# Patient Record
Sex: FEMALE | Race: BLACK OR AFRICAN AMERICAN | NOT HISPANIC OR LATINO | URBAN - METROPOLITAN AREA
[De-identification: names, ages, dates, MRNs, and addresses within clinical notes are randomized per-mention and may not be internally consistent; named-entity substitution may affect disease eponyms.]

---

## 2017-07-10 ENCOUNTER — EMERGENCY (EMERGENCY)
Facility: HOSPITAL | Age: 37
LOS: 0 days | Discharge: HOME | End: 2017-07-10
Admitting: INTERNAL MEDICINE

## 2017-07-10 DIAGNOSIS — M25.541 PAIN IN JOINTS OF RIGHT HAND: ICD-10-CM

## 2017-07-10 DIAGNOSIS — R42 DIZZINESS AND GIDDINESS: ICD-10-CM

## 2017-07-10 DIAGNOSIS — M25.562 PAIN IN LEFT KNEE: ICD-10-CM

## 2017-07-10 DIAGNOSIS — R06.02 SHORTNESS OF BREATH: ICD-10-CM

## 2017-07-10 DIAGNOSIS — M32.9 SYSTEMIC LUPUS ERYTHEMATOSUS, UNSPECIFIED: ICD-10-CM

## 2017-07-10 DIAGNOSIS — Z98.890 OTHER SPECIFIED POSTPROCEDURAL STATES: ICD-10-CM

## 2017-07-10 DIAGNOSIS — M06.9 RHEUMATOID ARTHRITIS, UNSPECIFIED: ICD-10-CM

## 2017-07-10 DIAGNOSIS — R51 HEADACHE: ICD-10-CM

## 2017-07-10 DIAGNOSIS — M25.542 PAIN IN JOINTS OF LEFT HAND: ICD-10-CM

## 2017-07-10 DIAGNOSIS — R07.9 CHEST PAIN, UNSPECIFIED: ICD-10-CM

## 2017-07-10 DIAGNOSIS — Z79.899 OTHER LONG TERM (CURRENT) DRUG THERAPY: ICD-10-CM

## 2017-07-10 DIAGNOSIS — Z79.01 LONG TERM (CURRENT) USE OF ANTICOAGULANTS: ICD-10-CM

## 2017-07-10 DIAGNOSIS — E83.42 HYPOMAGNESEMIA: ICD-10-CM

## 2017-07-10 DIAGNOSIS — R07.89 OTHER CHEST PAIN: ICD-10-CM

## 2017-07-10 DIAGNOSIS — M25.561 PAIN IN RIGHT KNEE: ICD-10-CM

## 2017-07-10 DIAGNOSIS — I26.99 OTHER PULMONARY EMBOLISM WITHOUT ACUTE COR PULMONALE: ICD-10-CM

## 2017-07-10 DIAGNOSIS — Z86.711 PERSONAL HISTORY OF PULMONARY EMBOLISM: ICD-10-CM

## 2017-07-10 DIAGNOSIS — M25.569 PAIN IN UNSPECIFIED KNEE: ICD-10-CM

## 2018-02-12 ENCOUNTER — EMERGENCY (EMERGENCY)
Facility: HOSPITAL | Age: 38
LOS: 0 days | Discharge: HOME | End: 2018-02-12
Attending: EMERGENCY MEDICINE

## 2018-02-12 VITALS
TEMPERATURE: 99 F | DIASTOLIC BLOOD PRESSURE: 57 MMHG | HEART RATE: 79 BPM | SYSTOLIC BLOOD PRESSURE: 116 MMHG | OXYGEN SATURATION: 100 % | RESPIRATION RATE: 18 BRPM

## 2018-02-12 DIAGNOSIS — F17.200 NICOTINE DEPENDENCE, UNSPECIFIED, UNCOMPLICATED: ICD-10-CM

## 2018-02-12 DIAGNOSIS — Z79.01 LONG TERM (CURRENT) USE OF ANTICOAGULANTS: ICD-10-CM

## 2018-02-12 DIAGNOSIS — L73.2 HIDRADENITIS SUPPURATIVA: ICD-10-CM

## 2018-02-12 DIAGNOSIS — Z98.890 OTHER SPECIFIED POSTPROCEDURAL STATES: Chronic | ICD-10-CM

## 2018-02-12 DIAGNOSIS — M32.9 SYSTEMIC LUPUS ERYTHEMATOSUS, UNSPECIFIED: ICD-10-CM

## 2018-02-12 DIAGNOSIS — M79.662 PAIN IN LEFT LOWER LEG: ICD-10-CM

## 2018-02-12 DIAGNOSIS — Z98.890 OTHER SPECIFIED POSTPROCEDURAL STATES: ICD-10-CM

## 2018-02-12 DIAGNOSIS — M25.552 PAIN IN LEFT HIP: ICD-10-CM

## 2018-02-12 RX ORDER — WARFARIN SODIUM 2.5 MG/1
7.5 TABLET ORAL ONCE
Qty: 0 | Refills: 0 | Status: COMPLETED | OUTPATIENT
Start: 2018-02-12 | End: 2018-02-12

## 2018-02-12 RX ORDER — HYDROXYCHLOROQUINE SULFATE 200 MG
200 TABLET ORAL ONCE
Qty: 0 | Refills: 0 | Status: COMPLETED | OUTPATIENT
Start: 2018-02-12 | End: 2018-02-12

## 2018-02-12 RX ORDER — OXYCODONE AND ACETAMINOPHEN 5; 325 MG/1; MG/1
2 TABLET ORAL ONCE
Qty: 0 | Refills: 0 | Status: DISCONTINUED | OUTPATIENT
Start: 2018-02-12 | End: 2018-02-12

## 2018-02-12 RX ORDER — OXYCODONE AND ACETAMINOPHEN 5; 325 MG/1; MG/1
1 TABLET ORAL ONCE
Qty: 0 | Refills: 0 | Status: DISCONTINUED | OUTPATIENT
Start: 2018-02-12 | End: 2018-02-12

## 2018-02-12 RX ADMIN — OXYCODONE AND ACETAMINOPHEN 2 TABLET(S): 5; 325 TABLET ORAL at 19:50

## 2018-02-12 RX ADMIN — WARFARIN SODIUM 7.5 MILLIGRAM(S): 2.5 TABLET ORAL at 22:22

## 2018-02-12 RX ADMIN — OXYCODONE AND ACETAMINOPHEN 1 TABLET(S): 5; 325 TABLET ORAL at 22:22

## 2018-02-12 RX ADMIN — Medication 200 MILLIGRAM(S): at 22:22

## 2018-02-12 RX ADMIN — OXYCODONE AND ACETAMINOPHEN 2 TABLET(S): 5; 325 TABLET ORAL at 21:57

## 2018-02-12 RX ADMIN — Medication 5 MILLIGRAM(S): at 22:22

## 2018-02-12 NOTE — ED PROVIDER NOTE - MEDICAL DECISION MAKING DETAILS
Patient with SLE here after missing meds x 1 week -- reports rx are at pharmacy but was worried about calf pain so came to ED. Will get LE duplex, give patient a dose of her normal analgesia. If unreamrkable will restart on coumadin, patient has very close PMD follow up.

## 2018-02-12 NOTE — ED ADULT NURSE NOTE - OTHER COMPLAINTS
Pt sts she has been off her warfarin and lupus meds x 1 week; c/o "aches to entire lower body, numb toes on my left foot and I have an abcess under my right arm

## 2018-02-12 NOTE — ED PROVIDER NOTE - OBJECTIVE STATEMENT
Immediate Care if no appts available. Cannot prescribe for rash/lesions without evaluation. 36 yo F with history of SLE, PE missed all meds x 7 days due to not picking them up from the pharmacy here for L hip pain typical of chronic pain but also L calf pain without swelling or redness. Sx worse with ambulation. No CP or SOB.     No recent travel or immobilization.

## 2018-02-12 NOTE — ED PROVIDER NOTE - PROGRESS NOTE DETAILS
prelim US negative, will restart on regular coumadin dose, give dose in ED, also give dose of hydrochloroquine and prednisone. DC with PMD follow up, patient will  meds from pharmacy in AM

## 2018-02-12 NOTE — ED PROVIDER NOTE - PHYSICAL EXAMINATION
VITAL SIGNS: I have reviewed nursing notes and confirm.  CONSTITUTIONAL: Well-developed; well-nourished; in no acute distress.  SKIN: Skin exam is warm and dry, malar rash to face  HEAD: Normocephalic; atraumatic.  EYES: PERRL, EOM intact; conjunctiva and sclera clear.  ENT: No nasal discharge; airway clear.  NECK: Supple; non tender.  CARD: RRR, no murmur  RESP: clear lungs, no wheezing  ABD: soft, NT, ND, normal BS  EXT: no swelling, + calf tenderness on L, no asymmetry, no palpably cord, NV intact  NEURO: Alert, oriented. Grossly unremarkable. No focal deficits.  PSYCH: Cooperative, appropriate.

## 2018-02-12 NOTE — ED PROVIDER NOTE - CARE PLAN
Principal Discharge DX:	Medication discontinued without order  Secondary Diagnosis:	Lupus (systemic lupus erythematosus)  Secondary Diagnosis:	Hydradenitis

## 2018-02-12 NOTE — ED PROVIDER NOTE - NS ED ROS FT
Constitutional: no fever, chills, no recent weight loss, change in appetite or malaise  Cardiac: No chest pain, SOB or edema.  Respiratory: No cough or respiratory distress  GI: No nausea, vomiting, diarrhea or abdominal pain.  : No dysuria, frequency, urgency or hematuria  MS: see HPI  Neuro: No headache or weakness. No LOC.  Skin: has chronic malar rash  Except as documented in the HPI, all other systems are negative.

## 2018-02-22 PROBLEM — Z00.00 ENCOUNTER FOR PREVENTIVE HEALTH EXAMINATION: Status: ACTIVE | Noted: 2018-02-22

## 2018-09-06 NOTE — ED ADULT NURSE NOTE - PAIN: BODY LOCATION
10:15am-11:30am  Group note:  Patient is engaged in the group process but is not always attentive to others.  She has much to say.  Patient talked about her many losses, wanting her family to understand what she is going through.  Sometimes she strays off topic but is making progress.   Right:/Left:/Bilateral:/hip

## 2018-11-27 ENCOUNTER — INPATIENT (INPATIENT)
Facility: HOSPITAL | Age: 38
LOS: 2 days | Discharge: HOME HEALTH | End: 2018-11-30
Attending: INTERNAL MEDICINE | Admitting: INTERNAL MEDICINE

## 2018-11-27 VITALS
RESPIRATION RATE: 18 BRPM | TEMPERATURE: 98 F | OXYGEN SATURATION: 100 % | HEART RATE: 80 BPM | DIASTOLIC BLOOD PRESSURE: 88 MMHG | SYSTOLIC BLOOD PRESSURE: 145 MMHG

## 2018-11-27 DIAGNOSIS — Z98.890 OTHER SPECIFIED POSTPROCEDURAL STATES: Chronic | ICD-10-CM

## 2018-11-27 PROBLEM — I26.99 OTHER PULMONARY EMBOLISM WITHOUT ACUTE COR PULMONALE: Chronic | Status: ACTIVE | Noted: 2018-02-12

## 2018-11-27 PROBLEM — M32.9 SYSTEMIC LUPUS ERYTHEMATOSUS, UNSPECIFIED: Chronic | Status: ACTIVE | Noted: 2018-02-12

## 2018-11-27 LAB
ALBUMIN SERPL ELPH-MCNC: 3.5 G/DL — SIGNIFICANT CHANGE UP (ref 3.5–5.2)
ALP SERPL-CCNC: 51 U/L — SIGNIFICANT CHANGE UP (ref 30–115)
ALT FLD-CCNC: 10 U/L — SIGNIFICANT CHANGE UP (ref 0–41)
ANION GAP SERPL CALC-SCNC: 14 MMOL/L — SIGNIFICANT CHANGE UP (ref 7–14)
AST SERPL-CCNC: 22 U/L — SIGNIFICANT CHANGE UP (ref 0–41)
BASE EXCESS BLDV CALC-SCNC: -2.9 MMOL/L — LOW (ref -2–2)
BASOPHILS # BLD AUTO: 0.04 K/UL — SIGNIFICANT CHANGE UP (ref 0–0.2)
BASOPHILS NFR BLD AUTO: 0.8 % — SIGNIFICANT CHANGE UP (ref 0–1)
BILIRUB SERPL-MCNC: 0.3 MG/DL — SIGNIFICANT CHANGE UP (ref 0.2–1.2)
BLD GP AB SCN SERPL QL: SIGNIFICANT CHANGE UP
BUN SERPL-MCNC: 6 MG/DL — LOW (ref 10–20)
CA-I SERPL-SCNC: 1.12 MMOL/L — SIGNIFICANT CHANGE UP (ref 1.12–1.3)
CALCIUM SERPL-MCNC: 8.7 MG/DL — SIGNIFICANT CHANGE UP (ref 8.5–10.1)
CHLORIDE SERPL-SCNC: 105 MMOL/L — SIGNIFICANT CHANGE UP (ref 98–110)
CO2 SERPL-SCNC: 19 MMOL/L — SIGNIFICANT CHANGE UP (ref 17–32)
CREAT SERPL-MCNC: 0.8 MG/DL — SIGNIFICANT CHANGE UP (ref 0.7–1.5)
CRP SERPL-MCNC: 0.51 MG/DL — HIGH (ref 0–0.4)
D DIMER BLD IA.RAPID-MCNC: 290 NG/ML DDU — HIGH (ref 0–230)
EOSINOPHIL # BLD AUTO: 0.07 K/UL — SIGNIFICANT CHANGE UP (ref 0–0.7)
EOSINOPHIL NFR BLD AUTO: 1.3 % — SIGNIFICANT CHANGE UP (ref 0–8)
ERYTHROCYTE [SEDIMENTATION RATE] IN BLOOD: 11 MM/HR — SIGNIFICANT CHANGE UP (ref 0–15)
GAS PNL BLDV: 138 MMOL/L — SIGNIFICANT CHANGE UP (ref 136–145)
GAS PNL BLDV: SIGNIFICANT CHANGE UP
GLUCOSE SERPL-MCNC: 84 MG/DL — SIGNIFICANT CHANGE UP (ref 70–99)
HCO3 BLDV-SCNC: 23 MMOL/L — SIGNIFICANT CHANGE UP (ref 22–29)
HCT VFR BLD CALC: 36.4 % — LOW (ref 37–47)
HCT VFR BLDA CALC: 36.7 % — SIGNIFICANT CHANGE UP (ref 34–44)
HGB BLD CALC-MCNC: 12 G/DL — LOW (ref 14–18)
HGB BLD-MCNC: 11.8 G/DL — LOW (ref 12–16)
IMM GRANULOCYTES NFR BLD AUTO: 0.4 % — HIGH (ref 0.1–0.3)
INR BLD: 1.15 RATIO — SIGNIFICANT CHANGE UP (ref 0.65–1.3)
LACTATE BLDV-MCNC: 1.1 MMOL/L — SIGNIFICANT CHANGE UP (ref 0.5–1.6)
LIDOCAIN IGE QN: 24 U/L — SIGNIFICANT CHANGE UP (ref 7–60)
LYMPHOCYTES # BLD AUTO: 0.99 K/UL — LOW (ref 1.2–3.4)
LYMPHOCYTES # BLD AUTO: 18.9 % — LOW (ref 20.5–51.1)
MAGNESIUM SERPL-MCNC: 1.9 MG/DL — SIGNIFICANT CHANGE UP (ref 1.8–2.4)
MCHC RBC-ENTMCNC: 29.6 PG — SIGNIFICANT CHANGE UP (ref 27–31)
MCHC RBC-ENTMCNC: 32.4 G/DL — SIGNIFICANT CHANGE UP (ref 32–37)
MCV RBC AUTO: 91.5 FL — SIGNIFICANT CHANGE UP (ref 81–99)
MONOCYTES # BLD AUTO: 0.89 K/UL — HIGH (ref 0.1–0.6)
MONOCYTES NFR BLD AUTO: 17 % — HIGH (ref 1.7–9.3)
NEUTROPHILS # BLD AUTO: 3.22 K/UL — SIGNIFICANT CHANGE UP (ref 1.4–6.5)
NEUTROPHILS NFR BLD AUTO: 61.6 % — SIGNIFICANT CHANGE UP (ref 42.2–75.2)
NRBC # BLD: 0 /100 WBCS — SIGNIFICANT CHANGE UP (ref 0–0)
PCO2 BLDV: 43 MMHG — SIGNIFICANT CHANGE UP (ref 41–51)
PH BLDV: 7.34 — SIGNIFICANT CHANGE UP (ref 7.26–7.43)
PLATELET # BLD AUTO: 252 K/UL — SIGNIFICANT CHANGE UP (ref 130–400)
PO2 BLDV: 43 MMHG — HIGH (ref 20–40)
POTASSIUM BLDV-SCNC: 3.6 MMOL/L — SIGNIFICANT CHANGE UP (ref 3.3–5.6)
POTASSIUM SERPL-MCNC: 4.5 MMOL/L — SIGNIFICANT CHANGE UP (ref 3.5–5)
POTASSIUM SERPL-SCNC: 4.5 MMOL/L — SIGNIFICANT CHANGE UP (ref 3.5–5)
PROT SERPL-MCNC: 6.3 G/DL — SIGNIFICANT CHANGE UP (ref 6–8)
PROTHROM AB SERPL-ACNC: 13.2 SEC — HIGH (ref 9.95–12.87)
RBC # BLD: 3.98 M/UL — LOW (ref 4.2–5.4)
RBC # FLD: 13.4 % — SIGNIFICANT CHANGE UP (ref 11.5–14.5)
SAO2 % BLDV: 75 % — SIGNIFICANT CHANGE UP
SODIUM SERPL-SCNC: 138 MMOL/L — SIGNIFICANT CHANGE UP (ref 135–146)
TROPONIN T SERPL-MCNC: <0.01 NG/ML — SIGNIFICANT CHANGE UP
TYPE + AB SCN PNL BLD: SIGNIFICANT CHANGE UP
WBC # BLD: 5.23 K/UL — SIGNIFICANT CHANGE UP (ref 4.8–10.8)
WBC # FLD AUTO: 5.23 K/UL — SIGNIFICANT CHANGE UP (ref 4.8–10.8)

## 2018-11-27 RX ORDER — ZOLPIDEM TARTRATE 10 MG/1
5 TABLET ORAL AT BEDTIME
Qty: 0 | Refills: 0 | Status: DISCONTINUED | OUTPATIENT
Start: 2018-11-27 | End: 2018-11-30

## 2018-11-27 RX ORDER — SENNA PLUS 8.6 MG/1
2 TABLET ORAL AT BEDTIME
Qty: 0 | Refills: 0 | Status: DISCONTINUED | OUTPATIENT
Start: 2018-11-27 | End: 2018-11-30

## 2018-11-27 RX ORDER — ALPRAZOLAM 0.25 MG
0.5 TABLET ORAL ONCE
Qty: 0 | Refills: 0 | Status: DISCONTINUED | OUTPATIENT
Start: 2018-11-27 | End: 2018-11-27

## 2018-11-27 RX ORDER — OXYCODONE HYDROCHLORIDE 5 MG/1
10 TABLET ORAL ONCE
Qty: 0 | Refills: 0 | Status: DISCONTINUED | OUTPATIENT
Start: 2018-11-27 | End: 2018-11-27

## 2018-11-27 RX ORDER — ALPRAZOLAM 0.25 MG
0.5 TABLET ORAL AT BEDTIME
Qty: 0 | Refills: 0 | Status: DISCONTINUED | OUTPATIENT
Start: 2018-11-27 | End: 2018-11-28

## 2018-11-27 RX ORDER — OXYCODONE AND ACETAMINOPHEN 5; 325 MG/1; MG/1
1 TABLET ORAL EVERY 6 HOURS
Qty: 0 | Refills: 0 | Status: DISCONTINUED | OUTPATIENT
Start: 2018-11-27 | End: 2018-11-28

## 2018-11-27 RX ORDER — SODIUM CHLORIDE 9 MG/ML
1000 INJECTION INTRAMUSCULAR; INTRAVENOUS; SUBCUTANEOUS ONCE
Qty: 0 | Refills: 0 | Status: COMPLETED | OUTPATIENT
Start: 2018-11-27 | End: 2018-11-27

## 2018-11-27 RX ORDER — HYDROXYCHLOROQUINE SULFATE 200 MG
1 TABLET ORAL
Qty: 0 | Refills: 0 | COMMUNITY

## 2018-11-27 RX ORDER — ZOLPIDEM TARTRATE 10 MG/1
5 TABLET ORAL ONCE
Qty: 0 | Refills: 0 | Status: DISCONTINUED | OUTPATIENT
Start: 2018-11-27 | End: 2018-11-28

## 2018-11-27 RX ORDER — DIPHENHYDRAMINE HCL 50 MG
25 CAPSULE ORAL ONCE
Qty: 0 | Refills: 0 | Status: COMPLETED | OUTPATIENT
Start: 2018-11-27 | End: 2018-11-27

## 2018-11-27 RX ORDER — ONDANSETRON 8 MG/1
4 TABLET, FILM COATED ORAL EVERY 6 HOURS
Qty: 0 | Refills: 0 | Status: DISCONTINUED | OUTPATIENT
Start: 2018-11-27 | End: 2018-11-28

## 2018-11-27 RX ORDER — DOCUSATE SODIUM 100 MG
100 CAPSULE ORAL THREE TIMES A DAY
Qty: 0 | Refills: 0 | Status: DISCONTINUED | OUTPATIENT
Start: 2018-11-27 | End: 2018-11-30

## 2018-11-27 RX ORDER — WARFARIN SODIUM 2.5 MG/1
7.5 TABLET ORAL ONCE
Qty: 0 | Refills: 0 | Status: COMPLETED | OUTPATIENT
Start: 2018-11-27 | End: 2018-11-27

## 2018-11-27 RX ORDER — HYDROXYCHLOROQUINE SULFATE 200 MG
200 TABLET ORAL DAILY
Qty: 0 | Refills: 0 | Status: DISCONTINUED | OUTPATIENT
Start: 2018-11-27 | End: 2018-11-30

## 2018-11-27 RX ADMIN — Medication 20 MILLIGRAM(S): at 23:39

## 2018-11-27 RX ADMIN — Medication 125 MILLIGRAM(S): at 14:30

## 2018-11-27 RX ADMIN — SODIUM CHLORIDE 1000 MILLILITER(S): 9 INJECTION INTRAMUSCULAR; INTRAVENOUS; SUBCUTANEOUS at 18:07

## 2018-11-27 RX ADMIN — OXYCODONE HYDROCHLORIDE 10 MILLIGRAM(S): 5 TABLET ORAL at 18:06

## 2018-11-27 RX ADMIN — OXYCODONE HYDROCHLORIDE 10 MILLIGRAM(S): 5 TABLET ORAL at 14:31

## 2018-11-27 RX ADMIN — OXYCODONE HYDROCHLORIDE 10 MILLIGRAM(S): 5 TABLET ORAL at 19:22

## 2018-11-27 RX ADMIN — Medication 0.5 MILLIGRAM(S): at 17:05

## 2018-11-27 RX ADMIN — SODIUM CHLORIDE 2000 MILLILITER(S): 9 INJECTION INTRAMUSCULAR; INTRAVENOUS; SUBCUTANEOUS at 14:31

## 2018-11-27 RX ADMIN — Medication 25 MILLIGRAM(S): at 19:31

## 2018-11-27 RX ADMIN — OXYCODONE HYDROCHLORIDE 10 MILLIGRAM(S): 5 TABLET ORAL at 17:52

## 2018-11-27 RX ADMIN — OXYCODONE HYDROCHLORIDE 10 MILLIGRAM(S): 5 TABLET ORAL at 23:16

## 2018-11-27 NOTE — H&P ADULT - NSHPREVIEWOFSYSTEMS_GEN_ALL_CORE
Eyes:  No visual changes, eye pain or discharge.  ENMT:  No hearing changes, pain, no sore throat or runny nose, no difficulty swallowing  Cardiac:  as mentioned in HPI.  Respiratory: uri symptoms now resolved  GI:  as mentioned in HPI.  :  No dysuria, frequency or burning.  MS:  as mentioned in HPI.  Neuro:  No headache or weakness.  No LOC.  Skin:  No skin rash.   Endocrine: No history of thyroid disease or diabetes.

## 2018-11-27 NOTE — H&P ADULT - NSHPSOCIALHISTORY_GEN_ALL_CORE
Current everyday smoker ; alcohol socially , denies drug use.  lives with  at home and needs help with ADLs.

## 2018-11-27 NOTE — ED ADULT TRIAGE NOTE - CHIEF COMPLAINT QUOTE
Pt recently diagnosed with lupus and now complaining of dizziness, heartache nausea and emesis. pt on warfarin for a PE.

## 2018-11-27 NOTE — H&P ADULT - ASSESSMENT
38 y o f with pmh of SLE , RA on prednisone , hydroxychloroquine , PE on coumadin , gastritis , fibromyalgia and insomnia presented to ER with c/o 1 wk h/o weakness , myalgias and 1 day h/o chest pain and left shoulder pain .     # CHILLS / MYALGIAS / LIKELY VIRAL ETIOLOGY DOUBT FLU AS SYMPTOMS HAVE RESOLVED / RA NOT TAKING PREDNISONE     - will admit to medicine for optimization of medical care.  - will start pt on prednisone 20 mg once a day for 7 days, pt will need a slow  steroid taper .  - Pt reports that her rheumatologist manages her RA and SLE , but recently she is not able to follow up with her sec to insurance issues.  -outpt rheumatology f/u.  -CTA negative for PE ; will continue with coumadin.  - supportive care.  - will continue with plaquanil .  -PT /OT eval.    # SLE FLARE:    - will c/w plaquanil and steroids .    #  H/O PE :    - will continue with coumadin , inr is subtherapeutic ; repeat INR in am.    # DVT / GI :    -ON COUMADIN , WILL START ON PROTONIX.    # DISPO:    -full code from home.

## 2018-11-27 NOTE — ED PROVIDER NOTE - NS ED ROS FT
Constitutional: + fevers/chills, + sick contacts  Eyes: + visual changes, eye pain or discharge. No photophobia  ENMT: No hearing changes, pain, discharge or infections. No sore throat or drooling.  Neck:  No neck pain or stiffness. No limited ROM  Cardiac: + SOB or edema. + chest pain radiating to her back  Respiratory: + cough and mild respiratory distress. No hemoptysis. No history of asthma or RAD  GI: + nausea/vomiting and abd pain  : No dysuria, frequency or burning. No discharge  MS: No myalgia, muscle weakness, joint pain or back pain  Neuro: No headache or weakness. No LOC  Skin: No skin rash  Endo: no diabetes or thyroid dysfunction  Heme: +hx DVT/PE  Except as documented in the HPI, all other systems are negative

## 2018-11-27 NOTE — ED PROVIDER NOTE - CARE PLAN
Principal Discharge DX:	SLE exacerbation  Secondary Diagnosis:	Chest pain, unspecified type  Secondary Diagnosis:	Non-intractable vomiting with nausea, unspecified vomiting type  Secondary Diagnosis:	History of medication noncompliance

## 2018-11-27 NOTE — ED PROVIDER NOTE - SECONDARY DIAGNOSIS.
Chest pain, unspecified type Non-intractable vomiting with nausea, unspecified vomiting type History of medication noncompliance

## 2018-11-27 NOTE — H&P ADULT - HISTORY OF PRESENT ILLNESS
38 y o f with pmh of SLE , RA on prednisone , hydroxychloroquine , PE on coumadin , gastritis , fibromyalgia and insomnia presented to ER with c/o 1 wk h/o weakness , myalgias and 1 day h/o chest pain and left shoulder pain . Pt reports that she was on prednisone for her RA , her doctor tapered her off steroids one month ago and since then she has been having pains. One wk ago pt started feeling chills,  had myalgias , runny nose , body aches and was vomiting. Pt reports that she took advil and it helped her with symptoms. Pt denies any sick contacts . Pt reports now that her symptoms of URI have resolved but chest pain that she had last night made her come to ER . Pt describes the pain as 10/10 , sharp , on left side and associated with SOB .Pt went to her PMD  2 days ago , and was told her INR was subtherapeutic so she should increase dose of coumadin. Pt reports that now she is taking 7.5 mg of coumadin.    In ER pt was afebrile , vitally stable, CTA was done and did not show any PE . But pt still reports weakness , so was admitted for optimization of comorbid conditions.

## 2018-11-27 NOTE — H&P ADULT - ATTENDING COMMENTS
pt seen and examined independently, I have read and agree with above exam and poa    continue dmads/steroids    check cx    rehab/pt/p    lmwh/coumadin    dc planning

## 2018-11-27 NOTE — H&P ADULT - FAMILY HISTORY
Mother  Still living? Yes, Estimated age: Age Unknown  Family history of CHF (congestive heart failure), Age at diagnosis: Age Unknown

## 2018-11-27 NOTE — ED ADULT NURSE NOTE - GENITOURINARY ASSESSMENT
Medicare Wellness Visit  Plan for Preventive Care    A good way for you to stay healthy is to use preventive care.  Medicare covers many services that can help you stay healthy.* The goal of these services is to find any health problems as quickly as possible. Finding problems early can help make them easier to treat.  Your personal plan below lists the services you may need and when they are due.     Health Maintenance Summary     Topic Due On Due Status Completed On    Immunization - Pneumococcal  Completed Oct 2, 2015    Medicare Wellness Visit Jul 21, 2017 Overdue Jul 21, 2016    IMMUNIZATION - DTaP/Tdap/Td [birth year], 1949 Overdue     Immunization-Influenza  Completed Sep 22, 2017    Depression Screening Mar 23, 2019 Not Due Mar 23, 2018           Preventive Care for Women and Men    Heart Screenings (Cardiovascular):  · Blood tests are used to check your cholesterol, lipid and triglyceride levels. High levels can increase your risk for heart disease and stroke. High levels can be treated with medications, diet and exercise. Lowering your levels can help keep your heart and blood vessels healthy.  Your provider will order these tests if they are needed.    · An ultrasound is done to see if you have an abdominal aortic aneurysm (AAA).  This is an enlargement of one of the main blood vessels that delivers blood to the body.   In the [place], 9,000 deaths are caused by AAA.  You may not even know you have this problem and as many as 1 in 3 people will have a serious problem if it is not treated.  Early diagnosis allows for more effective treatment and cure.  If you have a family history of AAA or are a male age 65-75 who has smoked, you are at higher risk of an AAA.  Your provider can order this test, if needed.    Colorectal Screening:  · There are many tests that are used to check for cancer of your colon and rectum. You and your provider should discuss what test is best for you and when to have it done.   Options include:  · Screening Colonoscopy: exam of the entire colon, seen through a flexible lighted tube.  · Flexible Sigmoidoscopy: exam of the last third (sigmoid portion) of the colon and rectum, seen through a flexible lighted tube.  · Cologuard DNA stool test: a sample of your stool is used to screen for cancer and unseen blood in your stool.  · Fecal Occult Blood Test: a sample of your stool is studied to find any unseen blood    Flu Shot:  · An immunization that helps to prevent influenza (the flu). You should get this every year. The best time to get the shot is in the fall.    Pneumococcal Shot:  • Vaccines are available that can help prevent pneumococcal disease, which is any type of infection caused by Streptococcus pneumoniae bacteria.   Their use can prevent some cases of pneumonia, meningitis, and sepsis. There are two types of pneumococcal vaccines:   o Conjugate vaccines (PCV-13 or Prevnar 13®) - helps protect against the 13 types of pneumococcal bacteria that are the most common causes of serious infections in children and adults.    o Polysaccharide vaccine (PPSV23 or Lhkkijtob34®) - helps protect against 23 types of pneumococcal bacteria for patients who are recommended to get it.  These vaccines should be given at least 12 months apart.  A booster is usually not needed.     Hepatitis B Shot:  · An immunization that helps to protect people from getting Hepatitis B. Hepatitis B is a virus that spreads through contact with infected blood or body fluids. Many people with the virus do not have symptoms.  The virus can lead to serious problems, such as liver disease. Some people are at higher risk than others. Your doctor will tell you if you need this shot.     Diabetes Screening:  · A test to measure sugar (glucose) in your blood is called a fasting blood sugar. Fasting means you cannot have food or drink for at least 8 hours before the test. This test can detect diabetes long before you may notice  symptoms.    Glaucoma Screening:  · Glaucoma screening is performed by your eye doctor. The test measures the fluid pressure inside your eyes to determine if you have glaucoma.     Hepatitis C Screening:  · A blood test to see if you have the hepatitis C virus.  Hepatitis C attacks the liver and is a major cause of chronic liver disease.  Medicare will cover a single screening for all adults born between 1945 & 1965, or high risk patients (people who have injected illegal drugs or people who have had blood transfusions).  High risk patients who continue to inject illegal drugs can be screened for Hepatitis C every year.    Smoking and Tobacco-Use Cessation Counseling:  · Tobacco is the single greatest cause of disease and early death in our country today. Medication and counseling together can increase a person’s chance of quitting for good.   · Medicare covers two quitting attempts per year, with four counseling sessions per attempt (eight sessions in a 12 month period)    Preventive Screening tests for Women    Screening Mammograms and Breast Exams:  · An x-ray of your breasts to check for breast cancer before you or your doctor may be able to feel it.  If breast cancer is found early it can usually be treated with success.    Pelvic Exams and Pap Tests:  · An exam to check for cervical and vaginal cancer. A Pap test is a lab test in which cells are taken from your cervix and sent to the lab to look for signs of cervical cancer. If cancer of the cervix is found early, chances for a cure are good. Testing can generally end at age 65, or if a woman has a hysterectomy for a benign condition. Your provider may recommend more frequent testing if certain abnormal results are found.    Bone Mass Measurements:  · A painless x-ray of your bone density to see if you are at risk for a broken bone. Bone density refers to the thickness of bones or how tightly the bone tissue is packed.    Preventive Screening tests for  Men    Prostate Screening:  · PSA - Prostate Cancer blood test.  Experts do not recommend routine screening of healthy men with no signs or symptoms of prostate disease.  However, men should not ignore urinary symptoms, and should discuss their family history with their doctor.    *Medicare pays for many preventive services to keep you healthy. For some of these services, you might have to pay a deductible, coinsurance, and / or copayment.  The amounts vary depending on the type of services you need and the kind of Medicare health plan you have.               WDL

## 2018-11-27 NOTE — H&P ADULT - NSHPPHYSICALEXAM_GEN_ALL_CORE
· BP Systolic	145 mm Hg  · BP Diastolic	88 mm Hg  · Heart Rate	80 /min  · Respiration Rate (breaths/min)	18 /min  · Temp (F)	97.7 Degrees F  · Temp (C)	36.5 Degrees C  · Temp site	oral  · SpO2 (%)	100 %        PHYSICAL EXAM:  GENERAL: NAD, well-developed, malar rash +  HEAD:  Atraumatic, Normocephalic  EYES: EOMI, PERRLA, conjunctiva and sclera clear  NECK: Supple, No JVD  CHEST/LUNG: Clear to auscultation bilaterally; No wheeze  HEART: Regular rate and rhythm; No murmurs, rubs, or gallops  ABDOMEN: Soft, Nontender, Nondistended; Bowel sounds present  EXTREMITIES:  2+ Peripheral Pulses, No clubbing, cyanosis, or edema  PSYCH: AAOx3  NEUROLOGY: non-focal  SKIN: No rashes or lesions

## 2018-11-27 NOTE — ED ADULT NURSE NOTE - NSIMPLEMENTINTERV_GEN_ALL_ED
Implemented All Universal Safety Interventions:  [place] to call system. Call bell, personal items and telephone within reach. Instruct patient to call for assistance. Room bathroom lighting operational. Non-slip footwear when patient is off stretcher. Physically safe environment: no spills, clutter or unnecessary equipment. Stretcher in lowest position, wheels locked, appropriate side rails in place.

## 2018-11-27 NOTE — H&P ADULT - PMH
Fibromyalgia    Gastritis    Lupus (systemic lupus erythematosus)    Pulmonary embolism    Rheumatoid arthritis

## 2018-11-27 NOTE — ED PROVIDER NOTE - PHYSICAL EXAMINATION
CONSTITUTIONAL: well developed; well nourished; well appearing in mild distress  HEAD: normocephalic; atraumatic  EYES: no conjunctival injection, no scleral icterus  ENT: no nasal discharge; airway clear.  NECK: supple; non tender. + full passive ROM in all directions  CARD: S1, S2 normal; no murmurs, gallops, or rubs. Regular rate and rhythm  RESP: no wheezes, rales or rhonchi. Good air movement bilaterally without significant accessory muscle use  ABD: soft; non-distended; non-tender. No rebound, no guarding, no pulsatile abdominal mass  EXT: moving all extremities spontaneously, normal ROM. mild b/l peripheral edema  SKIN: warm and dry, no lesions noted  NEURO: alert, oriented, CN II-XII grossly intact, motor and sensory grossly intact, speech nonslurred, no focal deficits. GCS 15  PSYCH: crying, cooperative, appropriate, good eye contact, logical thought process, no apparent danger to self or others

## 2018-11-27 NOTE — ED PROVIDER NOTE - OBJECTIVE STATEMENT
This is a 38yF lupus on long term steroids hx PE on warfarin who presents for CP and SOB x days. Pt saw new PCP last week, who took her off her steroids (prednisone 10mg PO daily) to switch her to new lupus medication.  Since then, she reports diffuse pain, CP and SOB.  Chest pain is midsternal radiating to her L side and back, associated w/ SOB.  Pt reports not taking her warfarin for days for unclear reasons.  Also reports low grade fever yesterday, ongoing mild cough and URI/congestion and friend with URI sx.  Did not get flu shot this year.

## 2018-11-27 NOTE — H&P ADULT - NSHPLABSRESULTS_GEN_ALL_CORE
11.8   5.23  )-----------( 252      ( 27 Nov 2018 13:46 )             36.4       11-27    138  |  105  |  6<L>  ----------------------------<  84  4.5   |  19  |  0.8    Ca    8.7      27 Nov 2018 13:46  Mg     1.9     11-27    TPro  6.3  /  Alb  3.5  /  TBili  0.3  /  DBili  x   /  AST  22  /  ALT  10  /  AlkPhos  51  11-27                  PT/INR - ( 27 Nov 2018 13:46 )   PT: 13.20 sec;   INR: 1.15 ratio             Lactate Trend      CARDIAC MARKERS ( 27 Nov 2018 13:46 )  x     / <0.01 ng/mL / x     / x     / x            CAPILLARY BLOOD GLUCOSE

## 2018-11-27 NOTE — ED PROVIDER NOTE - MEDICAL DECISION MAKING DETAILS
38yF hx lupus and gastritis presents with severe diffuse pain, pamella CP, SOB, and persistent vomiting after stopping her prednisone and warfarin.  Labs reassuring except for elevated d-dimer. CT PE neg.  Pt given solumedrol, will admit for severe pain and to address appropriate treatment of her lupus, given missed medications, loss to specialist follow up and sx unusual/out of proportion to her usual flares.

## 2018-11-28 ENCOUNTER — TRANSCRIPTION ENCOUNTER (OUTPATIENT)
Age: 38
End: 2018-11-28

## 2018-11-28 LAB
ANION GAP SERPL CALC-SCNC: 14 MMOL/L — SIGNIFICANT CHANGE UP (ref 7–14)
BASOPHILS # BLD AUTO: 0.02 K/UL — SIGNIFICANT CHANGE UP (ref 0–0.2)
BASOPHILS NFR BLD AUTO: 0.4 % — SIGNIFICANT CHANGE UP (ref 0–1)
BUN SERPL-MCNC: 8 MG/DL — LOW (ref 10–20)
CALCIUM SERPL-MCNC: 8.5 MG/DL — SIGNIFICANT CHANGE UP (ref 8.5–10.1)
CHLORIDE SERPL-SCNC: 103 MMOL/L — SIGNIFICANT CHANGE UP (ref 98–110)
CO2 SERPL-SCNC: 20 MMOL/L — SIGNIFICANT CHANGE UP (ref 17–32)
CREAT SERPL-MCNC: 0.6 MG/DL — LOW (ref 0.7–1.5)
EOSINOPHIL # BLD AUTO: 0 K/UL — SIGNIFICANT CHANGE UP (ref 0–0.7)
EOSINOPHIL NFR BLD AUTO: 0 % — SIGNIFICANT CHANGE UP (ref 0–8)
ERYTHROCYTE [SEDIMENTATION RATE] IN BLOOD: 11 MM/HR — SIGNIFICANT CHANGE UP (ref 0–15)
GLUCOSE SERPL-MCNC: 114 MG/DL — HIGH (ref 70–99)
HCT VFR BLD CALC: 36.8 % — LOW (ref 37–47)
HGB BLD-MCNC: 11.9 G/DL — LOW (ref 12–16)
IMM GRANULOCYTES NFR BLD AUTO: 0.4 % — HIGH (ref 0.1–0.3)
INR BLD: 1.18 RATIO — SIGNIFICANT CHANGE UP (ref 0.65–1.3)
LYMPHOCYTES # BLD AUTO: 0.56 K/UL — LOW (ref 1.2–3.4)
LYMPHOCYTES # BLD AUTO: 10.3 % — LOW (ref 20.5–51.1)
MCHC RBC-ENTMCNC: 29.3 PG — SIGNIFICANT CHANGE UP (ref 27–31)
MCHC RBC-ENTMCNC: 32.3 G/DL — SIGNIFICANT CHANGE UP (ref 32–37)
MCV RBC AUTO: 90.6 FL — SIGNIFICANT CHANGE UP (ref 81–99)
MONOCYTES # BLD AUTO: 0.3 K/UL — SIGNIFICANT CHANGE UP (ref 0.1–0.6)
MONOCYTES NFR BLD AUTO: 5.5 % — SIGNIFICANT CHANGE UP (ref 1.7–9.3)
NEUTROPHILS # BLD AUTO: 4.54 K/UL — SIGNIFICANT CHANGE UP (ref 1.4–6.5)
NEUTROPHILS NFR BLD AUTO: 83.4 % — HIGH (ref 42.2–75.2)
PLATELET # BLD AUTO: 271 K/UL — SIGNIFICANT CHANGE UP (ref 130–400)
POTASSIUM SERPL-MCNC: 4.4 MMOL/L — SIGNIFICANT CHANGE UP (ref 3.5–5)
POTASSIUM SERPL-SCNC: 4.4 MMOL/L — SIGNIFICANT CHANGE UP (ref 3.5–5)
PROTHROM AB SERPL-ACNC: 13.5 SEC — HIGH (ref 9.95–12.87)
RBC # BLD: 4.06 M/UL — LOW (ref 4.2–5.4)
RBC # FLD: 13.2 % — SIGNIFICANT CHANGE UP (ref 11.5–14.5)
SODIUM SERPL-SCNC: 137 MMOL/L — SIGNIFICANT CHANGE UP (ref 135–146)
WBC # BLD: 5.44 K/UL — SIGNIFICANT CHANGE UP (ref 4.8–10.8)
WBC # FLD AUTO: 5.44 K/UL — SIGNIFICANT CHANGE UP (ref 4.8–10.8)

## 2018-11-28 RX ORDER — ALPRAZOLAM 0.25 MG
0.5 TABLET ORAL AT BEDTIME
Qty: 0 | Refills: 0 | Status: DISCONTINUED | OUTPATIENT
Start: 2018-11-28 | End: 2018-11-28

## 2018-11-28 RX ORDER — RIVAROXABAN 15 MG-20MG
1 KIT ORAL
Qty: 30 | Refills: 0 | OUTPATIENT
Start: 2018-11-28 | End: 2018-12-27

## 2018-11-28 RX ORDER — ONDANSETRON 8 MG/1
4 TABLET, FILM COATED ORAL ONCE
Qty: 0 | Refills: 0 | Status: COMPLETED | OUTPATIENT
Start: 2018-11-28 | End: 2018-11-28

## 2018-11-28 RX ORDER — ALPRAZOLAM 0.25 MG
0.5 TABLET ORAL ONCE
Qty: 0 | Refills: 0 | Status: DISCONTINUED | OUTPATIENT
Start: 2018-11-28 | End: 2018-11-28

## 2018-11-28 RX ORDER — ALPRAZOLAM 0.25 MG
0.5 TABLET ORAL DAILY
Qty: 0 | Refills: 0 | Status: DISCONTINUED | OUTPATIENT
Start: 2018-11-28 | End: 2018-11-28

## 2018-11-28 RX ORDER — WARFARIN SODIUM 2.5 MG/1
7.5 TABLET ORAL ONCE
Qty: 0 | Refills: 0 | Status: DISCONTINUED | OUTPATIENT
Start: 2018-11-28 | End: 2018-11-28

## 2018-11-28 RX ORDER — RIVAROXABAN 15 MG-20MG
10 KIT ORAL EVERY 24 HOURS
Qty: 0 | Refills: 0 | Status: DISCONTINUED | OUTPATIENT
Start: 2018-11-28 | End: 2018-11-29

## 2018-11-28 RX ORDER — ALPRAZOLAM 0.25 MG
0.5 TABLET ORAL EVERY 6 HOURS
Qty: 0 | Refills: 0 | Status: DISCONTINUED | OUTPATIENT
Start: 2018-11-28 | End: 2018-11-30

## 2018-11-28 RX ORDER — RIVAROXABAN 15 MG-20MG
10 KIT ORAL EVERY 24 HOURS
Qty: 0 | Refills: 0 | Status: DISCONTINUED | OUTPATIENT
Start: 2018-11-28 | End: 2018-11-28

## 2018-11-28 RX ORDER — OXYCODONE AND ACETAMINOPHEN 5; 325 MG/1; MG/1
2 TABLET ORAL EVERY 4 HOURS
Qty: 0 | Refills: 0 | Status: DISCONTINUED | OUTPATIENT
Start: 2018-11-28 | End: 2018-11-30

## 2018-11-28 RX ADMIN — Medication 0.5 MILLIGRAM(S): at 08:56

## 2018-11-28 RX ADMIN — OXYCODONE AND ACETAMINOPHEN 1 TABLET(S): 5; 325 TABLET ORAL at 10:58

## 2018-11-28 RX ADMIN — ZOLPIDEM TARTRATE 5 MILLIGRAM(S): 10 TABLET ORAL at 22:19

## 2018-11-28 RX ADMIN — OXYCODONE AND ACETAMINOPHEN 1 TABLET(S): 5; 325 TABLET ORAL at 12:00

## 2018-11-28 RX ADMIN — OXYCODONE AND ACETAMINOPHEN 2 TABLET(S): 5; 325 TABLET ORAL at 22:18

## 2018-11-28 RX ADMIN — RIVAROXABAN 10 MILLIGRAM(S): KIT at 17:48

## 2018-11-28 RX ADMIN — WARFARIN SODIUM 7.5 MILLIGRAM(S): 2.5 TABLET ORAL at 02:27

## 2018-11-28 RX ADMIN — OXYCODONE AND ACETAMINOPHEN 1 TABLET(S): 5; 325 TABLET ORAL at 03:52

## 2018-11-28 RX ADMIN — Medication 200 MILLIGRAM(S): at 13:58

## 2018-11-28 RX ADMIN — OXYCODONE HYDROCHLORIDE 10 MILLIGRAM(S): 5 TABLET ORAL at 00:00

## 2018-11-28 RX ADMIN — OXYCODONE AND ACETAMINOPHEN 2 TABLET(S): 5; 325 TABLET ORAL at 16:38

## 2018-11-28 RX ADMIN — ZOLPIDEM TARTRATE 5 MILLIGRAM(S): 10 TABLET ORAL at 02:28

## 2018-11-28 RX ADMIN — OXYCODONE AND ACETAMINOPHEN 1 TABLET(S): 5; 325 TABLET ORAL at 04:25

## 2018-11-28 RX ADMIN — OXYCODONE AND ACETAMINOPHEN 2 TABLET(S): 5; 325 TABLET ORAL at 18:00

## 2018-11-28 RX ADMIN — ONDANSETRON 4 MILLIGRAM(S): 8 TABLET, FILM COATED ORAL at 19:04

## 2018-11-28 RX ADMIN — Medication 100 MILLIGRAM(S): at 13:58

## 2018-11-28 NOTE — DISCHARGE NOTE ADULT - MEDICATION SUMMARY - MEDICATIONS TO TAKE
I will START or STAY ON the medications listed below when I get home from the hospital:    predniSONE 5 mg oral delayed release tablet  -- 3 tab(s) once a day x 1 days, 2 tab(s) once a day x 1 days, 1 tab(s) once a day x 1 days  -- Do not chew, break, or crush.  It is very important that you take or use this exactly as directed.  Do not skip doses or discontinue unless directed by your doctor.  Obtain medical advice before taking any non-prescription drugs as some may affect the action of this medication.  Swallow whole.  Do not crush.  Take with food.    -- Indication: For SLE      Coumadin 10 mg oral tablet  -- 1 tab(s) by mouth once a day (at bedtime)   -- Do not take this drug if you are pregnant.  It is very important that you take or use this exactly as directed.  Do not skip doses or discontinue unless directed by your doctor.  Obtain medical advice before taking any non-prescription drugs as some may affect the action of this medication.    -- Indication: For blood thinner    hydroxychloroquine 200 mg oral tablet  -- 1 tab(s) by mouth once a day  -- Indication: For SLE      Xanax 0.5 mg oral tablet  -- 1 tab(s) by mouth every 6 hours, As Needed -anxiety - for anxiety MDD:4   -- Indication: For anxiety    Ambien 5 mg oral tablet  -- 1 tab(s) by mouth once a day (at bedtime), As needed, Insomnia MDD:2  -- Indication: For insomnia I will START or STAY ON the medications listed below when I get home from the hospital:    predniSONE 5 mg oral delayed release tablet  -- 3 tab(s) once a day x 1 days, 2 tab(s) once a day x 1 days, 1 tab(s) once a day x 1 days  -- Do not chew, break, or crush.  It is very important that you take or use this exactly as directed.  Do not skip doses or discontinue unless directed by your doctor.  Obtain medical advice before taking any non-prescription drugs as some may affect the action of this medication.  Swallow whole.  Do not crush.  Take with food.    -- Indication: For SLE      Percocet 10/325 oral tablet  -- 1 tab(s) by mouth every 6 hours, As Needed for severe pain MDD:4   -- Caution federal law prohibits the transfer of this drug to any person other  than the person for whom it was prescribed.  May cause drowsiness.  Alcohol may intensify this effect.  Use care when operating dangerous machinery.  This prescription cannot be refilled.  This product contains acetaminophen.  Do not use  with any other product containing acetaminophen to prevent possible liver damage.  Using more of this medication than prescribed may cause serious breathing problems.    -- Indication: For pain    Coumadin 10 mg oral tablet  -- 1 tab(s) by mouth once a day (at bedtime)   -- Do not take this drug if you are pregnant.  It is very important that you take or use this exactly as directed.  Do not skip doses or discontinue unless directed by your doctor.  Obtain medical advice before taking any non-prescription drugs as some may affect the action of this medication.    -- Indication: For blood thinner    hydroxychloroquine 200 mg oral tablet  -- 1 tab(s) by mouth once a day  -- Indication: For SLE      Xanax 0.5 mg oral tablet  -- 1 tab(s) by mouth every 6 hours, As Needed -anxiety - for anxiety MDD:4   -- Indication: For anxiety    Ambien 5 mg oral tablet  -- 1 tab(s) by mouth once a day (at bedtime), As needed, Insomnia MDD:2  -- Indication: For insomnia

## 2018-11-28 NOTE — DISCHARGE NOTE ADULT - HOSPITAL COURSE
38 y o f with pmh of SLE , RA on prednisone , hydroxychloroquine , PE on coumadin , gastritis , fibromyalgia and insomnia presented to ER with c/o 1 wk h/o weakness , myalgias and 1 day h/o chest pain and left shoulder pain . Pt reports that she was on prednisone for her RA , her doctor tapered her off steroids one month ago and since then she has been having pains. One wk ago pt started feeling chills,  had myalgias , runny nose , body aches and was vomiting. Pt reports that she took advil and it helped her with symptoms. Pt denies any sick contacts . Pt reports now that her symptoms of URI have resolved but chest pain that she had last night made her come to ER . Pt describes the pain as 10/10 , sharp , on left side and associated with SOB .Pt went to her PMD  2 days ago , and was told her INR was subtherapeutic so she should increase dose of coumadin. Pt reports that now she is taking 7.5 mg of coumadin. INR on admission was 1.15. CTA performed did not reveal PE. CXR wnl. Cardiac enzymes normal. EKG normal. Inflammatory markers were drawn to check for SLE/RA flare, so far ESR, CRP and RF wnl, rest of labs pending. Her pain medications were increased and she was started on steroid taper. Pain improved over course of hospital stay. She was given xarelto to switch to instead of coumadin because INR was subtherapeutic, however she developed nausea/vomiting, dizziness half hour after getting it so we will be discharging her on lovenox for and coumadin and she will follow up with Dr. Chamberlain in 48-72 hours to check INR and adjust coumadin accordingly. As per Dr. Champagne she can be discharged with subtherapeutic INR as long as she is on lovenox to coumadin bridge and follow up with Dr. Chamberlain in 48-72 hours. Follow up with rheumatology outpatient as well. 38 y o f with pmh of SLE , RA on prednisone , hydroxychloroquine , PE on coumadin , gastritis , fibromyalgia and insomnia presented to ER with c/o 1 wk h/o weakness , myalgias and 1 day h/o chest pain and left shoulder pain . Pt reports that she was on prednisone for her RA , her doctor tapered her off steroids one month ago and since then she has been having pains. One wk ago pt started feeling chills,  had myalgias , runny nose , body aches and was vomiting. Pt reports that she took advil and it helped her with symptoms. Pt denies any sick contacts . Pt reports now that her symptoms of URI have resolved but chest pain that she had last night made her come to ER . Pt describes the pain as 10/10 , sharp , on left side and associated with SOB .Pt went to her PMD  2 days ago , and was told her INR was subtherapeutic so she should increase dose of coumadin. Pt reports that now she is taking 7.5 mg of coumadin. INR on admission was 1.15. CTA performed did not reveal PE. CXR wnl. Cardiac enzymes normal. EKG normal. Inflammatory markers were drawn to check for SLE/RA flare, so far ESR, CRP and RF wnl, rest of labs pending. Her pain medications were increased and she was started on steroid taper. Pain improved over course of hospital stay. She was given xarelto to switch to instead of coumadin because INR was subtherapeutic, however she developed nausea/vomiting, dizziness half hour after getting it. She will be discharged with coumadin and she will follow up with Dr. Chamberlain in next week to check INR and adjust coumadin accordingly. Follow up with rheumatology outpatient as well.

## 2018-11-28 NOTE — DISCHARGE NOTE ADULT - PATIENT PORTAL LINK FT
You can access the [facility] Patient Portal, offered by [facility], by registering with the following website: [email or web address]

## 2018-11-28 NOTE — DISCHARGE NOTE ADULT - CARE PLAN
Principal Discharge DX:	Chest pain, unspecified type  Goal:	Prevent recurrence  Assessment and plan of treatment:	We ruled out pulmonary embolism when you came in. There was no cardiac or pulmonary cause found for symptoms. We increased the dose of your pain medication. Please follow up with your primary doctor and rheumatologist after discharge.  Secondary Diagnosis:	Subtherapeutic anticoagulation  Goal:	take anticoagulants, monitor blood levels  Assessment and plan of treatment:	We are discharging you with lovenox for a few days and coumadin, please take as directed. Please follow up with Dr. Chamberlain in 48-72 hours to check INR and adjust dose of coumadin accordingly. Principal Discharge DX:	Chest pain, unspecified type  Goal:	Prevent recurrence  Assessment and plan of treatment:	We ruled out pulmonary embolism when you came in. There was no cardiac or pulmonary cause found for symptoms. We increased the dose of your pain medication. Please follow up with your primary doctor and rheumatologist after discharge.  Secondary Diagnosis:	Subtherapeutic anticoagulation  Goal:	take anticoagulants, monitor blood levels  Assessment and plan of treatment:	We are discharging you with coumadin, please take as directed. Please follow up with Dr. Chamberlain next week to check INR and adjust dose of coumadin accordingly.

## 2018-11-28 NOTE — DISCHARGE NOTE ADULT - PLAN OF CARE
Prevent recurrence We ruled out pulmonary embolism when you came in. There was no cardiac or pulmonary cause found for symptoms. We increased the dose of your pain medication. Please follow up with your primary doctor and rheumatologist after discharge. take anticoagulants, monitor blood levels We are discharging you with lovenox for a few days and coumadin, please take as directed. Please follow up with Dr. Chamberlain in 48-72 hours to check INR and adjust dose of coumadin accordingly. We are discharging you with coumadin, please take as directed. Please follow up with Dr. Chamberlain next week to check INR and adjust dose of coumadin accordingly.

## 2018-11-28 NOTE — PHYSICAL THERAPY INITIAL EVALUATION ADULT - GENERAL OBSERVATIONS, REHAB EVAL
10:00-10:40. Pt encountered semifowler in bed sleepinig in NAD, spouse at b/s. Pt awoken and agreeable to PT.

## 2018-11-28 NOTE — DISCHARGE NOTE ADULT - CARE PROVIDERS DIRECT ADDRESSES
,[email or web address]

## 2018-11-28 NOTE — DISCHARGE NOTE ADULT - ADDITIONAL INSTRUCTIONS
Please follow up with Dr. Chamberlain's office in 48-72 hours to check INR and adjust coumadin dose.  Please make appointment with rheumatologist after discharge. Please follow up with Dr. Chamberlain's office next week to check INR and adjust coumadin dose.  Please make appointment with rheumatologist after discharge.

## 2018-11-28 NOTE — DISCHARGE NOTE ADULT - MEDICATION SUMMARY - MEDICATIONS TO CHANGE
I will SWITCH the dose or number of times a day I take the medications listed below when I get home from the hospital:    Coumadin 7.5 mg oral tablet  -- 1 tab(s) by mouth once a day    Percocet 5/325 oral tablet  -- 1 tab(s) by mouth every 6 hours, As Needed    Xanax 0.5 mg oral tablet  -- 1 tab(s) by mouth once a day (at bedtime)

## 2018-11-28 NOTE — PHYSICAL THERAPY INITIAL EVALUATION ADULT - CRITERIA FOR SKILLED THERAPEUTIC INTERVENTIONS
rehab potential/therapy frequency/predicted duration of therapy intervention/impairments found/functional limitations in following categories/risk reduction/prevention

## 2018-11-29 LAB
ANION GAP SERPL CALC-SCNC: 14 MMOL/L — SIGNIFICANT CHANGE UP (ref 7–14)
APTT BLD: 30.9 SEC — SIGNIFICANT CHANGE UP (ref 27–39.2)
APTT BLD: 34.3 SEC — SIGNIFICANT CHANGE UP (ref 27–39.2)
BASOPHILS # BLD AUTO: 0.04 K/UL — SIGNIFICANT CHANGE UP (ref 0–0.2)
BASOPHILS NFR BLD AUTO: 0.9 % — SIGNIFICANT CHANGE UP (ref 0–1)
BUN SERPL-MCNC: 10 MG/DL — SIGNIFICANT CHANGE UP (ref 10–20)
C3 SERPL-MCNC: 103 MG/DL — SIGNIFICANT CHANGE UP (ref 81–157)
C4 SERPL-MCNC: 26 MG/DL — SIGNIFICANT CHANGE UP (ref 13–39)
CALCIUM SERPL-MCNC: 8.4 MG/DL — LOW (ref 8.5–10.1)
CHLORIDE SERPL-SCNC: 101 MMOL/L — SIGNIFICANT CHANGE UP (ref 98–110)
CO2 SERPL-SCNC: 23 MMOL/L — SIGNIFICANT CHANGE UP (ref 17–32)
CREAT SERPL-MCNC: 0.7 MG/DL — SIGNIFICANT CHANGE UP (ref 0.7–1.5)
CRP SERPL-MCNC: 0.22 MG/DL — SIGNIFICANT CHANGE UP (ref 0–0.4)
EOSINOPHIL # BLD AUTO: 0.04 K/UL — SIGNIFICANT CHANGE UP (ref 0–0.7)
EOSINOPHIL NFR BLD AUTO: 0.9 % — SIGNIFICANT CHANGE UP (ref 0–8)
GLUCOSE SERPL-MCNC: 84 MG/DL — SIGNIFICANT CHANGE UP (ref 70–99)
HCT VFR BLD CALC: 34.4 % — LOW (ref 37–47)
HGB BLD-MCNC: 11.2 G/DL — LOW (ref 12–16)
IMM GRANULOCYTES NFR BLD AUTO: 0.2 % — SIGNIFICANT CHANGE UP (ref 0.1–0.3)
INR BLD: 1.31 RATIO — HIGH (ref 0.65–1.3)
INR BLD: 1.37 RATIO — HIGH (ref 0.65–1.3)
LYMPHOCYTES # BLD AUTO: 1.14 K/UL — LOW (ref 1.2–3.4)
LYMPHOCYTES # BLD AUTO: 24.6 % — SIGNIFICANT CHANGE UP (ref 20.5–51.1)
MAGNESIUM SERPL-MCNC: 1.7 MG/DL — LOW (ref 1.8–2.4)
MCHC RBC-ENTMCNC: 29.6 PG — SIGNIFICANT CHANGE UP (ref 27–31)
MCHC RBC-ENTMCNC: 32.6 G/DL — SIGNIFICANT CHANGE UP (ref 32–37)
MCV RBC AUTO: 90.8 FL — SIGNIFICANT CHANGE UP (ref 81–99)
MONOCYTES # BLD AUTO: 0.35 K/UL — SIGNIFICANT CHANGE UP (ref 0.1–0.6)
MONOCYTES NFR BLD AUTO: 7.6 % — SIGNIFICANT CHANGE UP (ref 1.7–9.3)
NEUTROPHILS # BLD AUTO: 3.05 K/UL — SIGNIFICANT CHANGE UP (ref 1.4–6.5)
NEUTROPHILS NFR BLD AUTO: 65.8 % — SIGNIFICANT CHANGE UP (ref 42.2–75.2)
NRBC # BLD: 0 /100 WBCS — SIGNIFICANT CHANGE UP (ref 0–0)
PLATELET # BLD AUTO: 258 K/UL — SIGNIFICANT CHANGE UP (ref 130–400)
POTASSIUM SERPL-MCNC: 4.2 MMOL/L — SIGNIFICANT CHANGE UP (ref 3.5–5)
POTASSIUM SERPL-SCNC: 4.2 MMOL/L — SIGNIFICANT CHANGE UP (ref 3.5–5)
PROTHROM AB SERPL-ACNC: 15 SEC — HIGH (ref 9.95–12.87)
PROTHROM AB SERPL-ACNC: 15.7 SEC — HIGH (ref 9.95–12.87)
RBC # BLD: 3.79 M/UL — LOW (ref 4.2–5.4)
RBC # FLD: 13.5 % — SIGNIFICANT CHANGE UP (ref 11.5–14.5)
RHEUMATOID FACT SERPL-ACNC: <10 IU/ML — SIGNIFICANT CHANGE UP (ref 0–13)
SODIUM SERPL-SCNC: 138 MMOL/L — SIGNIFICANT CHANGE UP (ref 135–146)
WBC # BLD: 4.63 K/UL — LOW (ref 4.8–10.8)
WBC # FLD AUTO: 4.63 K/UL — LOW (ref 4.8–10.8)

## 2018-11-29 RX ORDER — HEPARIN SODIUM 5000 [USP'U]/ML
1800 INJECTION INTRAVENOUS; SUBCUTANEOUS
Qty: 25000 | Refills: 0 | Status: DISCONTINUED | OUTPATIENT
Start: 2018-11-29 | End: 2018-11-29

## 2018-11-29 RX ORDER — WARFARIN SODIUM 2.5 MG/1
1 TABLET ORAL
Qty: 5 | Refills: 0 | OUTPATIENT
Start: 2018-11-29 | End: 2018-12-03

## 2018-11-29 RX ORDER — HEPARIN SODIUM 5000 [USP'U]/ML
8000 INJECTION INTRAVENOUS; SUBCUTANEOUS ONCE
Qty: 0 | Refills: 0 | Status: DISCONTINUED | OUTPATIENT
Start: 2018-11-29 | End: 2018-11-29

## 2018-11-29 RX ORDER — WARFARIN SODIUM 2.5 MG/1
10 TABLET ORAL ONCE
Qty: 0 | Refills: 0 | Status: COMPLETED | OUTPATIENT
Start: 2018-11-29 | End: 2018-11-29

## 2018-11-29 RX ORDER — WARFARIN SODIUM 2.5 MG/1
1 TABLET ORAL
Qty: 0 | Refills: 0 | COMMUNITY

## 2018-11-29 RX ORDER — HEPARIN SODIUM 5000 [USP'U]/ML
1800 INJECTION INTRAVENOUS; SUBCUTANEOUS
Qty: 25000 | Refills: 0 | Status: DISCONTINUED | OUTPATIENT
Start: 2018-11-29 | End: 2018-11-30

## 2018-11-29 RX ORDER — WARFARIN SODIUM 2.5 MG/1
1 TABLET ORAL
Qty: 10 | Refills: 0 | OUTPATIENT
Start: 2018-11-29 | End: 2018-12-08

## 2018-11-29 RX ADMIN — OXYCODONE AND ACETAMINOPHEN 2 TABLET(S): 5; 325 TABLET ORAL at 14:52

## 2018-11-29 RX ADMIN — OXYCODONE AND ACETAMINOPHEN 2 TABLET(S): 5; 325 TABLET ORAL at 23:49

## 2018-11-29 RX ADMIN — Medication 0.5 MILLIGRAM(S): at 19:00

## 2018-11-29 RX ADMIN — OXYCODONE AND ACETAMINOPHEN 2 TABLET(S): 5; 325 TABLET ORAL at 19:00

## 2018-11-29 RX ADMIN — WARFARIN SODIUM 10 MILLIGRAM(S): 2.5 TABLET ORAL at 11:36

## 2018-11-29 RX ADMIN — OXYCODONE AND ACETAMINOPHEN 2 TABLET(S): 5; 325 TABLET ORAL at 09:20

## 2018-11-29 RX ADMIN — OXYCODONE AND ACETAMINOPHEN 2 TABLET(S): 5; 325 TABLET ORAL at 04:15

## 2018-11-29 RX ADMIN — Medication 100 MILLIGRAM(S): at 12:40

## 2018-11-29 RX ADMIN — Medication 200 MILLIGRAM(S): at 11:36

## 2018-11-29 RX ADMIN — Medication 20 MILLIGRAM(S): at 06:18

## 2018-11-29 RX ADMIN — HEPARIN SODIUM 18 UNIT(S)/HR: 5000 INJECTION INTRAVENOUS; SUBCUTANEOUS at 18:42

## 2018-11-29 RX ADMIN — OXYCODONE AND ACETAMINOPHEN 2 TABLET(S): 5; 325 TABLET ORAL at 14:41

## 2018-11-29 RX ADMIN — ZOLPIDEM TARTRATE 5 MILLIGRAM(S): 10 TABLET ORAL at 23:50

## 2018-11-29 RX ADMIN — Medication 100 MILLIGRAM(S): at 06:19

## 2018-11-29 NOTE — PROGRESS NOTE ADULT - ASSESSMENT
38 y o f with pmh of SLE , RA on prednisone , hydroxychloroquine , PE on coumadin , gastritis , fibromyalgia and insomnia presented to ER with c/o 1 wk h/o weakness , myalgias and 1 day h/o chest pain and left shoulder pain .     # CHILLS / MYALGIAS / LIKELY VIRAL ETIOLOGY DOUBT FLU AS SYMPTOMS HAVE RESOLVED / RA NOT TAKING PREDNISONE   - prednisone taper starting at 20 daily  - rheumatology consulted  - CTA negative for PE, CXR wnl  - supportive care.  - will continue with plaquenil .  - PT /OT eval.    # SLE FLARE:    - will c/w plaquenil and steroids .    #  H/O PE :  IV unfractionated heparin and coumadin    - # DISPO:    -full code from home.

## 2018-11-29 NOTE — PROGRESS NOTE ADULT - ASSESSMENT
SUBJECTIVE:    Patient is a 38y old  Female who presents with a chief complaint of Chest pain , weakness (29 Nov 2018 11:57)    Currently admitted to medicine with the primary diagnosis of Chest pain, unspecified type     Today is hospital day 2d. This morning she is resting comfortably in bed and reports no new issues or overnight events. Pain better controlled. Plan was to discharge on xarelto however she had reaction to it last night which cause vomiting, dizziness. Patient is refusing LMWH. Starting unfractionated heparin until INR therapeutic.    PAST MEDICAL & SURGICAL HISTORY  PAST MEDICAL & SURGICAL HISTORY:  Rheumatoid arthritis  Gastritis  Fibromyalgia  Pulmonary embolism  Lupus (systemic lupus erythematosus)  H/O abdominal surgery    SOCIAL HISTORY:    ALLERGIES:  &quot;cold plasma&quot; (Hives)  acetaminophen (Hives)  IV Contrast (Hives)  Tomatoes (Hives)    MEDICATIONS:  STANDING MEDICATIONS  docusate sodium 100 milliGRAM(s) Oral three times a day  heparin  Infusion 1800 Unit(s)/Hr IV Continuous <Continuous>  heparin  Injectable 8000 Unit(s) IV Push once  hydroxychloroquine 200 milliGRAM(s) Oral daily  predniSONE   Tablet 20 milliGRAM(s) Oral daily    PRN MEDICATIONS  ALPRAZolam 0.5 milliGRAM(s) Oral every 6 hours PRN  oxyCODONE    5 mG/acetaminophen 325 mG 2 Tablet(s) Oral every 4 hours PRN  senna 2 Tablet(s) Oral at bedtime PRN  zolpidem 5 milliGRAM(s) Oral at bedtime PRN    VITALS:   T(F): 97  HR: 62  BP: 112/66  RR: 16  SpO2: 98%    LABS:                        11.2   4.63  )-----------( 258      ( 29 Nov 2018 08:44 )             34.4     11-29    138  |  101  |  10  ----------------------------<  84  4.2   |  23  |  0.7    Ca    8.4<L>      29 Nov 2018 08:44  Mg     1.7     11-29    TPro  6.3  /  Alb  3.5  /  TBili  0.3  /  DBili  x   /  AST  22  /  ALT  10  /  AlkPhos  51  11-27    PT/INR - ( 29 Nov 2018 08:44 )   PT: 15.70 sec;   INR: 1.37 ratio         PTT - ( 29 Nov 2018 12:01 )  PTT:34.3 sec          CARDIAC MARKERS ( 27 Nov 2018 13:46 )  x     / <0.01 ng/mL / x     / x     / x          RADIOLOGY:    PHYSICAL EXAM:  GEN: No acute distress  HEENT: NCAT  LUNGS: Clear to auscultation bilaterally   HEART: S1/S2 present. RRR.   ABD: Soft, non-tender, non-distended  EXT: no c/c/e  NEURO: AAOX3    Assessment and Plan:  38 y o f with pmh of SLE , RA on prednisone , hydroxychloroquine , PE on coumadin , gastritis , fibromyalgia and insomnia presented to ER with c/o 1 wk h/o weakness , myalgias and 1 day h/o chest pain and left shoulder pain .     # CHILLS / MYALGIAS / LIKELY VIRAL ETIOLOGY DOUBT FLU AS SYMPTOMS HAVE RESOLVED / RA NOT TAKING PREDNISONE   - prednisone taper starting at 20 daily  - CTA negative for PE, CXR wnl  - supportive care.  - will continue with plaquenil .  - PT /OT eval.    # SLE FLARE (unlikely):  - ESR, CRP, C3, C4, RF all wnl  - will c/w plaquenil and steroid taper .    #  H/O PE :  - IV unfractionated heparin and coumadin    - # DISPO:  - pending therapeutic INR  -full code from home.

## 2018-11-29 NOTE — PROGRESS NOTE ADULT - SUBJECTIVE AND OBJECTIVE BOX
Patient was seen and examined. Spoke with RN. Chart reviewed  pt comf, extensive discussion with  and residents  did not tolerate xarelto, refuses lmwh  wishes iv heparin till inr therapeutic    No events overnight.  Vital Signs Last 24 Hrs  T(F): 97 (29 Nov 2018 05:00), Max: 98 (28 Nov 2018 21:50)  HR: 62 (29 Nov 2018 08:33) (59 - 87)  BP: 112/66 (29 Nov 2018 08:33) (86/52 - 144/87)  SpO2: 98% (29 Nov 2018 08:33) (98% - 99%)  MEDICATIONS  (STANDING):  docusate sodium 100 milliGRAM(s) Oral three times a day  hydroxychloroquine 200 milliGRAM(s) Oral daily  predniSONE   Tablet 20 milliGRAM(s) Oral daily    MEDICATIONS  (PRN):  ALPRAZolam 0.5 milliGRAM(s) Oral every 6 hours PRN anxiety  oxyCODONE    5 mG/acetaminophen 325 mG 2 Tablet(s) Oral every 4 hours PRN Moderate Pain (4 - 6)  senna 2 Tablet(s) Oral at bedtime PRN Constipation  zolpidem 5 milliGRAM(s) Oral at bedtime PRN Insomnia    Labs:                        11.2   4.63  )-----------( 258      ( 29 Nov 2018 08:44 )             34.4                         11.9   5.44  )-----------( 271      ( 28 Nov 2018 07:33 )             36.8     29 Nov 2018 08:44    138    |  101    |  10     ----------------------------<  84     4.2     |  23     |  0.7    28 Nov 2018 07:33    137    |  103    |  8      ----------------------------<  114    4.4     |  20     |  0.6      Ca    8.4        29 Nov 2018 08:44  Ca    8.5        28 Nov 2018 07:33  Mg     1.7       29 Nov 2018 08:44  Mg     1.9       27 Nov 2018 13:46    TPro  6.3    /  Alb  3.5    /  TBili  0.3    /  DBili  x      /  AST  22     /  ALT  10     /  AlkPhos  51     27 Nov 2018 13:46    PT/INR - ( 29 Nov 2018 08:44 )   PT: 15.70 sec;   INR: 1.37 ratio                 Radiology:    General: comfortable, NAD  Neurology: A&Ox3, nonfocal  Head:  Normocephalic, atraumatic  ENT:  Mucosa moist, no ulcerations  Neck:  Supple, no JVD,   Skin: no breakdowns (as per RN)  Resp: CTA B/L  CV: RRR, S1S2,   GI: Soft, NT, bowel sounds  MS: No edema, + peripheral pulses, FROM all 4 extremity

## 2018-11-30 VITALS
DIASTOLIC BLOOD PRESSURE: 52 MMHG | RESPIRATION RATE: 20 BRPM | SYSTOLIC BLOOD PRESSURE: 103 MMHG | TEMPERATURE: 97 F | HEART RATE: 60 BPM

## 2018-11-30 LAB
ANA PAT FLD IF-IMP: ABNORMAL
ANA TITR SER: ABNORMAL
ANION GAP SERPL CALC-SCNC: 11 MMOL/L — SIGNIFICANT CHANGE UP (ref 7–14)
APTT BLD: 82.3 SEC — CRITICAL HIGH (ref 27–39.2)
APTT BLD: >200 SEC — CRITICAL HIGH (ref 27–39.2)
APTT BLD: >200 SEC — CRITICAL HIGH (ref 27–39.2)
BASOPHILS # BLD AUTO: 0.04 K/UL — SIGNIFICANT CHANGE UP (ref 0–0.2)
BASOPHILS NFR BLD AUTO: 0.5 % — SIGNIFICANT CHANGE UP (ref 0–1)
BUN SERPL-MCNC: 8 MG/DL — LOW (ref 10–20)
CALCIUM SERPL-MCNC: 8.5 MG/DL — SIGNIFICANT CHANGE UP (ref 8.5–10.1)
CHLORIDE SERPL-SCNC: 103 MMOL/L — SIGNIFICANT CHANGE UP (ref 98–110)
CO2 SERPL-SCNC: 25 MMOL/L — SIGNIFICANT CHANGE UP (ref 17–32)
CREAT SERPL-MCNC: 0.7 MG/DL — SIGNIFICANT CHANGE UP (ref 0.7–1.5)
DSDNA AB SER-ACNC: <12 IU/ML — SIGNIFICANT CHANGE UP
EOSINOPHIL # BLD AUTO: 0.05 K/UL — SIGNIFICANT CHANGE UP (ref 0–0.7)
EOSINOPHIL NFR BLD AUTO: 0.6 % — SIGNIFICANT CHANGE UP (ref 0–8)
FLU A RESULT: NEGATIVE — SIGNIFICANT CHANGE UP
FLU A RESULT: NEGATIVE — SIGNIFICANT CHANGE UP
FLUAV AG NPH QL: NEGATIVE — SIGNIFICANT CHANGE UP
FLUBV AG NPH QL: NEGATIVE — SIGNIFICANT CHANGE UP
GLUCOSE SERPL-MCNC: 86 MG/DL — SIGNIFICANT CHANGE UP (ref 70–99)
HCT VFR BLD CALC: 34.6 % — LOW (ref 37–47)
HGB BLD-MCNC: 11.1 G/DL — LOW (ref 12–16)
IMM GRANULOCYTES NFR BLD AUTO: 0.2 % — SIGNIFICANT CHANGE UP (ref 0.1–0.3)
INR BLD: 1.6 RATIO — HIGH (ref 0.65–1.3)
INR BLD: 1.69 RATIO — HIGH (ref 0.65–1.3)
INR BLD: 1.74 RATIO — HIGH (ref 0.65–1.3)
LYMPHOCYTES # BLD AUTO: 2.44 K/UL — SIGNIFICANT CHANGE UP (ref 1.2–3.4)
LYMPHOCYTES # BLD AUTO: 29.5 % — SIGNIFICANT CHANGE UP (ref 20.5–51.1)
MAGNESIUM SERPL-MCNC: 1.7 MG/DL — LOW (ref 1.8–2.4)
MCHC RBC-ENTMCNC: 29.1 PG — SIGNIFICANT CHANGE UP (ref 27–31)
MCHC RBC-ENTMCNC: 32.1 G/DL — SIGNIFICANT CHANGE UP (ref 32–37)
MCV RBC AUTO: 90.8 FL — SIGNIFICANT CHANGE UP (ref 81–99)
MONOCYTES # BLD AUTO: 0.61 K/UL — HIGH (ref 0.1–0.6)
MONOCYTES NFR BLD AUTO: 7.4 % — SIGNIFICANT CHANGE UP (ref 1.7–9.3)
NEUTROPHILS # BLD AUTO: 5.1 K/UL — SIGNIFICANT CHANGE UP (ref 1.4–6.5)
NEUTROPHILS NFR BLD AUTO: 61.8 % — SIGNIFICANT CHANGE UP (ref 42.2–75.2)
NRBC # BLD: 0 /100 WBCS — SIGNIFICANT CHANGE UP (ref 0–0)
PLATELET # BLD AUTO: 288 K/UL — SIGNIFICANT CHANGE UP (ref 130–400)
POTASSIUM SERPL-MCNC: 3.6 MMOL/L — SIGNIFICANT CHANGE UP (ref 3.5–5)
POTASSIUM SERPL-SCNC: 3.6 MMOL/L — SIGNIFICANT CHANGE UP (ref 3.5–5)
PROTHROM AB SERPL-ACNC: 18.3 SEC — HIGH (ref 9.95–12.87)
PROTHROM AB SERPL-ACNC: 19.3 SEC — HIGH (ref 9.95–12.87)
PROTHROM AB SERPL-ACNC: 19.9 SEC — HIGH (ref 9.95–12.87)
RAPID RVP RESULT: DETECTED
RBC # BLD: 3.81 M/UL — LOW (ref 4.2–5.4)
RBC # FLD: 13.5 % — SIGNIFICANT CHANGE UP (ref 11.5–14.5)
RSV RESULT: NEGATIVE — SIGNIFICANT CHANGE UP
RSV RNA RESP QL NAA+PROBE: NEGATIVE — SIGNIFICANT CHANGE UP
RV+EV RNA SPEC QL NAA+PROBE: DETECTED
SODIUM SERPL-SCNC: 139 MMOL/L — SIGNIFICANT CHANGE UP (ref 135–146)
WBC # BLD: 8.26 K/UL — SIGNIFICANT CHANGE UP (ref 4.8–10.8)
WBC # FLD AUTO: 8.26 K/UL — SIGNIFICANT CHANGE UP (ref 4.8–10.8)

## 2018-11-30 RX ORDER — WARFARIN SODIUM 2.5 MG/1
1 TABLET ORAL
Qty: 6 | Refills: 0 | OUTPATIENT
Start: 2018-11-30 | End: 2018-12-05

## 2018-11-30 RX ORDER — ALPRAZOLAM 0.25 MG
1 TABLET ORAL
Qty: 0 | Refills: 0 | COMMUNITY

## 2018-11-30 RX ORDER — HEPARIN SODIUM 5000 [USP'U]/ML
1600 INJECTION INTRAVENOUS; SUBCUTANEOUS
Qty: 25000 | Refills: 0 | Status: DISCONTINUED | OUTPATIENT
Start: 2018-11-30 | End: 2018-11-30

## 2018-11-30 RX ORDER — BENZOCAINE AND MENTHOL 5; 1 G/100ML; G/100ML
1 LIQUID ORAL EVERY 4 HOURS
Qty: 0 | Refills: 0 | Status: DISCONTINUED | OUTPATIENT
Start: 2018-11-30 | End: 2018-11-30

## 2018-11-30 RX ORDER — WARFARIN SODIUM 2.5 MG/1
1 TABLET ORAL
Qty: 7 | Refills: 0 | OUTPATIENT
Start: 2018-11-30 | End: 2018-12-06

## 2018-11-30 RX ORDER — ALPRAZOLAM 0.25 MG
1 TABLET ORAL
Qty: 120 | Refills: 0 | OUTPATIENT
Start: 2018-11-30 | End: 2018-12-29

## 2018-11-30 RX ORDER — ZOLPIDEM TARTRATE 10 MG/1
1 TABLET ORAL
Qty: 0 | Refills: 0 | COMMUNITY

## 2018-11-30 RX ORDER — ZOLPIDEM TARTRATE 10 MG/1
1 TABLET ORAL
Qty: 30 | Refills: 0 | OUTPATIENT
Start: 2018-11-30 | End: 2018-12-29

## 2018-11-30 RX ADMIN — OXYCODONE AND ACETAMINOPHEN 2 TABLET(S): 5; 325 TABLET ORAL at 07:15

## 2018-11-30 RX ADMIN — Medication 20 MILLIGRAM(S): at 05:14

## 2018-11-30 RX ADMIN — BENZOCAINE AND MENTHOL 1 LOZENGE: 5; 1 LIQUID ORAL at 09:26

## 2018-11-30 RX ADMIN — OXYCODONE AND ACETAMINOPHEN 2 TABLET(S): 5; 325 TABLET ORAL at 11:36

## 2018-11-30 RX ADMIN — HEPARIN SODIUM 16 UNIT(S)/HR: 5000 INJECTION INTRAVENOUS; SUBCUTANEOUS at 04:53

## 2018-11-30 RX ADMIN — Medication 200 MILLIGRAM(S): at 11:38

## 2018-11-30 RX ADMIN — Medication 0.5 MILLIGRAM(S): at 09:29

## 2018-11-30 RX ADMIN — OXYCODONE AND ACETAMINOPHEN 2 TABLET(S): 5; 325 TABLET ORAL at 06:54

## 2018-11-30 NOTE — PROGRESS NOTE ADULT - REASON FOR ADMISSION
Chest pain , weakness

## 2018-11-30 NOTE — PROGRESS NOTE ADULT - ASSESSMENT
SUBJECTIVE:    Patient is a 38y old  Female who presents with a chief complaint of Chest pain , weakness (29 Nov 2018 13:38)    Currently admitted to medicine with the primary diagnosis of Chest pain, unspecified type     Today is hospital day 3d. This morning she is resting comfortably in bed and reports no new issues or overnight events.     PAST MEDICAL & SURGICAL HISTORY  PAST MEDICAL & SURGICAL HISTORY:  Rheumatoid arthritis  Gastritis  Fibromyalgia  Pulmonary embolism  Lupus (systemic lupus erythematosus)  H/O abdominal surgery    SOCIAL HISTORY:    ALLERGIES:  &quot;cold plasma&quot; (Hives)  acetaminophen (Hives)  IV Contrast (Hives)  Tomatoes (Hives)    MEDICATIONS:  STANDING MEDICATIONS  docusate sodium 100 milliGRAM(s) Oral three times a day  heparin  Infusion 1600 Unit(s)/Hr IV Continuous <Continuous>  hydroxychloroquine 200 milliGRAM(s) Oral daily  predniSONE   Tablet 20 milliGRAM(s) Oral daily    PRN MEDICATIONS  ALPRAZolam 0.5 milliGRAM(s) Oral every 6 hours PRN  oxyCODONE    5 mG/acetaminophen 325 mG 2 Tablet(s) Oral every 4 hours PRN  senna 2 Tablet(s) Oral at bedtime PRN  zolpidem 5 milliGRAM(s) Oral at bedtime PRN    VITALS:   T(F): 97.2  HR: 60  BP: 103/52  RR: 20  SpO2: 99%    LABS:                        11.2   4.63  )-----------( 258      ( 29 Nov 2018 08:44 )             34.4     11-29    138  |  101  |  10  ----------------------------<  84  4.2   |  23  |  0.7    Ca    8.4<L>      29 Nov 2018 08:44  Mg     1.7     11-29      PT/INR - ( 30 Nov 2018 01:10 )   PT: 18.30 sec;   INR: 1.60 ratio         PTT - ( 30 Nov 2018 01:10 )  PTT:>200.0 sec              RADIOLOGY:    PHYSICAL EXAM:  GEN: No acute distress  HEENT:   LUNGS: Clear to auscultation bilaterally   HEART: S1/S2 present. RRR.   ABD: Soft, non-tender, non-distended. Bowel sounds present  EXT:  NEURO: AAOX3    Assessment and Plan:  38 y o f with pmh of SLE , RA on prednisone , hydroxychloroquine , PE on coumadin , gastritis , fibromyalgia and insomnia presented to ER with c/o 1 wk h/o weakness , myalgias and 1 day h/o chest pain and left shoulder pain .     # CHILLS / MYALGIAS / LIKELY VIRAL ETIOLOGY DOUBT FLU AS SYMPTOMS HAVE RESOLVED / RA NOT TAKING PREDNISONE   - prednisone taper  - CTA negative for PE, CXR wnl  - supportive care.  - will continue with plaquenil .  - PT /OT eval.    # SLE FLARE (unlikely):  - ESR, CRP, C3, C4, RF all wnl  - ALLA 1:80, speckled  - will c/w plaquenil and steroid taper .    #  H/O PE :  - IV unfractionated heparin and coumadin  - monitor PTT/INR    - # DISPO:  -as per Dr. Champagne will d/c when there is therapeutic INR (got coumadin 10 mg last night)  -full code from home. SUBJECTIVE:    Patient is a 38y old  Female who presents with a chief complaint of Chest pain , weakness (29 Nov 2018 13:38)    Currently admitted to medicine with the primary diagnosis of Chest pain, unspecified type     Today is hospital day 3d. This morning she is resting comfortably in bed and reports no new issues or overnight events.     PAST MEDICAL & SURGICAL HISTORY  PAST MEDICAL & SURGICAL HISTORY:  Rheumatoid arthritis  Gastritis  Fibromyalgia  Pulmonary embolism  Lupus (systemic lupus erythematosus)  H/O abdominal surgery    SOCIAL HISTORY:    ALLERGIES:  &quot;cold plasma&quot; (Hives)  acetaminophen (Hives)  IV Contrast (Hives)  Tomatoes (Hives)    MEDICATIONS:  STANDING MEDICATIONS  docusate sodium 100 milliGRAM(s) Oral three times a day  heparin  Infusion 1600 Unit(s)/Hr IV Continuous <Continuous>  hydroxychloroquine 200 milliGRAM(s) Oral daily  predniSONE   Tablet 20 milliGRAM(s) Oral daily    PRN MEDICATIONS  ALPRAZolam 0.5 milliGRAM(s) Oral every 6 hours PRN  oxyCODONE    5 mG/acetaminophen 325 mG 2 Tablet(s) Oral every 4 hours PRN  senna 2 Tablet(s) Oral at bedtime PRN  zolpidem 5 milliGRAM(s) Oral at bedtime PRN    VITALS:   T(F): 97.2  HR: 60  BP: 103/52  RR: 20  SpO2: 99%    LABS:                        11.2   4.63  )-----------( 258      ( 29 Nov 2018 08:44 )             34.4     11-29    138  |  101  |  10  ----------------------------<  84  4.2   |  23  |  0.7    Ca    8.4<L>      29 Nov 2018 08:44  Mg     1.7     11-29      PT/INR - ( 30 Nov 2018 01:10 )   PT: 18.30 sec;   INR: 1.60 ratio         PTT - ( 30 Nov 2018 01:10 )  PTT:>200.0 sec              RADIOLOGY:    PHYSICAL EXAM:  GEN: No acute distress  HEENT: NCAT  LUNGS: Clear to auscultation bilaterally   HEART: S1/S2 present. RRR.   ABD: Soft, non-tender, non-distended  EXT: no c/c/e  NEURO: AAOX3    Assessment and Plan:  38 y o f with pmh of SLE , RA on prednisone , hydroxychloroquine , PE on coumadin , gastritis , fibromyalgia and insomnia presented to ER with c/o 1 wk h/o weakness , myalgias and 1 day h/o chest pain and left shoulder pain .     # CHILLS / MYALGIAS / LIKELY VIRAL ETIOLOGY DOUBT FLU AS SYMPTOMS HAVE RESOLVED / RA NOT TAKING PREDNISONE   - prednisone taper  - CTA negative for PE, CXR wnl  - supportive care.  - will continue with plaquenil .  - PT /OT eval.    # SLE FLARE (unlikely):  - ESR, CRP, C3, C4, RF all wnl  - ALLA 1:80, speckled  - will c/w plaquenil and steroid taper .    #  H/O PE :  - coumadin    - # DISPO:  -home w coumadin and f/u INR w DR Chamberlain next week  -full code from home.

## 2018-12-04 DIAGNOSIS — R07.9 CHEST PAIN, UNSPECIFIED: ICD-10-CM

## 2018-12-04 DIAGNOSIS — M79.7 FIBROMYALGIA: ICD-10-CM

## 2018-12-04 DIAGNOSIS — M32.9 SYSTEMIC LUPUS ERYTHEMATOSUS, UNSPECIFIED: ICD-10-CM

## 2018-12-04 DIAGNOSIS — R79.1 ABNORMAL COAGULATION PROFILE: ICD-10-CM

## 2018-12-04 DIAGNOSIS — B34.9 VIRAL INFECTION, UNSPECIFIED: ICD-10-CM

## 2018-12-04 DIAGNOSIS — Z91.14 PATIENT'S OTHER NONCOMPLIANCE WITH MEDICATION REGIMEN: ICD-10-CM

## 2018-12-04 DIAGNOSIS — M06.9 RHEUMATOID ARTHRITIS, UNSPECIFIED: ICD-10-CM

## 2018-12-04 DIAGNOSIS — Z86.711 PERSONAL HISTORY OF PULMONARY EMBOLISM: ICD-10-CM

## 2018-12-04 LAB
CULTURE RESULTS: SIGNIFICANT CHANGE UP
SPECIMEN SOURCE: SIGNIFICANT CHANGE UP

## 2019-01-08 ENCOUNTER — OUTPATIENT (OUTPATIENT)
Dept: OUTPATIENT SERVICES | Facility: HOSPITAL | Age: 39
LOS: 1 days | Discharge: HOME | End: 2019-01-08

## 2019-01-08 DIAGNOSIS — Z98.890 OTHER SPECIFIED POSTPROCEDURAL STATES: Chronic | ICD-10-CM

## 2019-01-08 DIAGNOSIS — I27.82 CHRONIC PULMONARY EMBOLISM: ICD-10-CM

## 2019-01-08 DIAGNOSIS — Z79.01 LONG TERM (CURRENT) USE OF ANTICOAGULANTS: ICD-10-CM

## 2019-01-08 PROBLEM — M06.9 RHEUMATOID ARTHRITIS, UNSPECIFIED: Chronic | Status: ACTIVE | Noted: 2018-11-27

## 2019-01-08 PROBLEM — M79.7 FIBROMYALGIA: Chronic | Status: ACTIVE | Noted: 2018-11-27

## 2019-01-08 PROBLEM — K29.70 GASTRITIS, UNSPECIFIED, WITHOUT BLEEDING: Chronic | Status: ACTIVE | Noted: 2018-11-27

## 2019-01-08 LAB
POCT INR: 2.6 RATIO — HIGH (ref 0.9–1.2)
POCT PT: 31.1 SEC — HIGH (ref 10–13.4)

## 2019-01-14 ENCOUNTER — EMERGENCY (EMERGENCY)
Facility: HOSPITAL | Age: 39
LOS: 1 days | Discharge: HOME | End: 2019-01-16
Attending: EMERGENCY MEDICINE | Admitting: EMERGENCY MEDICINE
Payer: MEDICARE

## 2019-01-14 VITALS
OXYGEN SATURATION: 97 % | RESPIRATION RATE: 18 BRPM | HEART RATE: 67 BPM | TEMPERATURE: 99 F | SYSTOLIC BLOOD PRESSURE: 122 MMHG | DIASTOLIC BLOOD PRESSURE: 81 MMHG

## 2019-01-14 DIAGNOSIS — R55 SYNCOPE AND COLLAPSE: ICD-10-CM

## 2019-01-14 DIAGNOSIS — M06.9 RHEUMATOID ARTHRITIS, UNSPECIFIED: ICD-10-CM

## 2019-01-14 DIAGNOSIS — Z79.01 LONG TERM (CURRENT) USE OF ANTICOAGULANTS: ICD-10-CM

## 2019-01-14 DIAGNOSIS — R42 DIZZINESS AND GIDDINESS: ICD-10-CM

## 2019-01-14 DIAGNOSIS — M32.9 SYSTEMIC LUPUS ERYTHEMATOSUS, UNSPECIFIED: ICD-10-CM

## 2019-01-14 DIAGNOSIS — R07.9 CHEST PAIN, UNSPECIFIED: ICD-10-CM

## 2019-01-14 DIAGNOSIS — Z98.890 OTHER SPECIFIED POSTPROCEDURAL STATES: Chronic | ICD-10-CM

## 2019-01-14 DIAGNOSIS — Z86.711 PERSONAL HISTORY OF PULMONARY EMBOLISM: ICD-10-CM

## 2019-01-14 DIAGNOSIS — M79.7 FIBROMYALGIA: ICD-10-CM

## 2019-01-14 LAB
ALBUMIN SERPL ELPH-MCNC: 4.7 G/DL — SIGNIFICANT CHANGE UP (ref 3.5–5.2)
ALP SERPL-CCNC: 48 U/L — SIGNIFICANT CHANGE UP (ref 30–115)
ALT FLD-CCNC: 12 U/L — SIGNIFICANT CHANGE UP (ref 0–41)
ANION GAP SERPL CALC-SCNC: 16 MMOL/L — HIGH (ref 7–14)
APPEARANCE UR: ABNORMAL
APTT BLD: 39.2 SEC — SIGNIFICANT CHANGE UP (ref 27–39.2)
AST SERPL-CCNC: 18 U/L — SIGNIFICANT CHANGE UP (ref 0–41)
BASE EXCESS BLDV CALC-SCNC: 0.8 MMOL/L — SIGNIFICANT CHANGE UP (ref -2–2)
BASOPHILS # BLD AUTO: 0.02 K/UL — SIGNIFICANT CHANGE UP (ref 0–0.2)
BASOPHILS NFR BLD AUTO: 0.4 % — SIGNIFICANT CHANGE UP (ref 0–1)
BILIRUB SERPL-MCNC: 0.4 MG/DL — SIGNIFICANT CHANGE UP (ref 0.2–1.2)
BILIRUB UR-MCNC: ABNORMAL
BUN SERPL-MCNC: 11 MG/DL — SIGNIFICANT CHANGE UP (ref 10–20)
CA-I SERPL-SCNC: 1.23 MMOL/L — SIGNIFICANT CHANGE UP (ref 1.12–1.3)
CALCIUM SERPL-MCNC: 9.6 MG/DL — SIGNIFICANT CHANGE UP (ref 8.5–10.1)
CHLORIDE SERPL-SCNC: 98 MMOL/L — SIGNIFICANT CHANGE UP (ref 98–110)
CO2 SERPL-SCNC: 23 MMOL/L — SIGNIFICANT CHANGE UP (ref 17–32)
COLOR SPEC: ABNORMAL
CREAT SERPL-MCNC: 0.7 MG/DL — SIGNIFICANT CHANGE UP (ref 0.7–1.5)
DIFF PNL FLD: ABNORMAL
EOSINOPHIL # BLD AUTO: 0 K/UL — SIGNIFICANT CHANGE UP (ref 0–0.7)
EOSINOPHIL NFR BLD AUTO: 0 % — SIGNIFICANT CHANGE UP (ref 0–8)
GAS PNL BLDV: 142 MMOL/L — SIGNIFICANT CHANGE UP (ref 136–145)
GAS PNL BLDV: SIGNIFICANT CHANGE UP
GLUCOSE SERPL-MCNC: 102 MG/DL — HIGH (ref 70–99)
GLUCOSE UR QL: NEGATIVE — SIGNIFICANT CHANGE UP
HCG SERPL QL: NEGATIVE — SIGNIFICANT CHANGE UP
HCO3 BLDV-SCNC: 27 MMOL/L — SIGNIFICANT CHANGE UP (ref 22–29)
HCT VFR BLD CALC: 39.4 % — SIGNIFICANT CHANGE UP (ref 37–47)
HCT VFR BLDA CALC: 40.3 % — SIGNIFICANT CHANGE UP (ref 34–44)
HGB BLD CALC-MCNC: 13.2 G/DL — LOW (ref 14–18)
HGB BLD-MCNC: 12.6 G/DL — SIGNIFICANT CHANGE UP (ref 12–16)
IMM GRANULOCYTES NFR BLD AUTO: 0.2 % — SIGNIFICANT CHANGE UP (ref 0.1–0.3)
INR BLD: 2.55 RATIO — HIGH (ref 0.65–1.3)
KETONES UR-MCNC: >=80
LACTATE BLDV-MCNC: 1.5 MMOL/L — SIGNIFICANT CHANGE UP (ref 0.5–1.6)
LACTATE SERPL-SCNC: 1.4 MMOL/L — SIGNIFICANT CHANGE UP (ref 0.5–2.2)
LEUKOCYTE ESTERASE UR-ACNC: ABNORMAL
LIDOCAIN IGE QN: 16 U/L — SIGNIFICANT CHANGE UP (ref 7–60)
LYMPHOCYTES # BLD AUTO: 0.75 K/UL — LOW (ref 1.2–3.4)
LYMPHOCYTES # BLD AUTO: 13.9 % — LOW (ref 20.5–51.1)
MCHC RBC-ENTMCNC: 28.7 PG — SIGNIFICANT CHANGE UP (ref 27–31)
MCHC RBC-ENTMCNC: 32 G/DL — SIGNIFICANT CHANGE UP (ref 32–37)
MCV RBC AUTO: 89.7 FL — SIGNIFICANT CHANGE UP (ref 81–99)
MONOCYTES # BLD AUTO: 0.2 K/UL — SIGNIFICANT CHANGE UP (ref 0.1–0.6)
MONOCYTES NFR BLD AUTO: 3.7 % — SIGNIFICANT CHANGE UP (ref 1.7–9.3)
NEUTROPHILS # BLD AUTO: 4.4 K/UL — SIGNIFICANT CHANGE UP (ref 1.4–6.5)
NEUTROPHILS NFR BLD AUTO: 81.8 % — HIGH (ref 42.2–75.2)
NITRITE UR-MCNC: NEGATIVE — SIGNIFICANT CHANGE UP
NRBC # BLD: 0 /100 WBCS — SIGNIFICANT CHANGE UP (ref 0–0)
PCO2 BLDV: 47 MMHG — SIGNIFICANT CHANGE UP (ref 41–51)
PH BLDV: 7.36 — SIGNIFICANT CHANGE UP (ref 7.26–7.43)
PH UR: 5.5 — SIGNIFICANT CHANGE UP (ref 5–8)
PLATELET # BLD AUTO: 351 K/UL — SIGNIFICANT CHANGE UP (ref 130–400)
PO2 BLDV: 31 MMHG — SIGNIFICANT CHANGE UP (ref 20–40)
POTASSIUM BLDV-SCNC: 3.8 MMOL/L — SIGNIFICANT CHANGE UP (ref 3.3–5.6)
POTASSIUM SERPL-MCNC: 4 MMOL/L — SIGNIFICANT CHANGE UP (ref 3.5–5)
POTASSIUM SERPL-SCNC: 4 MMOL/L — SIGNIFICANT CHANGE UP (ref 3.5–5)
PROT SERPL-MCNC: 7.8 G/DL — SIGNIFICANT CHANGE UP (ref 6–8)
PROT UR-MCNC: 100
PROTHROM AB SERPL-ACNC: 29.1 SEC — HIGH (ref 9.95–12.87)
RBC # BLD: 4.39 M/UL — SIGNIFICANT CHANGE UP (ref 4.2–5.4)
RBC # FLD: 14.9 % — HIGH (ref 11.5–14.5)
RBC CASTS # UR COMP ASSIST: >50 /HPF
SAO2 % BLDV: 56 % — SIGNIFICANT CHANGE UP
SODIUM SERPL-SCNC: 137 MMOL/L — SIGNIFICANT CHANGE UP (ref 135–146)
SP GR SPEC: >=1.03 — SIGNIFICANT CHANGE UP (ref 1.01–1.03)
TROPONIN T SERPL-MCNC: <0.01 NG/ML — SIGNIFICANT CHANGE UP
UROBILINOGEN FLD QL: 1 (ref 0.2–0.2)
WBC # BLD: 5.38 K/UL — SIGNIFICANT CHANGE UP (ref 4.8–10.8)
WBC # FLD AUTO: 5.38 K/UL — SIGNIFICANT CHANGE UP (ref 4.8–10.8)
WBC UR QL: ABNORMAL /HPF

## 2019-01-14 PROCEDURE — 93306 TTE W/DOPPLER COMPLETE: CPT | Mod: 26

## 2019-01-14 RX ORDER — SODIUM CHLORIDE 9 MG/ML
1000 INJECTION, SOLUTION INTRAVENOUS ONCE
Qty: 0 | Refills: 0 | Status: COMPLETED | OUTPATIENT
Start: 2019-01-14 | End: 2019-01-14

## 2019-01-14 RX ORDER — OXYCODONE AND ACETAMINOPHEN 5; 325 MG/1; MG/1
2 TABLET ORAL ONCE
Qty: 0 | Refills: 0 | Status: DISCONTINUED | OUTPATIENT
Start: 2019-01-14 | End: 2019-01-14

## 2019-01-14 RX ORDER — ALPRAZOLAM 0.25 MG
0.5 TABLET ORAL ONCE
Qty: 0 | Refills: 0 | Status: DISCONTINUED | OUTPATIENT
Start: 2019-01-14 | End: 2019-01-14

## 2019-01-14 RX ADMIN — OXYCODONE AND ACETAMINOPHEN 2 TABLET(S): 5; 325 TABLET ORAL at 22:21

## 2019-01-14 RX ADMIN — SODIUM CHLORIDE 1000 MILLILITER(S): 9 INJECTION, SOLUTION INTRAVENOUS at 22:21

## 2019-01-14 RX ADMIN — Medication 0.5 MILLIGRAM(S): at 23:36

## 2019-01-14 NOTE — ED PROVIDER NOTE - PHYSICAL EXAMINATION
Physical Examination:   VITAL SIGNS: I have reviewed vital signs.  CONSTITUTIONAL: well appearing, NAD.   SKIN: Skin exam is warm and dry.  No rashes  HEAD: Normocephalic; atraumatic.  EYES: PERRL, EOM intact; pale conjunctiva and sclera clear.  ENT: No nasal discharge; airway clear.  NECK:  No nuchal rigidity.  CARD: S1, S2 reg  RESP: CTAB. No wheezes, rales or rhonchi.  ABD: soft; non-distended; non-tender  EXT: Normal ROM. No edema.  NEURO: Alert, oriented. Grossly unremarkable. No focal deficits. Ambulatory, but feels weak and uses assistance to walk.  PSYCH: Cooperative, appropriate.

## 2019-01-14 NOTE — ED PROVIDER NOTE - PROGRESS NOTE DETAILS
Pt requesting xanax 0.5mg for her anxiety related to having a head CT.  States that she takes xanax at home.

## 2019-01-14 NOTE — ED PROVIDER NOTE - OBJECTIVE STATEMENT
39 y/o F with PMH OA, RA, SLE, fibromyalgia, h/o PE on coumadin - here with an episode of syncope that was observed earlier this evening by her  who is at the bedside.  Per , they were on the bed talking and suddenly she fell over sideways on the bed.  Not sure how long she was unconscious.  Pt has no awareness of what happened.  Pt back to baseline now.  Pt has been feeling weak the past 2-3 days, with a feeling of unsteady gait.  Also n/v yesterday and some this AM.  Loose stools.  Also reports an episode of CP earlier this evening over her L breast and wrapping around.  No CP now. No sob. No ap.  Pt states that her prednisone and hydroxychloroquine doses were recently increased. 39 y/o F with PMH OA, RA, SLE, fibromyalgia, h/o PE on coumadin - here with an episode of syncope that was observed earlier this evening by her  who is at the bedside.  Per , they were on the bed talking and suddenly she fell over sideways on the bed.  Not sure how long she was unconscious.  Pt has no awareness of what happened.  Pt back to baseline now.  Pt has been feeling weak the past 2-3 days, with a feeling of unsteady gait.  Also n/v yesterday and some this AM.  Loose stools.  Also reports an episode of CP earlier this evening over her L breast and wrapping around.  No CP now. No sob. No ap.  Pt states that her prednisone and hydroxychloroquine doses were recently increased.  Pt also c/o generalized joint pains that feel like her fibromyalgia for which she normally takes percocet 10/325. 39 y/o F with PMH OA, RA, SLE, fibromyalgia, h/o PE on coumadin - here with an episode of syncope that was observed earlier this evening by her  who is at the bedside.  Per , they were on the bed talking and suddenly she fell over sideways on the bed.  Not sure how long she was unconscious.  Pt has no awareness of what happened.  Pt back to baseline now.  Pt has been feeling weak the past 2-3 days, with a feeling of unsteady gait.  Also n/v yesterday and some this AM.  Loose stools.  Also reports an episode of CP earlier this evening over her L breast and wrapping around.  No CP now. No sob. No ap.  Pt states that her prednisone and hydroxychloroquine doses were recently increased.  Pt also c/o generalized joint pains that feel like her fibromyalgia for which she normally takes percocet 10/325.  LMP now.

## 2019-01-14 NOTE — ED PROVIDER NOTE - MEDICAL DECISION MAKING DETAILS
Patient to be placed into observation status for syncope and CP workup.  There is a lack of diagnostic certainty, where a more precise diagnosis could decide inpatient admission or discharge to home, and/or need for therapeutic intensity, where extensive therapy has a reasonable possibility of abating the patient's presenting condition, and thereby prevents inpatient admission.

## 2019-01-14 NOTE — ED PROVIDER NOTE - NS ED ROS FT
Review of Systems:  · CONSTITUTIONAL: no fever and no chills.  · EYES: no discharge, no irritation, no pain, no redness, and no visual changes.  · ENMT: Ears: no ear pain and no hearing problems. Nose: no nasal congestion and no nasal drainage. Mouth/Throat: no dysphagia, no hoarseness and no throat pain.  · CARDIOVASCULAR: no chest pain  · RESPIRATORY: no cough, and no shortness of breath.  · GASTROINTESTINAL: no abdominal pain, no diarrhea, + nausea and vomiting.  · GENITOURINARY: no dysuria  · MUSCULOSKELETAL: no back pain, +myalgias, +generalized weakness.  · SKIN: no rashes.  · NEURO: + loss of consciousness, no headache.  · ROS STATEMENT: all other ROS negative except as per HPI

## 2019-01-15 LAB — TROPONIN T SERPL-MCNC: <0.01 NG/ML — SIGNIFICANT CHANGE UP

## 2019-01-15 PROCEDURE — 93970 EXTREMITY STUDY: CPT | Mod: 26

## 2019-01-15 RX ORDER — WARFARIN SODIUM 2.5 MG/1
10 TABLET ORAL ONCE
Qty: 0 | Refills: 0 | Status: COMPLETED | OUTPATIENT
Start: 2019-01-15 | End: 2019-01-15

## 2019-01-15 RX ORDER — ALPRAZOLAM 0.25 MG
0.5 TABLET ORAL ONCE
Qty: 0 | Refills: 0 | Status: DISCONTINUED | OUTPATIENT
Start: 2019-01-15 | End: 2019-01-15

## 2019-01-15 RX ORDER — ZOLPIDEM TARTRATE 10 MG/1
5 TABLET ORAL ONCE
Qty: 0 | Refills: 0 | Status: DISCONTINUED | OUTPATIENT
Start: 2019-01-15 | End: 2019-01-15

## 2019-01-15 RX ORDER — OXYCODONE AND ACETAMINOPHEN 5; 325 MG/1; MG/1
2 TABLET ORAL EVERY 4 HOURS
Qty: 0 | Refills: 0 | Status: DISCONTINUED | OUTPATIENT
Start: 2019-01-15 | End: 2019-01-16

## 2019-01-15 RX ORDER — LANOLIN ALCOHOL/MO/W.PET/CERES
5 CREAM (GRAM) TOPICAL AT BEDTIME
Qty: 0 | Refills: 0 | Status: DISCONTINUED | OUTPATIENT
Start: 2019-01-15 | End: 2019-01-16

## 2019-01-15 RX ORDER — OXYCODONE AND ACETAMINOPHEN 5; 325 MG/1; MG/1
2 TABLET ORAL ONCE
Qty: 0 | Refills: 0 | Status: DISCONTINUED | OUTPATIENT
Start: 2019-01-15 | End: 2019-01-15

## 2019-01-15 RX ORDER — ADENOSINE 3 MG/ML
60 INJECTION INTRAVENOUS ONCE
Qty: 0 | Refills: 0 | Status: COMPLETED | OUTPATIENT
Start: 2019-01-15 | End: 2019-01-15

## 2019-01-15 RX ORDER — HYDROXYCHLOROQUINE SULFATE 200 MG
200 TABLET ORAL ONCE
Qty: 0 | Refills: 0 | Status: COMPLETED | OUTPATIENT
Start: 2019-01-15 | End: 2019-01-15

## 2019-01-15 RX ADMIN — OXYCODONE AND ACETAMINOPHEN 2 TABLET(S): 5; 325 TABLET ORAL at 13:05

## 2019-01-15 RX ADMIN — Medication 5 MILLIGRAM(S): at 21:45

## 2019-01-15 RX ADMIN — Medication 0.5 MILLIGRAM(S): at 22:27

## 2019-01-15 RX ADMIN — OXYCODONE AND ACETAMINOPHEN 2 TABLET(S): 5; 325 TABLET ORAL at 11:54

## 2019-01-15 RX ADMIN — WARFARIN SODIUM 10 MILLIGRAM(S): 2.5 TABLET ORAL at 21:45

## 2019-01-15 RX ADMIN — Medication 0.5 MILLIGRAM(S): at 11:56

## 2019-01-15 RX ADMIN — OXYCODONE AND ACETAMINOPHEN 2 TABLET(S): 5; 325 TABLET ORAL at 04:31

## 2019-01-15 RX ADMIN — ADENOSINE 600 MILLIGRAM(S): 3 INJECTION INTRAVENOUS at 15:55

## 2019-01-15 RX ADMIN — Medication 200 MILLIGRAM(S): at 05:32

## 2019-01-15 RX ADMIN — OXYCODONE AND ACETAMINOPHEN 2 TABLET(S): 5; 325 TABLET ORAL at 19:15

## 2019-01-15 RX ADMIN — ZOLPIDEM TARTRATE 5 MILLIGRAM(S): 10 TABLET ORAL at 21:46

## 2019-01-15 RX ADMIN — OXYCODONE AND ACETAMINOPHEN 2 TABLET(S): 5; 325 TABLET ORAL at 08:16

## 2019-01-15 RX ADMIN — Medication 20 MILLIGRAM(S): at 11:55

## 2019-01-15 NOTE — ED CDU PROVIDER DISPOSITION NOTE - CLINICAL COURSE
39yo woman multiple medical problems including SLE/RA on predisone, PE on coumadin, fibromyalgia on chronic opioids was placed in CDU for syncope workup after unremarkable ED eval with labs, EKG, CXR. She was monitored in CDU without incident, repeat EKG and enzymes ok. Echo unremarkable. Nuclear stress test negative for ischemia. Pt initially resisted d/c home due to her need for chronic pain management, plan was made for her to sleep in CDU and go home in the morning.

## 2019-01-15 NOTE — ED CDU PROVIDER DISPOSITION NOTE - CARE PROVIDER_API CALL
Anuel Frost (MD), Cardiovascular Disease; Internal Medicine; Interventional Cardiology  [address] [place] [address]  Phone: [phone]  Fax: [phone]

## 2019-01-15 NOTE — ED CDU PROVIDER INITIAL DAY NOTE - PROGRESS NOTE DETAILS
pt. in nad, will keep overnight. will give pt.'s pm meds. Nuclear stress test negative for ischemia, however pt still feeling weak and does not want to be d/c home tonight. She complains of persistent dizziness and is concerned that she will have to "turn around and come right back" if things don't go well at home. I examined her, she appears chronically ill but nontoxic, lungs CTA, CV1S2 RRR abd soft. Malar rash noted. Able to sit at edge of bed without difficulty but does not feel comfortable walking (walker is at home). No pronator drift, no cerebellar signs. We spoke extensively about utility of further workup in CDU and ultimately pt was reassured, asked to sleep in CDU and be d/c in the morning.

## 2019-01-15 NOTE — ED CDU PROVIDER INITIAL DAY NOTE - MEDICAL DECISION MAKING DETAILS
Patient with two sets negative cardiac enzymes.  Will send for nuclear stress and duplex of lower extremities.  Coumadin therapeutic at 2.55.  No dyspnea.

## 2019-01-15 NOTE — ED ADULT NURSE REASSESSMENT NOTE - NS ED NURSE REASSESS COMMENT FT1
pt placed into OBS. moved into room 2B, placed on cardiac monitor. made comfortable in stretcher. VSS. denies pain at this time. will monitor

## 2019-01-15 NOTE — ED ADULT NURSE REASSESSMENT NOTE - NS ED NURSE REASSESS COMMENT FT1
Pt assessed, returned from MRI, Pt resting comfortably, denies discomfort or pain at this time. continuous cardiac monitoring maintained. ref. bed alarm. denies cp at this time. medications given as per md orders.

## 2019-01-15 NOTE — ED CDU PROVIDER INITIAL DAY NOTE - NS ED ROS FT
Review of Systems    Constitutional: (-) fever  Cardiovascular: (+) chest pain, (-) syncope  Respiratory: (-) cough, (+) shortness of breath  Gastrointestinal: see hpi   Musculoskeletal: (-) neck pain, (-) back pain  Integumentary: (-) rash, (-) edema  Neurological: (-) headache    **All other ROS negative except as per above/HPI**

## 2019-01-15 NOTE — ED CDU PROVIDER DISPOSITION NOTE - ATTENDING CONTRIBUTION TO CARE
37yo woman multiple medical problems including SLE/RA on predisone, PE on coumadin, fibromyalgia on chronic opioids was placed in CDU for syncope workup after unremarkable ED eval with labs, EKG, CXR. She was monitored in CDU without incident, repeat EKG and enzymes ok. Echo unremarkable. Nuclear stress test negative for ischemia. Pt initially resisted d/c home due to her need for chronic pain management, plan was made for her to sleep in CDU and go home in the morning.

## 2019-01-15 NOTE — ED ADULT NURSE REASSESSMENT NOTE - NS ED NURSE REASSESS COMMENT FT1
orthostatic BP's attempted to be taken on pt. pt refused at this time. requested for them to be taken later

## 2019-01-15 NOTE — ED ADULT NURSE NOTE - NSIMPLEMENTINTERV_GEN_ALL_ED
Implemented All Universal Safety Interventions:  [place] to call system. Call bell, personal items and telephone within reach. Instruct patient to call for assistance. Room bathroom lighting operational. Non-slip footwear when patient is off stretcher. Physically safe environment: no spills, clutter or unnecessary equipment. Stretcher in lowest position, wheels locked, appropriate side rails in place.

## 2019-01-15 NOTE — ED ADULT NURSE NOTE - OBJECTIVE STATEMENT
pt BIBA for an episode of syncope at home earlier, witnessed by . pt was on the bed and "slumped over". pt reports she is having chest pains here in ED. reports dizziness, anxiety. denies nausea, denies SOB

## 2019-01-15 NOTE — ED CDU PROVIDER INITIAL DAY NOTE - PHYSICAL EXAMINATION
PHYSICAL EXAM:    GENERAL: Alert, appears stated age, well appearing, non-toxic  SKIN: Warm, pink and dry. MMM.   EYE: Normal lids/conjunctiva  ENT: Normal hearing, patent oropharynx   NECK: +supple. No meningismus  Pulm: Bilateral BS, normal resp effort, no wheezes, stridor, or retractions  CV: RRR, no M/R/G, 2+and = radial pulses  Abd: soft, non-tender, non-distended  Mskel: no erythema, cyanosis, edema. no calf tenderness  Neuro: AAOx3,. 5/5 strength throughout.

## 2019-01-15 NOTE — ED CDU PROVIDER INITIAL DAY NOTE - NS ED MD PROGRESS NOTE PROVIDER NAME2 FT
[facility]  Obstetrics  Postpartum Progress Note    Patient Name: Alejandra Garcia  MRN: [id number]  Admission Date: 7/10/2017  Hospital Length of Stay: 2 days  Attending Physician: Katarzyna Dexter MD  Primary Care Provider: Alta Bowie MD    Subjective:     Principal Problem:, delivered    Hospital course: 7/10/17 - OB ED visit for contractions, subsequent admission to L&D for expectant management.   2017 - PPD #1 s/p . Pt has MS, currently asymptomatic. Doing well, meeting PP milestones.     Interval History:     She is doing well this morning. She is tolerating a regular diet without nausea or vomiting. She is voiding spontaneously. She is ambulating. She has passed flatus, and has not a BM. Vaginal bleeding is mild. She denies fever or chills. Abdominal pain is mild and controlled with oral medications. She is breastfeeding. Female infant.    Objective:     Vital Signs (Most Recent):  Temp: 98.3 °F (36.8 °C) (17)  Pulse: 98 (17)  Resp: 18 (17)  BP: (!) 106/58 (17)  SpO2: 97 % (17) Vital Signs (24h Range):  Temp:  [98 °F (36.7 °C)-98.8 °F (37.1 °C)] 98.3 °F (36.8 °C)  Pulse:  [] 98  Resp:  [18] 18  SpO2:  [79 %-99 %] 97 %  BP: (106-147)/(50-81) 106/58     Weight: 81.6 kg (180 lb)  Body mass index is 32.92 kg/m².      Intake/Output Summary (Last 24 hours) at 17 0939  Last data filed at 17 0200   Gross per 24 hour   Intake             1850 ml   Output             1200 ml   Net              650 ml       Significant Labs:  Lab Results   Component Value Date    GROUPTRH O POS 07/10/2017    HEPBSAG Negative 2016    STREPBCULT No Group B Streptococcus isolated 2017       Recent Labs  Lab 17  0028   HGB 9.1*   HCT 27.4*       CBC:   Recent Labs  Lab 17  0028   WBC 18.85*   RBC 3.04*   HGB 9.1*   HCT 27.4*      MCV 90   MCH 29.9   MCHC 33.2     I have personallly reviewed all  pertinent lab results from the last 24 hours.    Physical Exam:   Constitutional: She is oriented to person, place, and time. She appears well-developed and well-nourished. No distress.    HENT:   Head: Normocephalic and atraumatic.    Eyes: EOM are normal. No scleral icterus.    Neck: Normal range of motion.    Cardiovascular: Normal rate, regular rhythm, normal heart sounds and intact distal pulses.  Exam reveals no cyanosis.     Pulmonary/Chest: Effort normal and breath sounds normal.        Abdominal: Soft. Bowel sounds are normal.     Genitourinary: Vagina normal and uterus normal.               Neurological: She is alert and oriented to person, place, and time.    Skin: Skin is warm and dry. She is not diaphoretic. No cyanosis.    Psychiatric: She has a normal mood and affect. Her behavior is normal. Judgment and thought content normal.       Assessment/Plan:     23 y.o. female  for:    Previous  delivery affecting pregnancy    Successful         Anemia in pregnancy    PT on iron and colace.        Multiple sclerosis    - Symptoms have not complicated pregnancy  - Currently not taking Rebif during pregnancy  - To f/u with PCP regarding restart of Rebif        * , delivered    Postpartum care:  - Patient doing well. Continue routine management and advances.  - Continue PO pain meds. Pain well controlled.  - Encourage ambulation.   - Heme: Pre Delivery h/h  --> Post Delivery h/h   - Contraception - defer to 6 week f/u  - Lactation - The patient is breast feeding. Lactation nurse following along PRN  - Rh Status - POS                    Disposition: As patient has met all milestones, will plan to discharge today.    Shahzad Moreno MD  Obstetrics  [facility]     [facility]

## 2019-01-16 VITALS
SYSTOLIC BLOOD PRESSURE: 100 MMHG | HEART RATE: 63 BPM | DIASTOLIC BLOOD PRESSURE: 59 MMHG | OXYGEN SATURATION: 98 % | TEMPERATURE: 97 F | RESPIRATION RATE: 18 BRPM

## 2019-01-16 RX ORDER — OXYCODONE AND ACETAMINOPHEN 5; 325 MG/1; MG/1
2 TABLET ORAL ONCE
Qty: 0 | Refills: 0 | Status: DISCONTINUED | OUTPATIENT
Start: 2019-01-16 | End: 2019-01-16

## 2019-01-16 RX ORDER — ALPRAZOLAM 0.25 MG
0.25 TABLET ORAL ONCE
Qty: 0 | Refills: 0 | Status: DISCONTINUED | OUTPATIENT
Start: 2019-01-16 | End: 2019-01-16

## 2019-01-16 RX ADMIN — OXYCODONE AND ACETAMINOPHEN 2 TABLET(S): 5; 325 TABLET ORAL at 11:52

## 2019-01-16 RX ADMIN — OXYCODONE AND ACETAMINOPHEN 2 TABLET(S): 5; 325 TABLET ORAL at 11:51

## 2019-01-16 RX ADMIN — OXYCODONE AND ACETAMINOPHEN 2 TABLET(S): 5; 325 TABLET ORAL at 09:17

## 2019-01-16 RX ADMIN — OXYCODONE AND ACETAMINOPHEN 2 TABLET(S): 5; 325 TABLET ORAL at 07:24

## 2019-01-16 RX ADMIN — Medication 0.25 MILLIGRAM(S): at 09:16

## 2019-01-16 RX ADMIN — OXYCODONE AND ACETAMINOPHEN 2 TABLET(S): 5; 325 TABLET ORAL at 01:07

## 2019-01-23 ENCOUNTER — OUTPATIENT (OUTPATIENT)
Dept: OUTPATIENT SERVICES | Facility: HOSPITAL | Age: 39
LOS: 1 days | Discharge: HOME | End: 2019-01-23

## 2019-01-23 DIAGNOSIS — I27.82 CHRONIC PULMONARY EMBOLISM: ICD-10-CM

## 2019-01-23 DIAGNOSIS — Z79.01 LONG TERM (CURRENT) USE OF ANTICOAGULANTS: ICD-10-CM

## 2019-01-23 DIAGNOSIS — Z98.890 OTHER SPECIFIED POSTPROCEDURAL STATES: Chronic | ICD-10-CM

## 2019-01-23 LAB
POCT INR: 4.1 RATIO — HIGH (ref 0.9–1.2)
POCT PT: 49.1 SEC — HIGH (ref 10–13.4)

## 2019-01-23 NOTE — ED POST DISCHARGE NOTE - REASON FOR FOLLOW-UP
Other pt was discharged the following morning on January 16. Pt c/o that after doing al of her test she felt tired and not ready to go home and requested she stays another night. Pt spoke and discussed plan with Dr. Cole and was discharged the next morning. Pt emotional and demanding to be sent home on narcotics. Patient rep called and discussion with pts PMD over phone about follow up.

## 2019-01-28 ENCOUNTER — EMERGENCY (EMERGENCY)
Facility: HOSPITAL | Age: 39
LOS: 0 days | Discharge: HOME | End: 2019-01-29
Attending: EMERGENCY MEDICINE | Admitting: EMERGENCY MEDICINE

## 2019-01-28 VITALS
DIASTOLIC BLOOD PRESSURE: 55 MMHG | HEART RATE: 79 BPM | RESPIRATION RATE: 189 BRPM | TEMPERATURE: 98 F | SYSTOLIC BLOOD PRESSURE: 106 MMHG | OXYGEN SATURATION: 97 %

## 2019-01-28 VITALS — HEIGHT: 66 IN | WEIGHT: 203.05 LBS

## 2019-01-28 DIAGNOSIS — K92.1 MELENA: ICD-10-CM

## 2019-01-28 DIAGNOSIS — R10.32 LEFT LOWER QUADRANT PAIN: ICD-10-CM

## 2019-01-28 DIAGNOSIS — R10.31 RIGHT LOWER QUADRANT PAIN: ICD-10-CM

## 2019-01-28 DIAGNOSIS — Z98.890 OTHER SPECIFIED POSTPROCEDURAL STATES: Chronic | ICD-10-CM

## 2019-01-28 DIAGNOSIS — Z79.899 OTHER LONG TERM (CURRENT) DRUG THERAPY: ICD-10-CM

## 2019-01-28 DIAGNOSIS — Z79.01 LONG TERM (CURRENT) USE OF ANTICOAGULANTS: ICD-10-CM

## 2019-01-28 DIAGNOSIS — Z91.041 RADIOGRAPHIC DYE ALLERGY STATUS: ICD-10-CM

## 2019-01-28 DIAGNOSIS — N93.9 ABNORMAL UTERINE AND VAGINAL BLEEDING, UNSPECIFIED: ICD-10-CM

## 2019-01-28 DIAGNOSIS — Z79.891 LONG TERM (CURRENT) USE OF OPIATE ANALGESIC: ICD-10-CM

## 2019-01-28 DIAGNOSIS — Z91.018 ALLERGY TO OTHER FOODS: ICD-10-CM

## 2019-01-28 RX ORDER — SODIUM CHLORIDE 9 MG/ML
1000 INJECTION, SOLUTION INTRAVENOUS ONCE
Qty: 0 | Refills: 0 | Status: COMPLETED | OUTPATIENT
Start: 2019-01-28 | End: 2019-01-28

## 2019-01-28 RX ORDER — HYDROMORPHONE HYDROCHLORIDE 2 MG/ML
0.5 INJECTION INTRAMUSCULAR; INTRAVENOUS; SUBCUTANEOUS ONCE
Qty: 0 | Refills: 0 | Status: DISCONTINUED | OUTPATIENT
Start: 2019-01-28 | End: 2019-01-28

## 2019-01-28 NOTE — ED PROVIDER NOTE - NS ED ROS FT
Review of Systems:  · CONSTITUTIONAL: no fever and no chills.  · EYES: no discharge, no irritation, no pain, no redness, and no visual changes.  · ENMT: Ears: no ear pain and no hearing problems. Nose: no nasal congestion and no nasal drainage. Mouth/Throat: no dysphagia, no hoarseness and no throat pain.  · CARDIOVASCULAR: no chest pain  · RESPIRATORY: no cough, and no shortness of breath.  · GASTROINTESTINAL: + abdominal pain, no diarrhea, no nausea and no vomiting.  + hematochezia x 1 today.  · GENITOURINARY: no dysuria, + vag spotting  · MUSCULOSKELETAL: no back pain, no musculoskeletal pain, no weakness.  · SKIN: no rashes.  · NEURO: no loss of consciousness, no headache.  · ROS STATEMENT: all other ROS negative except as per HPI

## 2019-01-28 NOTE — ED PROVIDER NOTE - NSFOLLOWUPCLINICS_GEN_ALL_ED_FT
[facility]  Medicine  [address], [place]   Phone: [phone]  Fax:   Follow Up Time:     [facility]  OB/GYN  [address] [place] [address]  Phone: [phone]  Fax:   Follow Up Time:

## 2019-01-28 NOTE — ED PROVIDER NOTE - MEDICAL DECISION MAKING DETAILS
Pt aware of imaging results.  No acute processes.  Needs to f/u with PCP and GYN.  INR 4.08 - Pt has INR check on Wed.  Advised to hold today's dose of coumadin.  Takes coumadin 10mg daily.

## 2019-01-28 NOTE — ED PROVIDER NOTE - CARE PLAN
Principal Discharge DX:	Abdominal pain  Secondary Diagnosis:	Vaginal bleeding  Secondary Diagnosis:	Hematochezia

## 2019-01-28 NOTE — ED PROVIDER NOTE - NSFOLLOWUPINSTRUCTIONS_ED_ALL_ED_FT
Please call the provider(s) above today to schedule follow up appointments.  Call the medicine clinic to initiate follow up care and to be referred to Gastroenterology.  Please review the extra literature provided to you today regarding your symptoms.    Please hold today's dose of coumadin and follow up with your scheduled INR check on Wednesday.

## 2019-01-28 NOTE — ED PROVIDER NOTE - OBJECTIVE STATEMENT
37 y/o F here with noted blood in her stool today - small specks earlier today in one BM.  None since that time noted when wiping her rectum after using the bathroom. Pt also had some vaginal spotting earlier x 3 days.  Pt also noted blood in the toliet after urinating today.  Pt also c/o lower AP.   LMP approx 10 days ago as expected.  PMH OA, RA, SLE, fibromyalgia, h/o PE on coumadin, h/o ovarian cyst, h/o tubal ectopic.  No fever. No cp, sob.  sexually active with . No concern for STD or pregnancy.

## 2019-01-28 NOTE — ED ADULT TRIAGE NOTE - CHIEF COMPLAINT QUOTE
" I've been spotting and having abdominal pain x 3 days, I have blood in my stool, I feel dizzy, chest pain and shortness of breath."

## 2019-01-28 NOTE — ED PROVIDER NOTE - PHYSICAL EXAMINATION
Physical Examination:   VITAL SIGNS: I have reviewed vital signs.  CONSTITUTIONAL: well appearing, NAD.   SKIN: Skin exam is warm and dry.  No rashes  HEAD: Normocephalic; atraumatic.  EYES: PERRL, EOM intact; conjunctiva and sclera clear.  ENT: No nasal discharge; airway clear.  NECK: Supple; non tender.  No nuchal rigidity.  CARD: S1, S2 reg  RESP: CTAB. No wheezes, rales or rhonchi.  ABD: soft; non-distended; +ttp in b/l lower quadrants.    : external exam WNL, cervical os closed.  no active bleeding, some pink noted on cotton swab for g/c testing.  + uterine and b/l adnexal ttp.  no CMT.  EXT: Normal ROM. No edema.  NEURO: Alert, oriented. Grossly unremarkable. No focal deficits. Ambulatory with steady gait - uses cane.  PSYCH: Cooperative, appropriate.

## 2019-01-29 LAB
ALBUMIN SERPL ELPH-MCNC: 4.2 G/DL — SIGNIFICANT CHANGE UP (ref 3.5–5.2)
ALP SERPL-CCNC: 42 U/L — SIGNIFICANT CHANGE UP (ref 30–115)
ALT FLD-CCNC: 10 U/L — SIGNIFICANT CHANGE UP (ref 0–41)
ANION GAP SERPL CALC-SCNC: 15 MMOL/L — HIGH (ref 7–14)
APPEARANCE UR: CLEAR — SIGNIFICANT CHANGE UP
APTT BLD: 47.6 SEC — HIGH (ref 27–39.2)
AST SERPL-CCNC: 16 U/L — SIGNIFICANT CHANGE UP (ref 0–41)
BASE EXCESS BLDV CALC-SCNC: 1.6 MMOL/L — SIGNIFICANT CHANGE UP (ref -2–2)
BASOPHILS # BLD AUTO: 0.07 K/UL — SIGNIFICANT CHANGE UP (ref 0–0.2)
BASOPHILS NFR BLD AUTO: 1 % — SIGNIFICANT CHANGE UP (ref 0–1)
BILIRUB SERPL-MCNC: <0.2 MG/DL — SIGNIFICANT CHANGE UP (ref 0.2–1.2)
BILIRUB UR-MCNC: NEGATIVE — SIGNIFICANT CHANGE UP
BUN SERPL-MCNC: 13 MG/DL — SIGNIFICANT CHANGE UP (ref 10–20)
C TRACH RRNA SPEC QL NAA+PROBE: SIGNIFICANT CHANGE UP
CA-I SERPL-SCNC: 1.21 MMOL/L — SIGNIFICANT CHANGE UP (ref 1.12–1.3)
CALCIUM SERPL-MCNC: 9.3 MG/DL — SIGNIFICANT CHANGE UP (ref 8.5–10.1)
CHLORIDE SERPL-SCNC: 100 MMOL/L — SIGNIFICANT CHANGE UP (ref 98–110)
CO2 SERPL-SCNC: 24 MMOL/L — SIGNIFICANT CHANGE UP (ref 17–32)
COLOR SPEC: YELLOW — SIGNIFICANT CHANGE UP
CREAT SERPL-MCNC: 0.7 MG/DL — SIGNIFICANT CHANGE UP (ref 0.7–1.5)
DIFF PNL FLD: NEGATIVE — SIGNIFICANT CHANGE UP
EOSINOPHIL # BLD AUTO: 0.03 K/UL — SIGNIFICANT CHANGE UP (ref 0–0.7)
EOSINOPHIL NFR BLD AUTO: 0.4 % — SIGNIFICANT CHANGE UP (ref 0–8)
EPI CELLS # UR: ABNORMAL /HPF
GAS PNL BLDV: 140 MMOL/L — SIGNIFICANT CHANGE UP (ref 136–145)
GAS PNL BLDV: SIGNIFICANT CHANGE UP
GLUCOSE SERPL-MCNC: 94 MG/DL — SIGNIFICANT CHANGE UP (ref 70–99)
GLUCOSE UR QL: NEGATIVE MG/DL — SIGNIFICANT CHANGE UP
HCO3 BLDV-SCNC: 27 MMOL/L — SIGNIFICANT CHANGE UP (ref 22–29)
HCT VFR BLD CALC: 35.2 % — LOW (ref 37–47)
HCT VFR BLDA CALC: 35.3 % — SIGNIFICANT CHANGE UP (ref 34–44)
HGB BLD CALC-MCNC: 11.5 G/DL — LOW (ref 14–18)
HGB BLD-MCNC: 11.1 G/DL — LOW (ref 12–16)
IMM GRANULOCYTES NFR BLD AUTO: 0.3 % — SIGNIFICANT CHANGE UP (ref 0.1–0.3)
INR BLD: 4.08 RATIO — HIGH (ref 0.65–1.3)
KETONES UR-MCNC: NEGATIVE — SIGNIFICANT CHANGE UP
LACTATE BLDV-MCNC: 0.8 MMOL/L — SIGNIFICANT CHANGE UP (ref 0.5–1.6)
LACTATE SERPL-SCNC: 0.8 MMOL/L — SIGNIFICANT CHANGE UP (ref 0.5–2.2)
LEUKOCYTE ESTERASE UR-ACNC: ABNORMAL
LIDOCAIN IGE QN: 27 U/L — SIGNIFICANT CHANGE UP (ref 7–60)
LYMPHOCYTES # BLD AUTO: 1.32 K/UL — SIGNIFICANT CHANGE UP (ref 1.2–3.4)
LYMPHOCYTES # BLD AUTO: 19.7 % — LOW (ref 20.5–51.1)
MCHC RBC-ENTMCNC: 28.7 PG — SIGNIFICANT CHANGE UP (ref 27–31)
MCHC RBC-ENTMCNC: 31.5 G/DL — LOW (ref 32–37)
MCV RBC AUTO: 91 FL — SIGNIFICANT CHANGE UP (ref 81–99)
MONOCYTES # BLD AUTO: 0.39 K/UL — SIGNIFICANT CHANGE UP (ref 0.1–0.6)
MONOCYTES NFR BLD AUTO: 5.8 % — SIGNIFICANT CHANGE UP (ref 1.7–9.3)
N GONORRHOEA RRNA SPEC QL NAA+PROBE: SIGNIFICANT CHANGE UP
NEUTROPHILS # BLD AUTO: 4.86 K/UL — SIGNIFICANT CHANGE UP (ref 1.4–6.5)
NEUTROPHILS NFR BLD AUTO: 72.8 % — SIGNIFICANT CHANGE UP (ref 42.2–75.2)
NITRITE UR-MCNC: NEGATIVE — SIGNIFICANT CHANGE UP
NRBC # BLD: 0 /100 WBCS — SIGNIFICANT CHANGE UP (ref 0–0)
PCO2 BLDV: 47 MMHG — SIGNIFICANT CHANGE UP (ref 41–51)
PH BLDV: 7.38 — SIGNIFICANT CHANGE UP (ref 7.26–7.43)
PH UR: 6 — SIGNIFICANT CHANGE UP (ref 5–8)
PLATELET # BLD AUTO: 273 K/UL — SIGNIFICANT CHANGE UP (ref 130–400)
PO2 BLDV: 58 MMHG — HIGH (ref 20–40)
POTASSIUM BLDV-SCNC: 4 MMOL/L — SIGNIFICANT CHANGE UP (ref 3.3–5.6)
POTASSIUM SERPL-MCNC: 4.3 MMOL/L — SIGNIFICANT CHANGE UP (ref 3.5–5)
POTASSIUM SERPL-SCNC: 4.3 MMOL/L — SIGNIFICANT CHANGE UP (ref 3.5–5)
PROT SERPL-MCNC: 6.9 G/DL — SIGNIFICANT CHANGE UP (ref 6–8)
PROT UR-MCNC: NEGATIVE MG/DL — SIGNIFICANT CHANGE UP
PROTHROM AB SERPL-ACNC: >40 SEC — HIGH (ref 9.95–12.87)
RBC # BLD: 3.87 M/UL — LOW (ref 4.2–5.4)
RBC # FLD: 14.6 % — HIGH (ref 11.5–14.5)
SAO2 % BLDV: 92 % — SIGNIFICANT CHANGE UP
SODIUM SERPL-SCNC: 139 MMOL/L — SIGNIFICANT CHANGE UP (ref 135–146)
SP GR SPEC: 1.02 — SIGNIFICANT CHANGE UP (ref 1.01–1.03)
SPECIMEN SOURCE: SIGNIFICANT CHANGE UP
UROBILINOGEN FLD QL: 1 MG/DL (ref 0.2–0.2)
WBC # BLD: 6.69 K/UL — SIGNIFICANT CHANGE UP (ref 4.8–10.8)
WBC # FLD AUTO: 6.69 K/UL — SIGNIFICANT CHANGE UP (ref 4.8–10.8)
WBC UR QL: ABNORMAL /HPF

## 2019-01-29 RX ORDER — ALPRAZOLAM 0.25 MG
0.5 TABLET ORAL ONCE
Qty: 0 | Refills: 0 | Status: DISCONTINUED | OUTPATIENT
Start: 2019-01-29 | End: 2019-01-29

## 2019-01-29 RX ORDER — OXYCODONE AND ACETAMINOPHEN 5; 325 MG/1; MG/1
2 TABLET ORAL ONCE
Qty: 0 | Refills: 0 | Status: DISCONTINUED | OUTPATIENT
Start: 2019-01-29 | End: 2019-01-29

## 2019-01-29 RX ORDER — OXYCODONE HYDROCHLORIDE 5 MG/1
10 TABLET ORAL ONCE
Qty: 0 | Refills: 0 | Status: DISCONTINUED | OUTPATIENT
Start: 2019-01-29 | End: 2019-01-29

## 2019-01-29 RX ADMIN — HYDROMORPHONE HYDROCHLORIDE 0.5 MILLIGRAM(S): 2 INJECTION INTRAMUSCULAR; INTRAVENOUS; SUBCUTANEOUS at 00:06

## 2019-01-29 RX ADMIN — OXYCODONE AND ACETAMINOPHEN 2 TABLET(S): 5; 325 TABLET ORAL at 01:55

## 2019-01-29 RX ADMIN — SODIUM CHLORIDE 1000 MILLILITER(S): 9 INJECTION, SOLUTION INTRAVENOUS at 00:06

## 2019-01-29 RX ADMIN — OXYCODONE HYDROCHLORIDE 10 MILLIGRAM(S): 5 TABLET ORAL at 03:50

## 2019-01-29 RX ADMIN — Medication 0.5 MILLIGRAM(S): at 01:55

## 2019-01-30 ENCOUNTER — OUTPATIENT (OUTPATIENT)
Dept: OUTPATIENT SERVICES | Facility: HOSPITAL | Age: 39
LOS: 1 days | Discharge: HOME | End: 2019-01-30

## 2019-01-30 DIAGNOSIS — Z98.890 OTHER SPECIFIED POSTPROCEDURAL STATES: Chronic | ICD-10-CM

## 2019-01-30 DIAGNOSIS — Z79.01 LONG TERM (CURRENT) USE OF ANTICOAGULANTS: ICD-10-CM

## 2019-01-30 DIAGNOSIS — I27.82 CHRONIC PULMONARY EMBOLISM: ICD-10-CM

## 2019-01-30 LAB
POCT INR: 3.8 RATIO — HIGH (ref 0.9–1.2)
POCT PT: 45.9 SEC — HIGH (ref 10–13.4)

## 2019-02-06 ENCOUNTER — OUTPATIENT (OUTPATIENT)
Dept: OUTPATIENT SERVICES | Facility: HOSPITAL | Age: 39
LOS: 1 days | Discharge: HOME | End: 2019-02-06

## 2019-02-06 DIAGNOSIS — Z98.890 OTHER SPECIFIED POSTPROCEDURAL STATES: Chronic | ICD-10-CM

## 2019-02-06 DIAGNOSIS — I27.82 CHRONIC PULMONARY EMBOLISM: ICD-10-CM

## 2019-02-06 DIAGNOSIS — Z79.01 LONG TERM (CURRENT) USE OF ANTICOAGULANTS: ICD-10-CM

## 2019-02-06 LAB
POCT INR: 2.2 RATIO — HIGH (ref 0.9–1.2)
POCT PT: 26.5 SEC — HIGH (ref 10–13.4)

## 2019-02-15 ENCOUNTER — OUTPATIENT (OUTPATIENT)
Dept: OUTPATIENT SERVICES | Facility: HOSPITAL | Age: 39
LOS: 1 days | Discharge: HOME | End: 2019-02-15

## 2019-02-15 DIAGNOSIS — Z98.890 OTHER SPECIFIED POSTPROCEDURAL STATES: Chronic | ICD-10-CM

## 2019-02-15 DIAGNOSIS — Z79.01 LONG TERM (CURRENT) USE OF ANTICOAGULANTS: ICD-10-CM

## 2019-02-15 DIAGNOSIS — I27.82 CHRONIC PULMONARY EMBOLISM: ICD-10-CM

## 2019-02-15 LAB
POCT INR: 2.4 RATIO — HIGH (ref 0.9–1.2)
POCT PT: 28.8 SEC — HIGH (ref 10–13.4)

## 2019-03-01 ENCOUNTER — OUTPATIENT (OUTPATIENT)
Dept: OUTPATIENT SERVICES | Facility: HOSPITAL | Age: 39
LOS: 1 days | Discharge: HOME | End: 2019-03-01

## 2019-03-01 DIAGNOSIS — Z98.890 OTHER SPECIFIED POSTPROCEDURAL STATES: Chronic | ICD-10-CM

## 2019-03-01 DIAGNOSIS — Z79.01 LONG TERM (CURRENT) USE OF ANTICOAGULANTS: ICD-10-CM

## 2019-03-01 DIAGNOSIS — I27.82 CHRONIC PULMONARY EMBOLISM: ICD-10-CM

## 2019-03-01 LAB
POCT INR: 2.3 RATIO — HIGH (ref 0.9–1.2)
POCT PT: 27.9 SEC — HIGH (ref 10–13.4)

## 2019-03-20 ENCOUNTER — OUTPATIENT (OUTPATIENT)
Dept: OUTPATIENT SERVICES | Facility: HOSPITAL | Age: 39
LOS: 1 days | Discharge: HOME | End: 2019-03-20

## 2019-03-20 DIAGNOSIS — Z98.890 OTHER SPECIFIED POSTPROCEDURAL STATES: Chronic | ICD-10-CM

## 2019-03-20 DIAGNOSIS — I27.82 CHRONIC PULMONARY EMBOLISM: ICD-10-CM

## 2019-03-20 DIAGNOSIS — Z79.01 LONG TERM (CURRENT) USE OF ANTICOAGULANTS: ICD-10-CM

## 2019-03-20 LAB
POCT INR: 2.1 RATIO — HIGH (ref 0.9–1.2)
POCT PT: 25.3 SEC — HIGH (ref 10–13.4)

## 2019-04-18 ENCOUNTER — OUTPATIENT (OUTPATIENT)
Dept: OUTPATIENT SERVICES | Facility: HOSPITAL | Age: 39
LOS: 1 days | Discharge: HOME | End: 2019-04-18

## 2019-04-18 DIAGNOSIS — Z98.890 OTHER SPECIFIED POSTPROCEDURAL STATES: Chronic | ICD-10-CM

## 2019-04-19 ENCOUNTER — OUTPATIENT (OUTPATIENT)
Dept: OUTPATIENT SERVICES | Facility: HOSPITAL | Age: 39
LOS: 1 days | Discharge: HOME | End: 2019-04-19

## 2019-04-19 DIAGNOSIS — Z98.890 OTHER SPECIFIED POSTPROCEDURAL STATES: Chronic | ICD-10-CM

## 2019-04-19 DIAGNOSIS — Z79.01 LONG TERM (CURRENT) USE OF ANTICOAGULANTS: ICD-10-CM

## 2019-04-19 DIAGNOSIS — F41.1 GENERALIZED ANXIETY DISORDER: ICD-10-CM

## 2019-04-19 DIAGNOSIS — I27.82 CHRONIC PULMONARY EMBOLISM: ICD-10-CM

## 2019-04-19 LAB
POCT INR: 1.4 RATIO — HIGH (ref 0.9–1.2)
POCT PT: 16.3 SEC — HIGH (ref 10–13.4)

## 2019-04-30 ENCOUNTER — OUTPATIENT (OUTPATIENT)
Dept: OUTPATIENT SERVICES | Facility: HOSPITAL | Age: 39
LOS: 1 days | Discharge: HOME | End: 2019-04-30

## 2019-04-30 DIAGNOSIS — Z79.01 LONG TERM (CURRENT) USE OF ANTICOAGULANTS: ICD-10-CM

## 2019-04-30 DIAGNOSIS — Z98.890 OTHER SPECIFIED POSTPROCEDURAL STATES: Chronic | ICD-10-CM

## 2019-04-30 DIAGNOSIS — I27.82 CHRONIC PULMONARY EMBOLISM: ICD-10-CM

## 2019-04-30 LAB
POCT INR: 1.4 RATIO — HIGH (ref 0.9–1.2)
POCT PT: 17 SEC — HIGH (ref 10–13.4)

## 2019-05-01 ENCOUNTER — OUTPATIENT (OUTPATIENT)
Dept: OUTPATIENT SERVICES | Facility: HOSPITAL | Age: 39
LOS: 1 days | Discharge: HOME | End: 2019-05-01

## 2019-05-01 DIAGNOSIS — Z98.890 OTHER SPECIFIED POSTPROCEDURAL STATES: Chronic | ICD-10-CM

## 2019-05-01 DIAGNOSIS — F41.1 GENERALIZED ANXIETY DISORDER: ICD-10-CM

## 2019-05-24 ENCOUNTER — OUTPATIENT (OUTPATIENT)
Dept: OUTPATIENT SERVICES | Facility: HOSPITAL | Age: 39
LOS: 1 days | Discharge: HOME | End: 2019-05-24

## 2019-05-24 DIAGNOSIS — Z98.890 OTHER SPECIFIED POSTPROCEDURAL STATES: Chronic | ICD-10-CM

## 2019-05-24 DIAGNOSIS — Z79.01 LONG TERM (CURRENT) USE OF ANTICOAGULANTS: ICD-10-CM

## 2019-05-24 DIAGNOSIS — I27.82 CHRONIC PULMONARY EMBOLISM: ICD-10-CM

## 2019-05-24 LAB
POCT INR: 1.9 RATIO — HIGH (ref 0.9–1.2)
POCT PT: 22.9 SEC — HIGH (ref 10–13.4)

## 2019-06-04 ENCOUNTER — OUTPATIENT (OUTPATIENT)
Dept: OUTPATIENT SERVICES | Facility: HOSPITAL | Age: 39
LOS: 1 days | Discharge: HOME | End: 2019-06-04

## 2019-06-04 DIAGNOSIS — Z98.890 OTHER SPECIFIED POSTPROCEDURAL STATES: Chronic | ICD-10-CM

## 2019-06-04 DIAGNOSIS — I27.82 CHRONIC PULMONARY EMBOLISM: ICD-10-CM

## 2019-06-04 DIAGNOSIS — Z79.01 LONG TERM (CURRENT) USE OF ANTICOAGULANTS: ICD-10-CM

## 2019-06-04 LAB
POCT INR: 1.2 RATIO — SIGNIFICANT CHANGE UP (ref 0.9–1.2)
POCT PT: 14.3 SEC — HIGH (ref 10–13.4)

## 2019-06-06 NOTE — ED ADULT NURSE NOTE - CAS DISCH ACCOMP BY
Follow up local wound care. Pt assessed for pain prior to assessment. Wound reassessed and noted improvement in wound condition.Left buttock, partial thickness 2.0x1.5x0.1cm, 100% red tissue, scant drainage.  No clinical s/s of infection noted at this time of assessment. Right buttock noted with intact skin.  Rec: continue current plan of care. Will cont.  to follow    Allergies (1) Active Reaction  ampicillin None Documented  Medications (22) Active  Scheduled: (16)  *Do NOT give IM Injections  1 each, N/A, Daily  *Do NOT give IM Injections  1 each, N/A, Daily  AmLODIPine 5 mg tab  5 mg 1 tab, Oral, Daily  ApixaBAN 5 mg tab  10 mg 2 tab, Oral, Q12H  ApixaBAN 5 mg tab  5 mg 1 tab, Oral, Q12H  Aspirin 81 mg chew tab  81 mg 1 tab, Oral, Daily  Atorvastatin 40 mg tab  40 mg 1 tab, Oral, Q Bedtime  Colchicine 0.6 mg tab  0.6 mg 1 tab, Oral, Daily  Docusate sodium 100 mg cap  100 mg 1 cap, Oral, Q Bedtime  DULoxetine 60 mg DR cap  60 mg 1 cap, Oral, Daily  Ergocalciferol 50,000 unit (1.25 mg) cap  50,000 unit 1 cap, Oral, Q Wednesday  Ferrous sulfate 325 mg tab [Fe 65 mg]  325 mg 1 tab, Oral, Daily  Lidocaine 5% (50 mg/gm) patch  2 patch, TransDermal, Daily  lidocaine topical patch removal`  Remove Patch, TransDermal, Q Bedtime  Metoprolol tartrate 25 mg tab  25 mg 1 tab, Oral, Q12H  Pantoprazole 40 mg DR tab  40 mg 1 tab, Oral, BID  Continuous: (1)  nitroGLYCERIN 50 mg [20 mcg/min] + premixed in Dextrose 5% 250 mL  250 mL, IV, 6 mL/hr  PRN: (5)  Acetaminophen 325 mg tab  650 mg 2 tab, Oral, Q4H  Albuterol HFA 90 mcg oral MDI 8 gm  180 mcg 2 puff, MDI/DPI, Q6H  Aluminum-magnesium hydrox-simethicone SS 30 mL oral susp UD  30 mL, Oral, Q6H  Sodium chloride PF 0.9% flush inj 10 mL  2 mL, Flush, As Directed PRN  TraMADol 50 mg tab  50 mg 1 tab, Oral, Q4H    Labs between:  05-JUN-2019 11:47 to 06-JUN-2019 11:47    CBC:                 WBC  HgB  Hct  Plt  MCV  RDW   06-JUN-2019 (L) 3.7  (L) 7.3  (L) 24.3  (L) 86  99.2  (H)  21.1     Other Chem:             Mg  Phos  Triglycerides  GGTP  DirectBili   Spouse

## 2019-06-18 ENCOUNTER — OUTPATIENT (OUTPATIENT)
Dept: OUTPATIENT SERVICES | Facility: HOSPITAL | Age: 39
LOS: 1 days | Discharge: HOME | End: 2019-06-18

## 2019-06-18 DIAGNOSIS — I27.82 CHRONIC PULMONARY EMBOLISM: ICD-10-CM

## 2019-06-18 DIAGNOSIS — Z98.890 OTHER SPECIFIED POSTPROCEDURAL STATES: Chronic | ICD-10-CM

## 2019-06-18 DIAGNOSIS — Z79.01 LONG TERM (CURRENT) USE OF ANTICOAGULANTS: ICD-10-CM

## 2019-06-18 LAB
POCT INR: 2.9 RATIO — HIGH (ref 0.9–1.2)
POCT PT: 34.8 SEC — HIGH (ref 10–13.4)

## 2019-06-25 ENCOUNTER — OUTPATIENT (OUTPATIENT)
Dept: OUTPATIENT SERVICES | Facility: HOSPITAL | Age: 39
LOS: 1 days | Discharge: HOME | End: 2019-06-25

## 2019-06-25 DIAGNOSIS — I27.82 CHRONIC PULMONARY EMBOLISM: ICD-10-CM

## 2019-06-25 DIAGNOSIS — Z98.890 OTHER SPECIFIED POSTPROCEDURAL STATES: Chronic | ICD-10-CM

## 2019-06-25 DIAGNOSIS — Z79.01 LONG TERM (CURRENT) USE OF ANTICOAGULANTS: ICD-10-CM

## 2019-06-25 LAB
POCT INR: 2.9 RATIO — HIGH (ref 0.9–1.2)
POCT PT: 34.7 SEC — HIGH (ref 10–13.4)

## 2019-07-09 NOTE — ED PROVIDER NOTE - PROGRESS NOTE DETAILS
Addended by: MIKEL LYON on: 7/9/2019 12:30 PM     Modules accepted: Orders     Pt states that hs had no allergy to IV contrast and has had it before for a CT chest. Pt states that she had no allergy to IV contrast and has had it before for a CT chest. Pt requesting her home meds - percocet 10mg and xanax 0.5mg. Pt requesting her home meds - percocet 10/325mg and xanax 0.5mg. Pt requesting pain meds.  Will give another oral dose of her home med dosing without acetaminophen -10mg oxycodone IR. pt requesting pain meds for home for her AP, but aware that her bloodwork and imaging are unremarkable.  Pt takes percocet 10/325mg at home - recommend continuing that for pain control. pt aware of endometrial changes and should f/u with GYN.  Pt also should f/u with GI.

## 2019-07-29 ENCOUNTER — OUTPATIENT (OUTPATIENT)
Dept: OUTPATIENT SERVICES | Facility: HOSPITAL | Age: 39
LOS: 1 days | Discharge: HOME | End: 2019-07-29

## 2019-07-29 DIAGNOSIS — Z98.890 OTHER SPECIFIED POSTPROCEDURAL STATES: Chronic | ICD-10-CM

## 2019-07-29 DIAGNOSIS — I27.82 CHRONIC PULMONARY EMBOLISM: ICD-10-CM

## 2019-07-29 DIAGNOSIS — Z79.01 LONG TERM (CURRENT) USE OF ANTICOAGULANTS: ICD-10-CM

## 2019-07-29 LAB
POCT INR: 1.1 RATIO — SIGNIFICANT CHANGE UP (ref 0.9–1.2)
POCT PT: 13.4 SEC — SIGNIFICANT CHANGE UP (ref 10–13.4)

## 2019-08-06 ENCOUNTER — OUTPATIENT (OUTPATIENT)
Dept: OUTPATIENT SERVICES | Facility: HOSPITAL | Age: 39
LOS: 1 days | Discharge: HOME | End: 2019-08-06

## 2019-08-06 DIAGNOSIS — I27.82 CHRONIC PULMONARY EMBOLISM: ICD-10-CM

## 2019-08-06 DIAGNOSIS — Z98.890 OTHER SPECIFIED POSTPROCEDURAL STATES: Chronic | ICD-10-CM

## 2019-08-06 DIAGNOSIS — Z79.01 LONG TERM (CURRENT) USE OF ANTICOAGULANTS: ICD-10-CM

## 2019-08-19 ENCOUNTER — OUTPATIENT (OUTPATIENT)
Dept: OUTPATIENT SERVICES | Facility: HOSPITAL | Age: 39
LOS: 1 days | Discharge: HOME | End: 2019-08-19

## 2019-08-19 DIAGNOSIS — Z79.01 LONG TERM (CURRENT) USE OF ANTICOAGULANTS: ICD-10-CM

## 2019-08-19 DIAGNOSIS — I27.82 CHRONIC PULMONARY EMBOLISM: ICD-10-CM

## 2019-08-19 DIAGNOSIS — Z98.890 OTHER SPECIFIED POSTPROCEDURAL STATES: Chronic | ICD-10-CM

## 2019-08-19 LAB
POCT INR: 1.4 RATIO — HIGH (ref 0.9–1.2)
POCT PT: 16.8 SEC — HIGH (ref 10–13.4)

## 2019-09-13 ENCOUNTER — OUTPATIENT (OUTPATIENT)
Dept: OUTPATIENT SERVICES | Facility: HOSPITAL | Age: 39
LOS: 1 days | Discharge: HOME | End: 2019-09-13

## 2019-09-13 DIAGNOSIS — Z79.01 LONG TERM (CURRENT) USE OF ANTICOAGULANTS: ICD-10-CM

## 2019-09-13 DIAGNOSIS — Z98.890 OTHER SPECIFIED POSTPROCEDURAL STATES: Chronic | ICD-10-CM

## 2019-09-13 DIAGNOSIS — I27.82 CHRONIC PULMONARY EMBOLISM: ICD-10-CM

## 2019-09-13 LAB
POCT INR: 2.2 RATIO — HIGH (ref 0.9–1.2)
POCT PT: 26.4 SEC — HIGH (ref 10–13.4)

## 2019-10-17 ENCOUNTER — APPOINTMENT (OUTPATIENT)
Dept: OBGYN | Facility: CLINIC | Age: 39
End: 2019-10-17
Payer: MEDICARE

## 2019-10-17 ENCOUNTER — RESULT CHARGE (OUTPATIENT)
Age: 39
End: 2019-10-17

## 2019-10-17 ENCOUNTER — OUTPATIENT (OUTPATIENT)
Dept: OUTPATIENT SERVICES | Facility: HOSPITAL | Age: 39
LOS: 1 days | Discharge: HOME | End: 2019-10-17

## 2019-10-17 VITALS
WEIGHT: 214 LBS | HEIGHT: 67 IN | BODY MASS INDEX: 33.59 KG/M2 | DIASTOLIC BLOOD PRESSURE: 60 MMHG | SYSTOLIC BLOOD PRESSURE: 110 MMHG

## 2019-10-17 DIAGNOSIS — M06.9 RHEUMATOID ARTHRITIS, UNSPECIFIED: ICD-10-CM

## 2019-10-17 DIAGNOSIS — Z87.39 PERSONAL HISTORY OF OTHER DISEASES OF THE MUSCULOSKELETAL SYSTEM AND CONNECTIVE TISSUE: ICD-10-CM

## 2019-10-17 DIAGNOSIS — F17.200 NICOTINE DEPENDENCE, UNSPECIFIED, UNCOMPLICATED: ICD-10-CM

## 2019-10-17 DIAGNOSIS — M32.9 SYSTEMIC LUPUS ERYTHEMATOSUS, UNSPECIFIED: ICD-10-CM

## 2019-10-17 DIAGNOSIS — Z87.19 PERSONAL HISTORY OF OTHER DISEASES OF THE DIGESTIVE SYSTEM: ICD-10-CM

## 2019-10-17 DIAGNOSIS — Z87.59 PERSONAL HISTORY OF OTHER COMPLICATIONS OF PREGNANCY, CHILDBIRTH AND THE PUERPERIUM: ICD-10-CM

## 2019-10-17 DIAGNOSIS — Z82.49 FAMILY HISTORY OF ISCHEMIC HEART DISEASE AND OTHER DISEASES OF THE CIRCULATORY SYSTEM: ICD-10-CM

## 2019-10-17 DIAGNOSIS — N96 RECURRENT PREGNANCY LOSS: ICD-10-CM

## 2019-10-17 DIAGNOSIS — Z86.711 PERSONAL HISTORY OF PULMONARY EMBOLISM: ICD-10-CM

## 2019-10-17 DIAGNOSIS — Z98.890 OTHER SPECIFIED POSTPROCEDURAL STATES: Chronic | ICD-10-CM

## 2019-10-17 LAB
HCG UR QL: NEGATIVE
QUALITY CONTROL: YES

## 2019-10-17 PROCEDURE — 99203 OFFICE O/P NEW LOW 30 MIN: CPT

## 2019-10-17 RX ORDER — WARFARIN SODIUM 6 MG/1
TABLET ORAL
Refills: 0 | Status: ACTIVE | COMMUNITY

## 2019-10-18 DIAGNOSIS — N96 RECURRENT PREGNANCY LOSS: ICD-10-CM

## 2019-10-21 ENCOUNTER — MESSAGE (OUTPATIENT)
Age: 39
End: 2019-10-21

## 2019-10-22 ENCOUNTER — MESSAGE (OUTPATIENT)
Age: 39
End: 2019-10-22

## 2019-10-25 ENCOUNTER — MESSAGE (OUTPATIENT)
Age: 39
End: 2019-10-25

## 2021-02-18 NOTE — PATIENT PROFILE ADULT - ...
Problem: Anger Management/Homicidal Ideation:  Goal: Absence of violence  Outcome: Ongoing  Note: Patient was absence of violence   Goal: Absence of angry outbursts  Description: Absence of angry outbursts  Outcome: Ongoing  Note: Pt absence of angry outbursts     Problem: Altered Mood, Depressive Behavior:  Goal: Participates in care planning  Description: Participates in care planning  Outcome: Ongoing  Note: Patient did not participate in care planning   Goal: No signs of physiological stress  Description: No signs of physiological stress  Outcome: Ongoing  Note: Patient shows no signs of physiological stress   Goal: STG-Able to verbalize suicidal ideations  Outcome: Ongoing     Problem: Intellectual/Education/Knowledge Deficit  Goal: Highest potential functional level  Outcome: Ongoing  Note: Patient functioning at highest potential level      Problem: Discharge Planning:  Goal: Discharged to appropriate level of care  Description: Discharged to appropriate level of care  Outcome: Ongoing  Note: Discharge planner working with patient to achieve optimal discharge plan, specific to the needs of this patient.        Problem: General Medical Problem-Behavioral Health  Goal: Compliance with prescribed medication regimen will improve  Description: Compliance with prescribed medication regimen will improve  Outcome: Ongoing  Note: Patient was compliant with medication regimen      Problem: Pain:  Goal: Pain level will decrease  Description: Pain level will decrease  Outcome: Ongoing  Note: Patient pain level reported @0  Goal: Able to cope with pain  Description: Able to cope with pain  Outcome: Ongoing  Note: Patient able to cope with pain reports pain at 0     Problem: Anxiety:  Goal: Level of anxiety will decrease  Description: Level of anxiety will decrease  Outcome: Ongoing  Note: Patient level  of anxiety decreased      Problem: Role Relationship: Goal: Ability to demonstrate positive changes in social behaviors and relationships will improve  Description: Ability to demonstrate positive changes in social behaviors and relationships will improve  2/17/2021 1930 by Can Hernandes LPN  Outcome: Ongoing  Note: Patient unable to demonstrate positive changes in social behaviors and relationships will improve   2/17/2021 1550 by Christin Mccoramck  Outcome: Not Met This Shift     Problem: Altered Mood, Psychotic Behavior:  Goal: Able to verbalize reality based thinking  Description: Able to verbalize reality based thinking  Outcome: Ongoing  Note: Patient unable to verbalize reality based thinking    Care plan reviewed with patient and . Patient and  verbalize understanding of the plan of care and contribute to goal setting. 28-Nov-2018 06:19:53

## 2021-12-28 NOTE — PROGRESS NOTE ADULT - SUBJECTIVE AND OBJECTIVE BOX
Patient was seen and examined. Spoke with RN. Chart reviewed.  No events overnight.- mild URI symptoms; no fever  Vital Signs Last 24 Hrs  T(F): 97.2 (30 Nov 2018 05:45), Max: 97.8 (29 Nov 2018 14:15)  HR: 60 (30 Nov 2018 05:45) (59 - 65)  BP: 103/52 (30 Nov 2018 05:45) (103/52 - 133/73)  SpO2: 99% (30 Nov 2018 02:29) (99% - 99%)  MEDICATIONS  (STANDING):  docusate sodium 100 milliGRAM(s) Oral three times a day  heparin  Infusion 1600 Unit(s)/Hr (16 mL/Hr) IV Continuous <Continuous>  hydroxychloroquine 200 milliGRAM(s) Oral daily  predniSONE   Tablet 20 milliGRAM(s) Oral daily    MEDICATIONS  (PRN):  ALPRAZolam 0.5 milliGRAM(s) Oral every 6 hours PRN anxiety  benzocaine 15 mG/menthol 3.6 mG Lozenge 1 Lozenge Oral every 4 hours PRN Sore Throat  oxyCODONE    5 mG/acetaminophen 325 mG 2 Tablet(s) Oral every 4 hours PRN Moderate Pain (4 - 6)  senna 2 Tablet(s) Oral at bedtime PRN Constipation  zolpidem 5 milliGRAM(s) Oral at bedtime PRN Insomnia    Labs:                        11.1   8.26  )-----------( 288      ( 30 Nov 2018 06:57 )             34.6                         11.2   4.63  )-----------( 258      ( 29 Nov 2018 08:44 )             34.4     30 Nov 2018 06:57    139    |  103    |  8      ----------------------------<  86     3.6     |  25     |  0.7    29 Nov 2018 08:44    138    |  101    |  10     ----------------------------<  84     4.2     |  23     |  0.7      Ca    8.5        30 Nov 2018 06:57  Ca    8.4        29 Nov 2018 08:44  Mg     1.7       30 Nov 2018 06:57  Mg     1.7       29 Nov 2018 08:44      PT/INR - ( 30 Nov 2018 06:57 )   PT: 19.30 sec;   INR: 1.69 ratio         PTT - ( 30 Nov 2018 06:57 )  PTT:>200.0 sec      General: comfortable, NAD  Neurology: A&Ox3, nonfocal  Head:  Normocephalic, atraumatic  ENT:  Mucosa moist, no ulcerations  Neck:  Supple, no JVD,   Skin: no breakdowns (as per RN)  Resp: CTA B/L  CV: RRR, S1S2,   GI: Soft, NT, bowel sounds  MS: No edema, + peripheral pulses, FROM all 4 extremity      A/P:  38 y o f with pmh of SLE , RA on prednisone , hydroxychloroquine , PE on coumadin , gastritis , fibromyalgia and insomnia presented to ER with c/o 1 wk h/o weakness , myalgias and 1 day h/o chest pain and left shoulder pain .     # CHILLS / MYALGIAS / LIKELY VIRAL ETIOLOGY DOUBT FLU AS SYMPTOMS HAVE RESOLVED / RA NOT TAKING PREDNISONE   - prednisone taper  - CTA negative for PE, CXR wnl  - supportive care.  - will continue with plaquenil .  - PT /OT eval.  rapid flu is negative    # SLE FLARE (unlikely):  - ESR, CRP, C3, C4, RF all wnl  - ALLA 1:80, speckled  - will c/w plaquenil and steroid taper     coumadin- no need for bridging as thrombotic events not within 6 months    DC home today    Pt aware of need for frequent INR monitoring    .DVT prophylaxis  Decubitus prevention- all measures as per RN protocol  Please call or text me with any questions or updates Griseofulvin Pregnancy And Lactation Text: This medication is Pregnancy Category X and is known to cause serious birth defects. It is unknown if this medication is excreted in breast milk but breast feeding should be avoided.

## 2023-07-20 NOTE — ED ADULT NURSE NOTE - CHIEF COMPLAINT
ASSESSMENT/PLAN                                                      (Z02.6) Encounter related to worker's compensation claim  (primary encounter diagnosis)  Comment: see below for details.    (M54.12) Cervical radiculopathy  Comment: resolved.    (M25.512,  G89.29) Chronic left shoulder pain  (M75.32) Calcific tendonitis of left shoulder  Comment: pain and range of motion have significantly improved with physical therapy, ultrasound guided barbotage, and steroid injection; continued pain and decreased range of motion when reaching behind her back.  Plan: recommend continued physical therapy and sports medicine follow-up (the latter is scheduled for later today).    Katherine Zamora MD   [address] [address]  T: [phone], F: [phone]    SUBJECTIVE                                                      Lena Morrow is a very pleasant 46 year old female who presents for work comp follow-up:    Work comp incident occurred 11/17/2022. Patient initially suffered from left neck pain and, later on, left shoulder pain. She has been attending physical therapy regularly and has met with both neurosurgery and sports medicine for further evaluation of her symptoms. She has been diagnosed with calcific tendinitis of her left shoulder and underwent ultrasound-guided barbotage and steroid injection 6/8/2023.    Patient reports that her left neck pain has resolved. She continues to have left shoulder pain, but it has significantly improved since last visit.  Her left shoulder range of motion has also significantly improved since last visit.  She continues to have difficulty and pain when reaching behind her back but is otherwise doing well with respect to her left shoulder and it's range of motion. She would benefit from continued physical therapy to address these continued symptoms.    OBJECTIVE                                                      /75   Pulse 67   Temp  97.2  F (36.2  C) (Tympanic)   Wt 96.3 kg (212 lb 3.2 oz)   LMP 12/10/2015   SpO2 98%   BMI 36.42 kg/m    Constitutional: well-appearing  Psych: normal judgment and insight; normal mood and affect; recent and remote memory intact    ---    (Note documentation was completed, in part, with [employer] voice-recognition software. Documentation was reviewed, but some grammatical, spelling, and word errors may remain.)     The patient is a 38y Female complaining of multiple medical complaints.

## 2024-08-25 NOTE — PROGRESS NOTE ADULT - ASSESSMENT
"Encounter Date: 8/25/2024       History     Chief Complaint   Patient presents with    Leg Pain     Mother stated that pt has been experiencing bilateral leg pain for the past year. Has been seen by pediatrician and told was "growing pains" - seen again this week. Presents to ED with concerns for pain. No injury / trauma reported. Ambulatory upon arrival.      This is a 7-year-old black male with noncontributory past medical history who presents to the emergency department with his mother with concerns regarding headache and leg pain.  Patient was evaluated by his pediatrician earlier this week for leg pain intermittently over the last year.  He was instructed to take Tylenol and Motrin and advised that symptoms were likely secondary to "growing pains".  Patient is brought to ED because he is having leg pains this afternoon despite having received 1 dose of ibuprofen about 8 hours ago.  He reports that pain is constant and located in posterior thigh regions.  He also reports generalized headache.  He denies URI signs and symptoms.  No vomiting or diarrhea.  Patient is in no acute distress, playing video game.      Review of patient's allergies indicates:  No Known Allergies  History reviewed. No pertinent past medical history.  Past Surgical History:   Procedure Laterality Date    CIRCUMCISION       Family History   Problem Relation Name Age of Onset    Eczema Mother      Eczema Brother       Social History     Tobacco Use    Smoking status: Never     Passive exposure: Current    Smokeless tobacco: Never   Substance Use Topics    Alcohol use: No     Review of Systems   Constitutional:  Negative for appetite change and fever.   HENT:  Negative for congestion.    Respiratory:  Negative for cough.    Musculoskeletal:  Positive for myalgias.   Neurological:  Positive for headaches.       Physical Exam     Initial Vitals [08/25/24 1808]   BP Pulse Resp Temp SpO2   (!) 138/82 85 18 98.2 °F (36.8 °C) 100 %      MAP     "   --         Physical Exam    Nursing note and vitals reviewed.  Constitutional: He appears well-developed and well-nourished.   HENT:   Head: Normocephalic and atraumatic.   Mouth/Throat: No tonsillar exudate. Pharynx is normal.   Eyes: EOM are normal.   Neck:    Full passive range of motion without pain.     Cardiovascular:  Regular rhythm, S1 normal and S2 normal.        Pulses are strong and palpable.    Pulmonary/Chest: Effort normal and breath sounds normal. No respiratory distress.   Musculoskeletal:         General: Normal range of motion.      Cervical back: Full passive range of motion without pain.     Neurological: He is alert. GCS score is 15. GCS eye subscore is 4. GCS verbal subscore is 5. GCS motor subscore is 6.   Skin: Skin is warm and dry. Capillary refill takes less than 2 seconds.         ED Course   Procedures  Labs Reviewed   SARS-COV-2 RDRP GENE       Result Value    POC Rapid COVID Negative       Acceptable Yes      Narrative:     This test utilizes isothermal nucleic acid amplification technology to detect the SARS-CoV-2 RdRp nucleic acid segment. The analytical sensitivity (limit of detection) is 500 copies/swab.     A POSITIVE result is indicative of the presence of SARS-CoV-2 RNA; clinical correlation with patient history and other diagnostic information is necessary to determine patient infection status.    A NEGATIVE result means that SARS-CoV-2 nucleic acids are not present above the limit of detection. A NEGATIVE result should be treated as presumptive. It does not rule out the possibility of COVID-19 and should not be the sole basis for treatment decisions. If COVID-19 is strongly suspected based on clinical and exposure history, re-testing using an alternate molecular assay should be considered.     Commercial kits are provided by [employer].   _________________________________________________________________   The authorized Fact Sheet for Healthcare  SUBJECTIVE:    Patient is a 38y old  Female who presents with a chief complaint of Chest pain , weakness (28 Nov 2018 12:57)    Currently admitted to medicine with the primary diagnosis of SLE exacerbation     Today is hospital day 1d. This morning she is resting comfortably in bed and reports no new issues or overnight events. Still having generalized body pain but improved. No longer having chest pain.    PAST MEDICAL & SURGICAL HISTORY  PAST MEDICAL & SURGICAL HISTORY:  Rheumatoid arthritis  Gastritis  Fibromyalgia  Pulmonary embolism  Lupus (systemic lupus erythematosus)  H/O abdominal surgery    SOCIAL HISTORY:    ALLERGIES:  &quot;cold plasma&quot; (Hives)  acetaminophen (Hives)  IV Contrast (Hives)  Tomatoes (Hives)    MEDICATIONS:  STANDING MEDICATIONS  docusate sodium 100 milliGRAM(s) Oral three times a day  hydroxychloroquine 200 milliGRAM(s) Oral daily  predniSONE   Tablet 20 milliGRAM(s) Oral daily  rivaroxaban 10 milliGRAM(s) Oral every 24 hours    PRN MEDICATIONS  ALPRAZolam 0.5 milliGRAM(s) Oral every 6 hours PRN  oxyCODONE    5 mG/acetaminophen 325 mG 2 Tablet(s) Oral every 4 hours PRN  senna 2 Tablet(s) Oral at bedtime PRN  zolpidem 5 milliGRAM(s) Oral at bedtime PRN    VITALS:   T(F): 97.3  HR: 65  BP: 140/92  RR: 20  SpO2: 97%    LABS:                        11.9   5.44  )-----------( 271      ( 28 Nov 2018 07:33 )             36.8     11-28    137  |  103  |  8<L>  ----------------------------<  114<H>  4.4   |  20  |  0.6<L>    Ca    8.5      28 Nov 2018 07:33  Mg     1.9     11-27    TPro  6.3  /  Alb  3.5  /  TBili  0.3  /  DBili  x   /  AST  22  /  ALT  10  /  AlkPhos  51  11-27    PT/INR - ( 28 Nov 2018 07:33 )   PT: 13.50 sec;   INR: 1.18 ratio               Sedimentation Rate, Erythrocyte: 11 mm/hr (11-28-18 @ 12:53)      CARDIAC MARKERS ( 27 Nov 2018 13:46 )  x     / <0.01 ng/mL / x     / x     / x          RADIOLOGY:    PHYSICAL EXAM:  GEN: No acute distress  HEENT: NCAT  LUNGS: Clear to auscultation bilaterally   HEART: S1/S2 present. RRR.   ABD: Soft, non-tender, non-distended  EXT: no c/c/e  NEURO: AAOX3    Assessment and Plan:  38 y o f with pmh of SLE , RA on prednisone , hydroxychloroquine , PE on coumadin , gastritis , fibromyalgia and insomnia presented to ER with c/o 1 wk h/o weakness , myalgias and 1 day h/o chest pain and left shoulder pain .     # CHILLS / MYALGIAS / LIKELY VIRAL ETIOLOGY DOUBT FLU AS SYMPTOMS HAVE RESOLVED / RA NOT TAKING PREDNISONE   - prednisone taper starting at 20 daily  - rheumatology consulted  - CTA negative for PE, CXR wnl  - supportive care.  - will continue with plaquenil .  - PT /OT eval.    # SLE FLARE:    - will c/w plaquenil and steroids .    #  H/O PE :    - takes warfarin at home but will switch to xarelto as her INR was subtherapeutic  - xarelto sent to pharmacy to check for insurance approval    # DISPO:    -full code from home. Providers and the authorized Fact Sheet for Patients of the ID NOW COVID-19 are available on the FDA website:    https://www.fda.gov/media/288706/download      https://www.fda.gov/media/594128/download              Imaging Results    None          Medications   ibuprofen 20 mg/mL oral liquid 314 mg (314 mg Oral Given 8/25/24 1858)     Medical Decision Making  Amount and/or Complexity of Data Reviewed  Labs: ordered.    Risk  OTC drugs.               ED Course as of 08/25/24 1923   Sun Aug 25, 2024   1920 Rapid COVID-19 negative.  Patient encouraged to follow-up with pediatrician for further evaluation.  His mother is also coached on weight based dosing of Tylenol and Motrin [CB]      ED Course User Index  [CB] Lakeisha Casey NP                           Clinical Impression:  Final diagnoses:  [M79.10] Myalgia (Primary)          ED Disposition Condition    Discharge Stable          ED Prescriptions       Medication Sig Dispense Start Date End Date Auth. Provider    ibuprofen 20 mg/mL oral liquid Take 15.7 mLs (314 mg total) by mouth every 6 (six) hours as needed for Pain. 237 mL 8/25/2024 8/30/2024 Lakeisha Casey NP    acetaminophen (TYLENOL) 160 mg/5 mL Liqd Take 14.7 mLs (470.4 mg total) by mouth every 6 (six) hours as needed (Pain). 236 mL 8/25/2024 8/30/2024 Lakeisha Casey NP          Follow-up Information       Follow up With Specialties Details Why Contact Info    PCP Follow UP  Schedule an appointment as soon as possible for a visit in 2 days for follow-up, for re-evaluation of today's complaint              Lakeisha Casey NP  08/25/24 1923

## 2025-01-07 ENCOUNTER — INPATIENT (INPATIENT)
Facility: HOSPITAL | Age: 45
LOS: 11 days | Discharge: HOME HEALTH | DRG: 384 | End: 2025-01-19
Attending: INTERNAL MEDICINE | Admitting: STUDENT IN AN ORGANIZED HEALTH CARE EDUCATION/TRAINING PROGRAM
Payer: MEDICARE

## 2025-01-07 VITALS
DIASTOLIC BLOOD PRESSURE: 86 MMHG | RESPIRATION RATE: 20 BRPM | OXYGEN SATURATION: 96 % | HEIGHT: 67 IN | WEIGHT: 203.05 LBS | TEMPERATURE: 98 F | HEART RATE: 79 BPM | SYSTOLIC BLOOD PRESSURE: 128 MMHG

## 2025-01-07 DIAGNOSIS — Z98.890 OTHER SPECIFIED POSTPROCEDURAL STATES: Chronic | ICD-10-CM

## 2025-01-07 DIAGNOSIS — R62.7 ADULT FAILURE TO THRIVE: ICD-10-CM

## 2025-01-07 LAB
ALBUMIN SERPL ELPH-MCNC: 4 G/DL — SIGNIFICANT CHANGE UP (ref 3.5–5.2)
ALBUMIN SERPL ELPH-MCNC: 4.4 G/DL — SIGNIFICANT CHANGE UP (ref 3.5–5.2)
ALP SERPL-CCNC: 54 U/L — SIGNIFICANT CHANGE UP (ref 30–115)
ALP SERPL-CCNC: 56 U/L — SIGNIFICANT CHANGE UP (ref 30–115)
ALT FLD-CCNC: 13 U/L — SIGNIFICANT CHANGE UP (ref 0–41)
ALT FLD-CCNC: 14 U/L — SIGNIFICANT CHANGE UP (ref 0–41)
ANION GAP SERPL CALC-SCNC: 12 MMOL/L — SIGNIFICANT CHANGE UP (ref 7–14)
ANION GAP SERPL CALC-SCNC: 9 MMOL/L — SIGNIFICANT CHANGE UP (ref 7–14)
APPEARANCE UR: CLEAR — SIGNIFICANT CHANGE UP
APTT BLD: 57.4 SEC — HIGH (ref 27–39.2)
AST SERPL-CCNC: 18 U/L — SIGNIFICANT CHANGE UP (ref 0–41)
AST SERPL-CCNC: 19 U/L — SIGNIFICANT CHANGE UP (ref 0–41)
BACTERIA # UR AUTO: NEGATIVE /HPF — SIGNIFICANT CHANGE UP
BASOPHILS # BLD AUTO: 0.08 K/UL — SIGNIFICANT CHANGE UP (ref 0–0.2)
BASOPHILS # BLD AUTO: 0.08 K/UL — SIGNIFICANT CHANGE UP (ref 0–0.2)
BASOPHILS NFR BLD AUTO: 1.4 % — HIGH (ref 0–1)
BASOPHILS NFR BLD AUTO: 1.8 % — HIGH (ref 0–1)
BILIRUB SERPL-MCNC: 0.3 MG/DL — SIGNIFICANT CHANGE UP (ref 0.2–1.2)
BILIRUB SERPL-MCNC: 0.4 MG/DL — SIGNIFICANT CHANGE UP (ref 0.2–1.2)
BILIRUB UR-MCNC: NEGATIVE — SIGNIFICANT CHANGE UP
BUN SERPL-MCNC: 11 MG/DL — SIGNIFICANT CHANGE UP (ref 10–20)
BUN SERPL-MCNC: 14 MG/DL — SIGNIFICANT CHANGE UP (ref 10–20)
CALCIUM SERPL-MCNC: 9 MG/DL — SIGNIFICANT CHANGE UP (ref 8.4–10.4)
CALCIUM SERPL-MCNC: 9.2 MG/DL — SIGNIFICANT CHANGE UP (ref 8.4–10.5)
CAST: 2 /LPF — SIGNIFICANT CHANGE UP (ref 0–4)
CHLORIDE SERPL-SCNC: 105 MMOL/L — SIGNIFICANT CHANGE UP (ref 98–110)
CHLORIDE SERPL-SCNC: 108 MMOL/L — SIGNIFICANT CHANGE UP (ref 98–110)
CO2 SERPL-SCNC: 24 MMOL/L — SIGNIFICANT CHANGE UP (ref 17–32)
CO2 SERPL-SCNC: 26 MMOL/L — SIGNIFICANT CHANGE UP (ref 17–32)
COLOR SPEC: YELLOW — SIGNIFICANT CHANGE UP
CREAT SERPL-MCNC: 0.7 MG/DL — SIGNIFICANT CHANGE UP (ref 0.7–1.5)
CREAT SERPL-MCNC: 0.7 MG/DL — SIGNIFICANT CHANGE UP (ref 0.7–1.5)
CRP SERPL-MCNC: <3 MG/L — SIGNIFICANT CHANGE UP
CRP SERPL-MCNC: <3 MG/L — SIGNIFICANT CHANGE UP
DIFF PNL FLD: NEGATIVE — SIGNIFICANT CHANGE UP
EGFR: 108 ML/MIN/1.73M2 — SIGNIFICANT CHANGE UP
EGFR: 108 ML/MIN/1.73M2 — SIGNIFICANT CHANGE UP
EOSINOPHIL # BLD AUTO: 0.08 K/UL — SIGNIFICANT CHANGE UP (ref 0–0.7)
EOSINOPHIL # BLD AUTO: 0.08 K/UL — SIGNIFICANT CHANGE UP (ref 0–0.7)
EOSINOPHIL NFR BLD AUTO: 1.4 % — SIGNIFICANT CHANGE UP (ref 0–8)
EOSINOPHIL NFR BLD AUTO: 1.8 % — SIGNIFICANT CHANGE UP (ref 0–8)
ERYTHROCYTE [SEDIMENTATION RATE] IN BLOOD: 11 MM/HR — SIGNIFICANT CHANGE UP (ref 0–20)
ERYTHROCYTE [SEDIMENTATION RATE] IN BLOOD: 7 MM/HR — SIGNIFICANT CHANGE UP (ref 0–20)
GLUCOSE SERPL-MCNC: 83 MG/DL — SIGNIFICANT CHANGE UP (ref 70–99)
GLUCOSE SERPL-MCNC: 86 MG/DL — SIGNIFICANT CHANGE UP (ref 70–99)
GLUCOSE UR QL: NEGATIVE MG/DL — SIGNIFICANT CHANGE UP
HCG SERPL QL: ABNORMAL
HCG SERPL-ACNC: 5.9 MIU/ML — HIGH
HCT VFR BLD CALC: 44.4 % — SIGNIFICANT CHANGE UP (ref 37–47)
HCT VFR BLD CALC: 44.9 % — SIGNIFICANT CHANGE UP (ref 37–47)
HGB BLD-MCNC: 14.1 G/DL — SIGNIFICANT CHANGE UP (ref 12–16)
HGB BLD-MCNC: 14.6 G/DL — SIGNIFICANT CHANGE UP (ref 12–16)
IMM GRANULOCYTES NFR BLD AUTO: 0.2 % — SIGNIFICANT CHANGE UP (ref 0.1–0.3)
IMM GRANULOCYTES NFR BLD AUTO: 0.4 % — HIGH (ref 0.1–0.3)
INR BLD: 4.65 RATIO — HIGH (ref 0.65–1.3)
KETONES UR-MCNC: 15 MG/DL
LEUKOCYTE ESTERASE UR-ACNC: ABNORMAL
LIDOCAIN IGE QN: 40 U/L — SIGNIFICANT CHANGE UP (ref 7–60)
LYMPHOCYTES # BLD AUTO: 1.98 K/UL — SIGNIFICANT CHANGE UP (ref 1.2–3.4)
LYMPHOCYTES # BLD AUTO: 2.54 K/UL — SIGNIFICANT CHANGE UP (ref 1.2–3.4)
LYMPHOCYTES # BLD AUTO: 43.1 % — SIGNIFICANT CHANGE UP (ref 20.5–51.1)
LYMPHOCYTES # BLD AUTO: 43.8 % — SIGNIFICANT CHANGE UP (ref 20.5–51.1)
MAGNESIUM SERPL-MCNC: 2 MG/DL — SIGNIFICANT CHANGE UP (ref 1.8–2.4)
MAGNESIUM SERPL-MCNC: 2 MG/DL — SIGNIFICANT CHANGE UP (ref 1.8–2.4)
MCHC RBC-ENTMCNC: 31.1 PG — HIGH (ref 27–31)
MCHC RBC-ENTMCNC: 31.7 PG — HIGH (ref 27–31)
MCHC RBC-ENTMCNC: 31.8 G/DL — LOW (ref 32–37)
MCHC RBC-ENTMCNC: 32.5 G/DL — SIGNIFICANT CHANGE UP (ref 32–37)
MCV RBC AUTO: 97.4 FL — SIGNIFICANT CHANGE UP (ref 81–99)
MCV RBC AUTO: 98 FL — SIGNIFICANT CHANGE UP (ref 81–99)
MONOCYTES # BLD AUTO: 0.47 K/UL — SIGNIFICANT CHANGE UP (ref 0.1–0.6)
MONOCYTES # BLD AUTO: 0.48 K/UL — SIGNIFICANT CHANGE UP (ref 0.1–0.6)
MONOCYTES NFR BLD AUTO: 10.6 % — HIGH (ref 1.7–9.3)
MONOCYTES NFR BLD AUTO: 8 % — SIGNIFICANT CHANGE UP (ref 1.7–9.3)
NEUTROPHILS # BLD AUTO: 1.88 K/UL — SIGNIFICANT CHANGE UP (ref 1.4–6.5)
NEUTROPHILS # BLD AUTO: 2.72 K/UL — SIGNIFICANT CHANGE UP (ref 1.4–6.5)
NEUTROPHILS NFR BLD AUTO: 41.6 % — LOW (ref 42.2–75.2)
NEUTROPHILS NFR BLD AUTO: 45.9 % — SIGNIFICANT CHANGE UP (ref 42.2–75.2)
NITRITE UR-MCNC: NEGATIVE — SIGNIFICANT CHANGE UP
NRBC # BLD: 0 /100 WBCS — SIGNIFICANT CHANGE UP (ref 0–0)
NRBC # BLD: 0 /100 WBCS — SIGNIFICANT CHANGE UP (ref 0–0)
NRBC BLD-RTO: 0 /100 WBCS — SIGNIFICANT CHANGE UP (ref 0–0)
NRBC BLD-RTO: 0 /100 WBCS — SIGNIFICANT CHANGE UP (ref 0–0)
PH UR: 5 — SIGNIFICANT CHANGE UP (ref 5–8)
PLATELET # BLD AUTO: 243 K/UL — SIGNIFICANT CHANGE UP (ref 130–400)
PLATELET # BLD AUTO: 251 K/UL — SIGNIFICANT CHANGE UP (ref 130–400)
PMV BLD: 10.1 FL — SIGNIFICANT CHANGE UP (ref 7.4–10.4)
PMV BLD: 10.3 FL — SIGNIFICANT CHANGE UP (ref 7.4–10.4)
POTASSIUM SERPL-MCNC: 4 MMOL/L — SIGNIFICANT CHANGE UP (ref 3.5–5)
POTASSIUM SERPL-MCNC: 4.2 MMOL/L — SIGNIFICANT CHANGE UP (ref 3.5–5)
POTASSIUM SERPL-SCNC: 4 MMOL/L — SIGNIFICANT CHANGE UP (ref 3.5–5)
POTASSIUM SERPL-SCNC: 4.2 MMOL/L — SIGNIFICANT CHANGE UP (ref 3.5–5)
PROT SERPL-MCNC: 6.4 G/DL — SIGNIFICANT CHANGE UP (ref 6–8)
PROT SERPL-MCNC: 6.8 G/DL — SIGNIFICANT CHANGE UP (ref 6–8)
PROT UR-MCNC: SIGNIFICANT CHANGE UP MG/DL
PROTHROM AB SERPL-ACNC: >40 SEC — HIGH (ref 9.95–12.87)
RBC # BLD: 4.53 M/UL — SIGNIFICANT CHANGE UP (ref 4.2–5.4)
RBC # BLD: 4.61 M/UL — SIGNIFICANT CHANGE UP (ref 4.2–5.4)
RBC # FLD: 12 % — SIGNIFICANT CHANGE UP (ref 11.5–14.5)
RBC # FLD: 12 % — SIGNIFICANT CHANGE UP (ref 11.5–14.5)
RBC CASTS # UR COMP ASSIST: 14 /HPF — HIGH (ref 0–4)
SODIUM SERPL-SCNC: 141 MMOL/L — SIGNIFICANT CHANGE UP (ref 135–146)
SODIUM SERPL-SCNC: 143 MMOL/L — SIGNIFICANT CHANGE UP (ref 135–146)
SP GR SPEC: >1.03 — HIGH (ref 1–1.03)
SQUAMOUS # UR AUTO: 8 /HPF — HIGH (ref 0–5)
TROPONIN T, HIGH SENSITIVITY RESULT: <6 NG/L — SIGNIFICANT CHANGE UP (ref 6–13)
TROPONIN T, HIGH SENSITIVITY RESULT: <6 NG/L — SIGNIFICANT CHANGE UP (ref 6–13)
UROBILINOGEN FLD QL: 1 MG/DL — SIGNIFICANT CHANGE UP (ref 0.2–1)
WBC # BLD: 4.52 K/UL — LOW (ref 4.8–10.8)
WBC # BLD: 5.9 K/UL — SIGNIFICANT CHANGE UP (ref 4.8–10.8)
WBC # FLD AUTO: 4.52 K/UL — LOW (ref 4.8–10.8)
WBC # FLD AUTO: 5.9 K/UL — SIGNIFICANT CHANGE UP (ref 4.8–10.8)
WBC UR QL: 14 /HPF — HIGH (ref 0–5)

## 2025-01-07 PROCEDURE — 85730 THROMBOPLASTIN TIME PARTIAL: CPT

## 2025-01-07 PROCEDURE — 74177 CT ABD & PELVIS W/CONTRAST: CPT | Mod: 26,MC

## 2025-01-07 PROCEDURE — 76830 TRANSVAGINAL US NON-OB: CPT

## 2025-01-07 PROCEDURE — 87086 URINE CULTURE/COLONY COUNT: CPT

## 2025-01-07 PROCEDURE — 86225 DNA ANTIBODY NATIVE: CPT

## 2025-01-07 PROCEDURE — 80048 BASIC METABOLIC PNL TOTAL CA: CPT

## 2025-01-07 PROCEDURE — D0210: CPT

## 2025-01-07 PROCEDURE — D7140: CPT

## 2025-01-07 PROCEDURE — 84702 CHORIONIC GONADOTROPIN TEST: CPT

## 2025-01-07 PROCEDURE — D0150: CPT

## 2025-01-07 PROCEDURE — 86147 CARDIOLIPIN ANTIBODY EA IG: CPT

## 2025-01-07 PROCEDURE — 86140 C-REACTIVE PROTEIN: CPT

## 2025-01-07 PROCEDURE — 85652 RBC SED RATE AUTOMATED: CPT

## 2025-01-07 PROCEDURE — 85025 COMPLETE CBC W/AUTO DIFF WBC: CPT

## 2025-01-07 PROCEDURE — 80053 COMPREHEN METABOLIC PANEL: CPT

## 2025-01-07 PROCEDURE — 86146 BETA-2 GLYCOPROTEIN ANTIBODY: CPT

## 2025-01-07 PROCEDURE — 74018 RADEX ABDOMEN 1 VIEW: CPT

## 2025-01-07 PROCEDURE — 71045 X-RAY EXAM CHEST 1 VIEW: CPT | Mod: 26

## 2025-01-07 PROCEDURE — 86235 NUCLEAR ANTIGEN ANTIBODY: CPT

## 2025-01-07 PROCEDURE — 93010 ELECTROCARDIOGRAM REPORT: CPT

## 2025-01-07 PROCEDURE — 81001 URINALYSIS AUTO W/SCOPE: CPT

## 2025-01-07 PROCEDURE — 88305 TISSUE EXAM BY PATHOLOGIST: CPT

## 2025-01-07 PROCEDURE — 36415 COLL VENOUS BLD VENIPUNCTURE: CPT

## 2025-01-07 PROCEDURE — 85613 RUSSELL VIPER VENOM DILUTED: CPT

## 2025-01-07 PROCEDURE — 99285 EMERGENCY DEPT VISIT HI MDM: CPT | Mod: FS

## 2025-01-07 PROCEDURE — 85732 THROMBOPLASTIN TIME PARTIAL: CPT

## 2025-01-07 PROCEDURE — 99222 1ST HOSP IP/OBS MODERATE 55: CPT

## 2025-01-07 PROCEDURE — 86160 COMPLEMENT ANTIGEN: CPT

## 2025-01-07 PROCEDURE — 88312 SPECIAL STAINS GROUP 1: CPT

## 2025-01-07 PROCEDURE — 71275 CT ANGIOGRAPHY CHEST: CPT | Mod: 26,MC

## 2025-01-07 PROCEDURE — 93005 ELECTROCARDIOGRAM TRACING: CPT

## 2025-01-07 PROCEDURE — 85610 PROTHROMBIN TIME: CPT

## 2025-01-07 PROCEDURE — 84484 ASSAY OF TROPONIN QUANT: CPT

## 2025-01-07 PROCEDURE — 76705 ECHO EXAM OF ABDOMEN: CPT

## 2025-01-07 PROCEDURE — 83735 ASSAY OF MAGNESIUM: CPT

## 2025-01-07 RX ORDER — ACETAMINOPHEN 160 MG/5ML
650 SUSPENSION ORAL EVERY 6 HOURS
Refills: 0 | Status: DISCONTINUED | OUTPATIENT
Start: 2025-01-07 | End: 2025-01-08

## 2025-01-07 RX ORDER — PANTOPRAZOLE 20 MG/1
40 TABLET, DELAYED RELEASE ORAL EVERY 12 HOURS
Refills: 0 | Status: DISCONTINUED | OUTPATIENT
Start: 2025-01-07 | End: 2025-01-08

## 2025-01-07 RX ORDER — MORPHINE SULFATE 60 MG/1
4 TABLET, FILM COATED, EXTENDED RELEASE ORAL ONCE
Refills: 0 | Status: DISCONTINUED | OUTPATIENT
Start: 2025-01-07 | End: 2025-01-07

## 2025-01-07 RX ORDER — ONDANSETRON 4 MG/1
4 TABLET, ORALLY DISINTEGRATING ORAL EVERY 8 HOURS
Refills: 0 | Status: DISCONTINUED | OUTPATIENT
Start: 2025-01-07 | End: 2025-01-15

## 2025-01-07 RX ORDER — SUCRALFATE 1 G/10ML
1 SUSPENSION ORAL EVERY 6 HOURS
Refills: 0 | Status: DISCONTINUED | OUTPATIENT
Start: 2025-01-07 | End: 2025-01-19

## 2025-01-07 RX ORDER — PANTOPRAZOLE 20 MG/1
40 TABLET, DELAYED RELEASE ORAL ONCE
Refills: 0 | Status: COMPLETED | OUTPATIENT
Start: 2025-01-07 | End: 2025-01-07

## 2025-01-07 RX ORDER — KETOROLAC TROMETHAMINE 10 MG
15 TABLET ORAL ONCE
Refills: 0 | Status: DISCONTINUED | OUTPATIENT
Start: 2025-01-07 | End: 2025-01-07

## 2025-01-07 RX ORDER — ALPRAZOLAM 2 MG
0.5 TABLET ORAL ONCE
Refills: 0 | Status: DISCONTINUED | OUTPATIENT
Start: 2025-01-07 | End: 2025-01-07

## 2025-01-07 RX ORDER — PANTOPRAZOLE 20 MG/1
40 TABLET, DELAYED RELEASE ORAL EVERY 12 HOURS
Refills: 0 | Status: DISCONTINUED | OUTPATIENT
Start: 2025-01-07 | End: 2025-01-07

## 2025-01-07 RX ORDER — SODIUM CHLORIDE 9 G/ML
1000 INJECTION, SOLUTION INTRAVENOUS ONCE
Refills: 0 | Status: COMPLETED | OUTPATIENT
Start: 2025-01-07 | End: 2025-01-07

## 2025-01-07 RX ORDER — ONDANSETRON 4 MG/1
4 TABLET, ORALLY DISINTEGRATING ORAL ONCE
Refills: 0 | Status: COMPLETED | OUTPATIENT
Start: 2025-01-07 | End: 2025-01-07

## 2025-01-07 RX ORDER — BACTERIOSTATIC SODIUM CHLORIDE 0.9 %
1000 VIAL (ML) INJECTION
Refills: 0 | Status: DISCONTINUED | OUTPATIENT
Start: 2025-01-07 | End: 2025-01-10

## 2025-01-07 RX ORDER — ALPRAZOLAM 2 MG
0.5 TABLET ORAL EVERY 12 HOURS
Refills: 0 | Status: DISCONTINUED | OUTPATIENT
Start: 2025-01-07 | End: 2025-01-14

## 2025-01-07 RX ORDER — FAMOTIDINE 10 MG/ML
20 INJECTION INTRAVENOUS ONCE
Refills: 0 | Status: COMPLETED | OUTPATIENT
Start: 2025-01-07 | End: 2025-01-07

## 2025-01-07 RX ORDER — OXYCODONE HYDROCHLORIDE AND ACETAMINOPHEN 5; 325 MG/1; MG/1
1 TABLET ORAL EVERY 6 HOURS
Refills: 0 | Status: DISCONTINUED | OUTPATIENT
Start: 2025-01-07 | End: 2025-01-07

## 2025-01-07 RX ORDER — MORPHINE SULFATE 60 MG/1
4 TABLET, FILM COATED, EXTENDED RELEASE ORAL EVERY 6 HOURS
Refills: 0 | Status: DISCONTINUED | OUTPATIENT
Start: 2025-01-07 | End: 2025-01-08

## 2025-01-07 RX ADMIN — FAMOTIDINE 20 MILLIGRAM(S): 10 INJECTION INTRAVENOUS at 01:52

## 2025-01-07 RX ADMIN — ONDANSETRON 4 MILLIGRAM(S): 4 TABLET, ORALLY DISINTEGRATING ORAL at 15:06

## 2025-01-07 RX ADMIN — PANTOPRAZOLE 40 MILLIGRAM(S): 20 TABLET, DELAYED RELEASE ORAL at 18:04

## 2025-01-07 RX ADMIN — MORPHINE SULFATE 4 MILLIGRAM(S): 60 TABLET, FILM COATED, EXTENDED RELEASE ORAL at 01:50

## 2025-01-07 RX ADMIN — MORPHINE SULFATE 4 MILLIGRAM(S): 60 TABLET, FILM COATED, EXTENDED RELEASE ORAL at 05:14

## 2025-01-07 RX ADMIN — PANTOPRAZOLE 40 MILLIGRAM(S): 20 TABLET, DELAYED RELEASE ORAL at 07:05

## 2025-01-07 RX ADMIN — Medication 15 MILLIGRAM(S): at 01:54

## 2025-01-07 RX ADMIN — ONDANSETRON 4 MILLIGRAM(S): 4 TABLET, ORALLY DISINTEGRATING ORAL at 01:53

## 2025-01-07 RX ADMIN — MORPHINE SULFATE 4 MILLIGRAM(S): 60 TABLET, FILM COATED, EXTENDED RELEASE ORAL at 15:03

## 2025-01-07 RX ADMIN — SUCRALFATE 1 GRAM(S): 1 SUSPENSION ORAL at 13:47

## 2025-01-07 RX ADMIN — Medication 100 MILLILITER(S): at 09:30

## 2025-01-07 RX ADMIN — Medication 0.5 MILLIGRAM(S): at 20:28

## 2025-01-07 RX ADMIN — MORPHINE SULFATE 4 MILLIGRAM(S): 60 TABLET, FILM COATED, EXTENDED RELEASE ORAL at 15:20

## 2025-01-07 RX ADMIN — SODIUM CHLORIDE 1000 MILLILITER(S): 9 INJECTION, SOLUTION INTRAVENOUS at 01:55

## 2025-01-07 NOTE — ED ADULT NURSE NOTE - SUICIDE SCREENING QUESTION 2
Problem: Discharge Planning  Goal: Discharge to home or other facility with appropriate resources  7/21/2022 1519 by Belva Brittle, RN  Outcome: Progressing  7/21/2022 0259 by Tammy Griggs RN  Outcome: Progressing No

## 2025-01-07 NOTE — ED PROVIDER NOTE - CLINICAL SUMMARY MEDICAL DECISION MAKING FREE TEXT BOX
pt with chronic pain and cp  labs, check EKG, CXR, check CTA of chest and abdomen and high probability of admission given the pain that she is experiencing.

## 2025-01-07 NOTE — ED ADULT NURSE NOTE - OBJECTIVE STATEMENT
Pt is AOX4. Pt endorses having chest pain for the last month,  and unable to function due to the severity of the pain. Pt has a hx of Lupus and often takes percocet to help with pain. Last dose of percocet was this morning. Pt endorses nausea and unable to keep any food down for the past two days. Pt endorses SOB that has worsened throughout the day, Pt noticed the symptoms discussed above getting worse after family member had a heart attack. Pt placed on DASH. Bed locked and in lowest position. Call bell within reach.

## 2025-01-07 NOTE — ED ADULT TRIAGE NOTE - NS ED TRIAGE EKG FT
Radiation Therapy Patient Education    Person involved with teaching: Patient    Patient educational needs for self management of treatment-related side effects assessment completed.  [facility] Patient Ed tab contains Patient Learning Assessment    Education Materials Given  Radiation Therapy for Head and Neck    Educational Topics Discussed  Side effects expected, Pain management, Skin care, Nutrition and weight loss and When to call MD/RN    Response To Teaching  Verbalizes understanding    GYN Only  Vaginal Dilator-given and educated: N/A    Referrals sent: Dental, Speech and Swallowing and Nutrition    Chemotherapy?  No      
DR Oleary

## 2025-01-07 NOTE — ED ADULT NURSE NOTE - NSICDXFAMILYHX_GEN_ALL_CORE_FT
FAMILY HISTORY:  Mother  Still living? Yes, Estimated age: Age Unknown  Family history of CHF (congestive heart failure), Age at diagnosis: Age Unknown

## 2025-01-07 NOTE — ED PROVIDER NOTE - CARE PLAN
1 Principal Discharge DX:	Coumadin toxicity  Secondary Diagnosis:	Back pain  Secondary Diagnosis:	Chest pain   Principal Discharge DX:	Adult failure to thrive  Secondary Diagnosis:	Back pain  Secondary Diagnosis:	Chest pain

## 2025-01-07 NOTE — H&P ADULT - HISTORY OF PRESENT ILLNESS
45 y/o female with a PMHX of PE (on warfarin), Lupus,  RA, gastritis and ectopic pregnancy s/p L salpingectomy  who presents to the hospital for evaluation of a 1 month history of intractable abdominal pain. Patient reports that the pain started in early December and has recently been associated with vomiting NBNB emesis and decreased PO intake. She denies any fever, chills, headache, shortness of breath, diarrhea or dysuria     Of note patient has been taking low dose prednisone for the past 7 years after her last lupus flare.     ED Course      Vitals: Temp 98.4, HR 79, /86, RR 20     Imaging: CT PE/ Abd/Pelvis: No evidence of PE or acute intraabdominal pathology     Labs: + for BHCG (5.9)

## 2025-01-07 NOTE — ED ADULT TRIAGE NOTE - HEIGHT IN CM
Problem: Adult Inpatient Plan of Care  Goal: Plan of Care Review  Outcome: Ongoing, Progressing  Goal: Patient-Specific Goal (Individualized)  Outcome: Ongoing, Progressing  Goal: Absence of Hospital-Acquired Illness or Injury  Outcome: Ongoing, Progressing  Goal: Optimal Comfort and Wellbeing  Outcome: Ongoing, Progressing  Goal: Readiness for Transition of Care  Outcome: Ongoing, Progressing     Problem: Infection  Goal: Absence of Infection Signs and Symptoms  Outcome: Ongoing, Progressing     Problem: Skin Injury Risk Increased  Goal: Skin Health and Integrity  Outcome: Ongoing, Progressing     Problem: Fall Injury Risk  Goal: Absence of Fall and Fall-Related Injury  Outcome: Ongoing, Progressing     Problem: Communication Impairment (Mechanical Ventilation, Invasive)  Goal: Effective Communication  Outcome: Ongoing, Progressing     Problem: Nutrition Impairment (Mechanical Ventilation, Invasive)  Goal: Optimal Nutrition Delivery  Outcome: Ongoing, Progressing     Problem: Skin and Tissue Injury (Mechanical Ventilation, Invasive)  Goal: Absence of Device-Related Skin and Tissue Injury  Outcome: Ongoing, Progressing     Problem: Ventilator-Induced Lung Injury (Mechanical Ventilation, Invasive)  Goal: Absence of Ventilator-Induced Lung Injury  Outcome: Ongoing, Progressing     Problem: Skin and Tissue Injury (Artificial Airway)  Goal: Absence of Device-Related Skin or Tissue Injury  Outcome: Ongoing, Progressing     Problem: Device-Related Complication Risk (CRRT (Continuous Renal Replacement Therapy))  Goal: Safe, Effective Therapy Delivery  Outcome: Ongoing, Progressing     Problem: Hypothermia (CRRT (Continuous Renal Replacement Therapy))  Goal: Body Temperature Maintained in Desired Range  Outcome: Ongoing, Progressing     Problem: Infection (CRRT (Continuous Renal Replacement Therapy))  Goal: Absence of Infection Signs and Symptoms  Outcome: Ongoing, Progressing     Problem: Device-Related Complication Risk  (Hemodialysis)  Goal: Safe, Effective Therapy Delivery  Outcome: Ongoing, Progressing     Problem: Hemodynamic Instability (Hemodialysis)  Goal: Effective Tissue Perfusion  Outcome: Ongoing, Progressing     Problem: Infection (Hemodialysis)  Goal: Absence of Infection Signs and Symptoms  Outcome: Ongoing, Progressing     Problem: Hyperglycemia  Goal: Blood Glucose Level Within Targeted Range  Outcome: Ongoing, Progressing      170.18

## 2025-01-07 NOTE — H&P ADULT - ATTENDING COMMENTS
45 y/o female with a PMHX of PE (on warfarin), Lupus,  RA, gastritis and ectopic pregnancy s/p L salpingectomy  who presents to the hospital for evaluation of a 1 month history of intractable abdominal pain. Patient reports that the pain started in early December and has recently been associated with vomiting NBNB emesis and decreased PO intake. She denies any fever, chills, headache, shortness of breath, diarrhea or dysuria .Of note patient has been taking low dose prednisone for the past 7 years after her last lupus flare.     #Suspected gastritis 2/2 to chronic steroid use   #chronic abdominal pain/nausea/vomiting  Patient has been taking low dose prednisone for 7 years (currently on 5mg daily)  - GI consult for possible EGD  - PPI BID   -c/w carafate   - Antiemetics PRN  - Cont pain control    #+BHCG  #Hx of ectopic pregnancy   -patient is sexually active   -low suspicion for repeat ectopic pregnancy at this time given that level is borderline elevated  -patient endorses having last menstrual cycle approximately 3 months     #Hx of PE  -daily INR   -warfarin dosing as per pharmacy , holding for today  -can consider transition to eliquis as patient was never started on it     #Hx of Lupus  #Hx of RA  -follows with Dr. Sim Sharpe  -no reported recent flairs   -on low dose prednisone (5mg daily)   -hold prednisone for now, pt will need to fu with rheumatologist outpatient regarding discontinuing steroids    DVT prophylaxis: warfarin     #Progress Note Handoff  Pending (specify): Pain control, GI fu  Family discussion: dw pt regarding plan of care  Disposition: GMF, from home

## 2025-01-07 NOTE — ED PROVIDER NOTE - PHYSICAL EXAMINATION
CONSTITUTIONAL: Well-appearing; in no apparent distress.   EYES: PERRL; EOM intact.   CARDIOVASCULAR: Normal S1, S2; no murmurs, rubs, or gallops.   RESPIRATORY: Normal chest excursion with respiration; breath sounds clear and equal bilaterally; no wheezes, rhonchi, or rales.  GI/: Normal bowel sounds; non-distended; mild diffuse abdominal tenderness   MS: No calf swelling and tenderness.  SKIN: Normal for age and race; warm; dry; good turgor; no apparent lesions or exudate.   NEURO/PSYCH: A & O x 4; grossly unremarkable.

## 2025-01-07 NOTE — ED PROVIDER NOTE - OBJECTIVE STATEMENT
44 years old female history of rheumatoid arthritis, pulmonary embolisms on warfarin, lupus, gastritis, fibromyalgia presents complaint of chest pain, abdominal pain, back pain with nausea, vomiting for almost 1 month.  Reports she is having more trouble to even tolerate any solid food and liquid over the past week so she comes to ED for evaluation.  Had vomiting earlier today, reports NBNB vomitus.  Otherwise denies fever, chills, diarrhea or urinary symptoms.

## 2025-01-07 NOTE — ED PROVIDER NOTE - ATTENDING APP SHARED VISIT CONTRIBUTION OF CARE
44-year-old female past medical history of PE (on warfarin), lupus, RA, ectopic with salpingectomy presents to the ED for 4 weeks of mid chest pain that goes to her back, associated nausea/vomiting but denies any diarrhea.  Patient states she just returned from [place] via train, and had an exacerbation of all the symptoms and wanted to get evaluated.  Patient denies any fever/chills/dysuria/rash/shortness of breath/abdominal pain/leg swelling or calf tenderness.  Denies any use of any hormones.  States she has not followed up with the Coumadin clinic for many weeks, and is on what ever dose the doctor gives her at the time she sees the doctor.    Exam as per PA note.  Agree with management.  Check labs, check EKG, CXR, check CTA of chest and abdomen and high probability of admission given the pain that she is experiencing.      ALL: cold plasma--hives  PMH/PSurgHx: PE on warfarin, Lupus, RA, ectopic pregnancy with removal of tube  Meds: Warfarin (sometimes 5 mg, sometimes 10 mg), prednisone, duloxetine, Lyrica, Percocet, Xanax, folic acid  SH: +smoker, +etoh occ  PMD Tj Keever  LMP 2 months ago, perimenopause

## 2025-01-07 NOTE — H&P ADULT - ASSESSMENT
45 y/o female with a PMHX of PE (on warfarin), Lupus,  RA, gastritis and ectopic pregnancy s/p L salpingectomy  who presents to the hospital for evaluation of a 1 month history of intractable abdominal pain. Patient reports that the pain started in early December and has recently been associated with vomiting NBNB emesis and decreased PO intake. She denies any fever, chills, headache, shortness of breath, diarrhea or dysuria .Of note patient has been taking low dose prednisone for the past 7 years after her last lupus flare.     #Suspected gastritis 2/2 to chronic steroid use   #chronic abdominal pain  -patient has been taking low dose prednisone for 7 years (currently on 5mg daily)  -epigastric pain>1 month in duration not associated with meals or viral prodrome   - GI consult for possible EGD  -PPI QD   -c/w carafate   -    #+BHCG  #Hx of ectopic pregnancy   -patient is sexually active   -low suspicion for repeat ectopic pregnancy at this time given that level is borderline elevated  -patient endorses having last menstrual cycle approximately 3 months       #Hx of Lupus  #Hx of RA  -follows with Dr. Sim Sharpe  -no reported recent flairs   -on low dose prednisone (5mg daily)   -patient was suppose to be on taper but instead just takes 5mg QD at her own discretion   -hold prednisone for now    #MISC  DVT prophylaxis: warfarin   GI prophylaxis: Pantoprazole   Diet: Regular   Activity: IAT  45 y/o female with a PMHX of PE (on warfarin), Lupus,  RA, gastritis and ectopic pregnancy s/p L salpingectomy  who presents to the hospital for evaluation of a 1 month history of intractable abdominal pain. Patient reports that the pain started in early December and has recently been associated with vomiting NBNB emesis and decreased PO intake. She denies any fever, chills, headache, shortness of breath, diarrhea or dysuria .Of note patient has been taking low dose prednisone for the past 7 years after her last lupus flare.     #Suspected gastritis 2/2 to chronic steroid use   #chronic abdominal pain  -patient has been taking low dose prednisone for 7 years (currently on 5mg daily)  -epigastric pain>1 month in duration not associated with meals or viral prodrome   - GI consult for possible EGD  -PPI QD   -c/w carafate   -    #+BHCG  #Hx of ectopic pregnancy   -patient is sexually active   -low suspicion for repeat ectopic pregnancy at this time given that level is borderline elevated  -patient endorses having last menstrual cycle approximately 3 months     #Hx of PE  -daily INR   -warfarin dosing as per pharmacy   -can consider transition to eliquis as patient was never started on it       #Hx of Lupus  #Hx of RA  -follows with Dr. Sim Sharpe  -no reported recent flairs   -on low dose prednisone (5mg daily)   -patient was suppose to be on taper but instead just takes 5mg QD at her own discretion   -hold prednisone for now    #MISC  DVT prophylaxis: warfarin   GI prophylaxis: Pantoprazole   Diet: Regular   Activity: IAT

## 2025-01-07 NOTE — ED ADULT NURSE NOTE - NSICDXPASTMEDICALHX_GEN_ALL_CORE_FT
PAST MEDICAL HISTORY:  Fibromyalgia     Gastritis     Lupus (systemic lupus erythematosus)     Pulmonary embolism     Rheumatoid arthritis

## 2025-01-07 NOTE — ED ADULT NURSE REASSESSMENT NOTE - NS ED NURSE REASSESS COMMENT FT1
Pt endorses left sided chest pain that radiates towards the back. MD Howe notified, and per MD requests an EKG.

## 2025-01-08 LAB
ALBUMIN SERPL ELPH-MCNC: 4 G/DL — SIGNIFICANT CHANGE UP (ref 3.5–5.2)
ALP SERPL-CCNC: 50 U/L — SIGNIFICANT CHANGE UP (ref 30–115)
ALT FLD-CCNC: 13 U/L — SIGNIFICANT CHANGE UP (ref 0–41)
ANION GAP SERPL CALC-SCNC: 9 MMOL/L — SIGNIFICANT CHANGE UP (ref 7–14)
AST SERPL-CCNC: 17 U/L — SIGNIFICANT CHANGE UP (ref 0–41)
BASOPHILS # BLD AUTO: 0.06 K/UL — SIGNIFICANT CHANGE UP (ref 0–0.2)
BASOPHILS NFR BLD AUTO: 1.6 % — HIGH (ref 0–1)
BILIRUB SERPL-MCNC: 0.4 MG/DL — SIGNIFICANT CHANGE UP (ref 0.2–1.2)
BUN SERPL-MCNC: 10 MG/DL — SIGNIFICANT CHANGE UP (ref 10–20)
CALCIUM SERPL-MCNC: 8.5 MG/DL — SIGNIFICANT CHANGE UP (ref 8.4–10.5)
CHLORIDE SERPL-SCNC: 108 MMOL/L — SIGNIFICANT CHANGE UP (ref 98–110)
CO2 SERPL-SCNC: 26 MMOL/L — SIGNIFICANT CHANGE UP (ref 17–32)
CREAT SERPL-MCNC: 0.7 MG/DL — SIGNIFICANT CHANGE UP (ref 0.7–1.5)
CULTURE RESULTS: SIGNIFICANT CHANGE UP
EGFR: 108 ML/MIN/1.73M2 — SIGNIFICANT CHANGE UP
EOSINOPHIL # BLD AUTO: 0.08 K/UL — SIGNIFICANT CHANGE UP (ref 0–0.7)
EOSINOPHIL NFR BLD AUTO: 2.2 % — SIGNIFICANT CHANGE UP (ref 0–8)
GLUCOSE SERPL-MCNC: 89 MG/DL — SIGNIFICANT CHANGE UP (ref 70–99)
HCG SERPL-ACNC: 5.4 MIU/ML — HIGH
HCT VFR BLD CALC: 41.6 % — SIGNIFICANT CHANGE UP (ref 37–47)
HGB BLD-MCNC: 13.4 G/DL — SIGNIFICANT CHANGE UP (ref 12–16)
IMM GRANULOCYTES NFR BLD AUTO: 0.3 % — SIGNIFICANT CHANGE UP (ref 0.1–0.3)
INR BLD: 3.78 RATIO — HIGH (ref 0.65–1.3)
LYMPHOCYTES # BLD AUTO: 1.67 K/UL — SIGNIFICANT CHANGE UP (ref 1.2–3.4)
LYMPHOCYTES # BLD AUTO: 45.1 % — SIGNIFICANT CHANGE UP (ref 20.5–51.1)
MAGNESIUM SERPL-MCNC: 1.8 MG/DL — SIGNIFICANT CHANGE UP (ref 1.8–2.4)
MCHC RBC-ENTMCNC: 31.5 PG — HIGH (ref 27–31)
MCHC RBC-ENTMCNC: 32.2 G/DL — SIGNIFICANT CHANGE UP (ref 32–37)
MCV RBC AUTO: 97.9 FL — SIGNIFICANT CHANGE UP (ref 81–99)
MONOCYTES # BLD AUTO: 0.36 K/UL — SIGNIFICANT CHANGE UP (ref 0.1–0.6)
MONOCYTES NFR BLD AUTO: 9.7 % — HIGH (ref 1.7–9.3)
NEUTROPHILS # BLD AUTO: 1.52 K/UL — SIGNIFICANT CHANGE UP (ref 1.4–6.5)
NEUTROPHILS NFR BLD AUTO: 41.1 % — LOW (ref 42.2–75.2)
NRBC # BLD: 0 /100 WBCS — SIGNIFICANT CHANGE UP (ref 0–0)
NRBC BLD-RTO: 0 /100 WBCS — SIGNIFICANT CHANGE UP (ref 0–0)
PLATELET # BLD AUTO: 219 K/UL — SIGNIFICANT CHANGE UP (ref 130–400)
PMV BLD: 9.4 FL — SIGNIFICANT CHANGE UP (ref 7.4–10.4)
POTASSIUM SERPL-MCNC: 3.8 MMOL/L — SIGNIFICANT CHANGE UP (ref 3.5–5)
POTASSIUM SERPL-SCNC: 3.8 MMOL/L — SIGNIFICANT CHANGE UP (ref 3.5–5)
PROT SERPL-MCNC: 6.1 G/DL — SIGNIFICANT CHANGE UP (ref 6–8)
PROTHROM AB SERPL-ACNC: >40 SEC — HIGH (ref 9.95–12.87)
RBC # BLD: 4.25 M/UL — SIGNIFICANT CHANGE UP (ref 4.2–5.4)
RBC # FLD: 11.9 % — SIGNIFICANT CHANGE UP (ref 11.5–14.5)
SODIUM SERPL-SCNC: 143 MMOL/L — SIGNIFICANT CHANGE UP (ref 135–146)
SPECIMEN SOURCE: SIGNIFICANT CHANGE UP
TROPONIN T, HIGH SENSITIVITY RESULT: <6 NG/L — SIGNIFICANT CHANGE UP (ref 6–13)
WBC # BLD: 3.7 K/UL — LOW (ref 4.8–10.8)
WBC # FLD AUTO: 3.7 K/UL — LOW (ref 4.8–10.8)

## 2025-01-08 PROCEDURE — 99232 SBSQ HOSP IP/OBS MODERATE 35: CPT

## 2025-01-08 PROCEDURE — 99223 1ST HOSP IP/OBS HIGH 75: CPT

## 2025-01-08 RX ORDER — DIPHENHYDRAMINE HCL 25 MG
25 CAPSULE ORAL ONCE
Refills: 0 | Status: COMPLETED | OUTPATIENT
Start: 2025-01-08 | End: 2025-01-08

## 2025-01-08 RX ORDER — CALCIUM ACETATE 667 MG/1
667 CAPSULE ORAL
Refills: 0 | Status: DISCONTINUED | OUTPATIENT
Start: 2025-01-08 | End: 2025-01-19

## 2025-01-08 RX ORDER — PANTOPRAZOLE 20 MG/1
40 TABLET, DELAYED RELEASE ORAL EVERY 12 HOURS
Refills: 0 | Status: DISCONTINUED | OUTPATIENT
Start: 2025-01-08 | End: 2025-01-09

## 2025-01-08 RX ORDER — PREGABALIN CAPSULES, CV 225 MG/1
50 CAPSULE ORAL THREE TIMES A DAY
Refills: 0 | Status: DISCONTINUED | OUTPATIENT
Start: 2025-01-08 | End: 2025-01-09

## 2025-01-08 RX ADMIN — SUCRALFATE 1 GRAM(S): 1 SUSPENSION ORAL at 11:47

## 2025-01-08 RX ADMIN — SUCRALFATE 1 GRAM(S): 1 SUSPENSION ORAL at 23:14

## 2025-01-08 RX ADMIN — SUCRALFATE 1 GRAM(S): 1 SUSPENSION ORAL at 00:28

## 2025-01-08 RX ADMIN — PANTOPRAZOLE 40 MILLIGRAM(S): 20 TABLET, DELAYED RELEASE ORAL at 17:24

## 2025-01-08 RX ADMIN — Medication 0.5 MILLIGRAM(S): at 16:24

## 2025-01-08 RX ADMIN — Medication 25 MILLIGRAM(S): at 09:35

## 2025-01-08 RX ADMIN — MORPHINE SULFATE 4 MILLIGRAM(S): 60 TABLET, FILM COATED, EXTENDED RELEASE ORAL at 02:47

## 2025-01-08 RX ADMIN — CALCIUM ACETATE 667 MILLIGRAM(S): 667 CAPSULE ORAL at 11:48

## 2025-01-08 RX ADMIN — MORPHINE SULFATE 4 MILLIGRAM(S): 60 TABLET, FILM COATED, EXTENDED RELEASE ORAL at 09:09

## 2025-01-08 RX ADMIN — ONDANSETRON 4 MILLIGRAM(S): 4 TABLET, ORALLY DISINTEGRATING ORAL at 21:23

## 2025-01-08 RX ADMIN — MORPHINE SULFATE 4 MILLIGRAM(S): 60 TABLET, FILM COATED, EXTENDED RELEASE ORAL at 10:30

## 2025-01-08 RX ADMIN — PANTOPRAZOLE 40 MILLIGRAM(S): 20 TABLET, DELAYED RELEASE ORAL at 06:05

## 2025-01-08 RX ADMIN — PREGABALIN CAPSULES, CV 50 MILLIGRAM(S): 225 CAPSULE ORAL at 21:24

## 2025-01-08 RX ADMIN — ONDANSETRON 4 MILLIGRAM(S): 4 TABLET, ORALLY DISINTEGRATING ORAL at 13:56

## 2025-01-08 NOTE — PROGRESS NOTE ADULT - ASSESSMENT
45 y/o female with a PMHX of PE (on warfarin), Lupus,  RA, gastritis and ectopic pregnancy s/p L salpingectomy  who presents to the hospital for evaluation of a 1 month history of intractable abdominal pain. Patient reports that the pain started in early December and has recently been associated with vomiting NBNB emesis and decreased PO intake. She denies any fever, chills, headache, shortness of breath, diarrhea or dysuria .Of note patient has been taking low dose prednisone for the past 7 years after her last lupus flare.     #Suspected gastritis 2/2 to chronic steroid use   #chronic abdominal pain  #chest pain?  -patient has been taking low dose prednisone for 7 years (currently on 5mg daily)  -epigastric pain>1 month in duration not associated with meals or viral prodrome   - GI consult for possible EGD  -PPI QD   -c/w carafate   -patient endorses on 1/8 that pain is in chest & not abddomen, could be epigastric, no tendreness on exam. ECG & trops -ve  -will dc morphine & c/w home percocet & ativan for anxiety  -switch protonics to po bid, f/u GI recs    #+BHCG  #Hx of ectopic pregnancy   -patient is sexually active   -low suspicion for repeat ectopic pregnancy at this time given that level is borderline elevated  -patient endorses having last menstrual cycle approximately 3 months   -will f./u repeat BHCG    #Hx of PE  -daily INR   -warfarin dosing as per pharmacy   -can consider transition to eliquis as patient was never started on it       #Hx of Lupus  #Hx of RA  -follows with Dr. Sim Sharpe  -no reported recent flairs   -on low dose prednisone (5mg daily)   -patient was suppose to be on taper but instead just takes 5mg QD at her own discretion   -hold prednisone for now    #MISC  DVT prophylaxis: warfarin   GI prophylaxis: Pantoprazole   Diet: Regular   Activity: IAT    45 y/o female with a PMHX of PE (on warfarin), Lupus,  RA, gastritis and ectopic pregnancy s/p L salpingectomy  who presents to the hospital for evaluation of a 1 month history of intractable abdominal pain. Patient reports that the pain started in early December and has recently been associated with vomiting NBNB emesis and decreased PO intake. She denies any fever, chills, headache, shortness of breath, diarrhea or dysuria .Of note patient has been taking low dose prednisone for the past 7 years after her last lupus flare.     #Suspected gastritis 2/2 to chronic steroid use   #chronic abdominal pain  #chest pain?  -patient has been taking low dose prednisone for 7 years (currently on 5mg daily)  -epigastric pain>1 month in duration not associated with meals or viral prodrome   - GI consult for possible EGD  -PPI QD   -c/w carafate   -patient endorses on 1/8 that pain is in chest & not abddomen, could be epigastric, no tendreness on exam. ECG & trops -ve  -will dc morphine & c/w home percocet & ativan/lyrica  for anxiety  -switch protonics to po bid, f/u GI recs    #+BHCG  #Hx of ectopic pregnancy   -patient is sexually active   -low suspicion for repeat ectopic pregnancy at this time given that level is borderline elevated  -patient endorses having last menstrual cycle approximately 3 months   -will f./u repeat BHCG    #Hx of PE  -daily INR   -warfarin dosing as per pharmacy   -can consider transition to eliquis as patient was never started on it       #Hx of Lupus  #Hx of RA  -follows with Dr. Sim Sharpe  -no reported recent flairs   -on low dose prednisone (5mg daily)   -patient was suppose to be on taper but instead just takes 5mg QD at her own discretion   -hold prednisone for now    #MISC  DVT prophylaxis: warfarin   GI prophylaxis: Pantoprazole   Diet: Regular   Activity: IAT

## 2025-01-08 NOTE — PATIENT PROFILE ADULT - FALL HARM RISK - UNIVERSAL INTERVENTIONS
Bed in lowest position, wheels locked, appropriate side rails in place/Call bell, personal items and telephone in reach/Instruct patient to call for assistance before getting out of bed or chair/Non-slip footwear when patient is out of bed/[place] to call system/Physically safe environment - no spills, clutter or unnecessary equipment/Purposeful Proactive Rounding/Room/bathroom lighting operational, light cord in reach

## 2025-01-08 NOTE — PROGRESS NOTE ADULT - SUBJECTIVE AND OBJECTIVE BOX
SUBJECTIVE / OVERNIGHT EVENTS  Patient slept well overnight. Complaining of pain & discomfort in chest area. No abd pain or nausea    MEDICATIONS  calcium acetate 667 milliGRAM(s) Oral three times a day with meals  pantoprazole    Tablet 40 milliGRAM(s) Oral every 12 hours  pregabalin 50 milliGRAM(s) Oral three times a day  sodium chloride 0.9%. 1000 milliLiter(s) IV Continuous <Continuous>  sucralfate 1 Gram(s) Oral every 6 hours    ALPRAZolam 0.5 milliGRAM(s) Oral every 12 hours PRN Anxiety  ondansetron Injectable 4 milliGRAM(s) IV Push every 8 hours PRN Nausea and/or Vomiting  oxycodone    5 mG/acetaminophen 325 mG 1 Tablet(s) Oral every 6 hours PRN Moderate Pain (4 - 6)  oxycodone    5 mG/acetaminophen 325 mG 2 Tablet(s) Oral every 8 hours PRN Severe Pain (7 - 10)    VITALS /  EXAM    T(C): 36.4 (01-08-25 @ 09:25), Max: 36.4 (01-07-25 @ 15:44)  HR: 62 (01-08-25 @ 09:25) (62 - 80)  BP: 116/81 (01-08-25 @ 09:25) (107/72 - 124/83)  RR: 18 (01-08-25 @ 09:25) (18 - 18)  SpO2: 100% (01-08-25 @ 09:25) (98% - 100%)    GENERAL: NAD, well-developed  CHEST/LUNG: Clear to auscultation bilaterally; No wheezes, rales or rhonchi  HEART: Regular rate and rhythm; No murmurs, rubs, or gallops  ABDOMEN: Soft, Nontender, Nondistended; Bowel sounds present, no masses.  EXTREMITIES:  2+ Peripheral Pulses, No clubbing, cyanosis, or edema    I's & O's     LABS             13.4   3.70  )-----------( 219      ( 01-08-25 @ 08:01 )             41.6     143  |  108  |  10  -------------------------<  89   01-08-25 @ 08:01  3.8  |  26  |  0.7    Ca      8.5     01-08-25 @ 08:01  Mg     1.8     01-08-25 @ 08:01    TPro  6.1  /  Alb  4.0  /  TBili  0.4  /  DBili  x   /  AST  17  /  ALT  13  /  AlkPhos  50  /  GGT  x     01-08-25 @ 08:01    PT/INR - ( 01-08-25 @ 08:01 )   PT: >40.00 sec[H];   INR: 3.78 ratio[H]  PTT - ( 01-07-25 @ 02:06 )  PTT:57.4 sec    Troponin T, High Sensitivity Result: <6 ng/L (01-08-25 @ 08:01)  Troponin T, High Sensitivity Result: <6 ng/L (01-07-25 @ 11:22)  Troponin T, High Sensitivity Result: <6 ng/L (01-07-25 @ 02:06)      Urinalysis Basic - ( 08 Jan 2025 08:01 )    Color: x / Appearance: x / SG: x / pH: x  Gluc: 89 mg/dL / Ketone: x  / Bili: x / Urobili: x   Blood: x / Protein: x / Nitrite: x   Leuk Esterase: x / RBC: x / WBC x   Sq Epi: x / Non Sq Epi: x / Bacteria: x      MICRO / IMAGING / CARDIOLOGY  Telemetry: Reviewed   EKG: Reviewed    CULTURES    Urinalysis with Rflx Culture (collected 01-07-25 @ 07:06)      IMAGING  PACS Image:  (01-07-25 @ 05:34)  PACS Image:  (01-07-25 @ 05:34)  PACS Image:  (01-07-25 @ 02:57)    CARDIOLOGY

## 2025-01-08 NOTE — CONSULT NOTE ADULT - ATTENDING COMMENTS
Agree with the above.  Patient evaluated for a 1 month history of abdominal pain and vomiting.  She denies daily THC use.   Beta-hCG indeterminate and she has a history of ectopic pregnancy.  Her last menstrual cycle was around 3 months ago.     CT is unremarkable for intra-abdominal pathology however, would get gynecology clearance to rule out pregnancy (ectopic or nonectopic). Symptoms are less likely due to adrenal insufficiency as patient has been on the same dose of prednisone for at least 1 year without discontinuation.     We will follow.   Rest of recommendations per the note above.    Time-based billing (NON-critical care).   75 minutes spent on total encounter; more than 50% of the visit was spent counseling and / or coordinating care by the attending physician.  The necessity of the time spent during the encounter on this date of service was due to: Coordination of care.

## 2025-01-08 NOTE — CHART NOTE - NSCHARTNOTEFT_GEN_A_CORE
Appt scheduled on 1/13 [place] for an EGD with RENUKA [place] 1/8 (Outpatient GI Program) Appt scheduled on 1/13 [place] for an EGD with Dr. Desai, [facility] 1/8 /  Pt has had procedure performed as an inpatient, [facility] 1/9 (Outpatient GI Program)

## 2025-01-09 ENCOUNTER — TRANSCRIPTION ENCOUNTER (OUTPATIENT)
Age: 45
End: 2025-01-09

## 2025-01-09 ENCOUNTER — RESULT REVIEW (OUTPATIENT)
Age: 45
End: 2025-01-09

## 2025-01-09 LAB
INR BLD: 2.63 RATIO — HIGH (ref 0.65–1.3)
PROTHROM AB SERPL-ACNC: 31.6 SEC — HIGH (ref 9.95–12.87)

## 2025-01-09 PROCEDURE — 88305 TISSUE EXAM BY PATHOLOGIST: CPT | Mod: 26

## 2025-01-09 PROCEDURE — 99233 SBSQ HOSP IP/OBS HIGH 50: CPT

## 2025-01-09 PROCEDURE — 88312 SPECIAL STAINS GROUP 1: CPT | Mod: 26

## 2025-01-09 PROCEDURE — 43239 EGD BIOPSY SINGLE/MULTIPLE: CPT

## 2025-01-09 RX ORDER — PANTOPRAZOLE 20 MG/1
40 TABLET, DELAYED RELEASE ORAL
Refills: 0 | Status: DISCONTINUED | OUTPATIENT
Start: 2025-01-09 | End: 2025-01-09

## 2025-01-09 RX ORDER — WARFARIN SODIUM 2 MG/1
7.5 TABLET ORAL ONCE
Refills: 0 | Status: COMPLETED | OUTPATIENT
Start: 2025-01-09 | End: 2025-01-09

## 2025-01-09 RX ORDER — FOLIC ACID 1 MG
1 TABLET ORAL
Refills: 0 | DISCHARGE

## 2025-01-09 RX ORDER — PREGABALIN CAPSULES, CV 225 MG/1
75 CAPSULE ORAL
Refills: 0 | Status: DISCONTINUED | OUTPATIENT
Start: 2025-01-09 | End: 2025-01-16

## 2025-01-09 RX ORDER — ACETAMINOPHEN, DIPHENHYDRAMINE HCL, PHENYLEPHRINE HCL 325; 25; 5 MG/1; MG/1; MG/1
1 TABLET ORAL
Refills: 0 | DISCHARGE

## 2025-01-09 RX ORDER — MORPHINE SULFATE 60 MG/1
1 TABLET, FILM COATED, EXTENDED RELEASE ORAL ONCE
Refills: 0 | Status: DISCONTINUED | OUTPATIENT
Start: 2025-01-09 | End: 2025-01-09

## 2025-01-09 RX ORDER — PREGABALIN CAPSULES, CV 225 MG/1
1 CAPSULE ORAL
Refills: 0 | DISCHARGE

## 2025-01-09 RX ORDER — PANTOPRAZOLE 20 MG/1
40 TABLET, DELAYED RELEASE ORAL
Refills: 0 | Status: DISCONTINUED | OUTPATIENT
Start: 2025-01-09 | End: 2025-01-19

## 2025-01-09 RX ORDER — FOLIC ACID 1 MG
1 TABLET ORAL DAILY
Refills: 0 | Status: DISCONTINUED | OUTPATIENT
Start: 2025-01-09 | End: 2025-01-19

## 2025-01-09 RX ORDER — DULOXETINE 20 MG/1
60 CAPSULE, DELAYED RELEASE ORAL DAILY
Refills: 0 | Status: DISCONTINUED | OUTPATIENT
Start: 2025-01-09 | End: 2025-01-13

## 2025-01-09 RX ORDER — PREDNISONE 5 MG/1
5 TABLET ORAL ONCE
Refills: 0 | Status: COMPLETED | OUTPATIENT
Start: 2025-01-09 | End: 2025-01-09

## 2025-01-09 RX ORDER — OXYCODONE HYDROCHLORIDE AND ACETAMINOPHEN 5; 325 MG/1; MG/1
1 TABLET ORAL
Refills: 0 | DISCHARGE

## 2025-01-09 RX ADMIN — CALCIUM ACETATE 667 MILLIGRAM(S): 667 CAPSULE ORAL at 17:07

## 2025-01-09 RX ADMIN — MORPHINE SULFATE 1 MILLIGRAM(S): 60 TABLET, FILM COATED, EXTENDED RELEASE ORAL at 07:14

## 2025-01-09 RX ADMIN — PREGABALIN CAPSULES, CV 50 MILLIGRAM(S): 225 CAPSULE ORAL at 13:34

## 2025-01-09 RX ADMIN — WARFARIN SODIUM 7.5 MILLIGRAM(S): 2 TABLET ORAL at 21:11

## 2025-01-09 RX ADMIN — ONDANSETRON 4 MILLIGRAM(S): 4 TABLET, ORALLY DISINTEGRATING ORAL at 23:21

## 2025-01-09 RX ADMIN — PREDNISONE 5 MILLIGRAM(S): 5 TABLET ORAL at 17:08

## 2025-01-09 RX ADMIN — MORPHINE SULFATE 1 MILLIGRAM(S): 60 TABLET, FILM COATED, EXTENDED RELEASE ORAL at 02:15

## 2025-01-09 RX ADMIN — SUCRALFATE 1 GRAM(S): 1 SUSPENSION ORAL at 23:03

## 2025-01-09 RX ADMIN — SUCRALFATE 1 GRAM(S): 1 SUSPENSION ORAL at 05:59

## 2025-01-09 RX ADMIN — MORPHINE SULFATE 1 MILLIGRAM(S): 60 TABLET, FILM COATED, EXTENDED RELEASE ORAL at 17:07

## 2025-01-09 RX ADMIN — PREGABALIN CAPSULES, CV 50 MILLIGRAM(S): 225 CAPSULE ORAL at 05:59

## 2025-01-09 RX ADMIN — ONDANSETRON 4 MILLIGRAM(S): 4 TABLET, ORALLY DISINTEGRATING ORAL at 05:59

## 2025-01-09 RX ADMIN — SUCRALFATE 1 GRAM(S): 1 SUSPENSION ORAL at 17:07

## 2025-01-09 RX ADMIN — PANTOPRAZOLE 40 MILLIGRAM(S): 20 TABLET, DELAYED RELEASE ORAL at 17:07

## 2025-01-09 RX ADMIN — Medication 100 MILLILITER(S): at 18:49

## 2025-01-09 RX ADMIN — PANTOPRAZOLE 40 MILLIGRAM(S): 20 TABLET, DELAYED RELEASE ORAL at 05:59

## 2025-01-09 RX ADMIN — SUCRALFATE 1 GRAM(S): 1 SUSPENSION ORAL at 13:33

## 2025-01-09 NOTE — PROGRESS NOTE ADULT - ASSESSMENT
43 y/o female with a PMHX of PE (on warfarin), Lupus,  RA, gastritis and ectopic pregnancy s/p L salpingectomy  who presents to the hospital for evaluation of a 1 month history of intractable abdominal pain. Patient reports that the pain started in early December and has recently been associated with vomiting NBNB emesis and decreased PO intake. She denies any fever, chills, headache, shortness of breath, diarrhea or dysuria .Of note patient has been taking low dose prednisone for the past 7 years after her last lupus flare.     #Suspected gastritis 2/2 to chronic steroid use   #chronic abdominal pain  #chest pain  -patient has been taking low dose prednisone for 7 years (currently on 5mg daily)  -epigastric pain>1 month in duration not associated with meals or viral prodrome   -c/w carafate   -patient endorses on 1/8 that pain is in chest & not abddomen, could be epigastric, no tendreness on exam. ECG & trops -ve  -c/w home percocet & ativan/lyrica  for anxiety  -cw Protonix 40mg IV BID  -Per GI, planned for for EGD today     #+BHCG  #Hx of ectopic pregnancy   -patient is sexually active   -low suspicion for repeat ectopic pregnancy at this time given that level is borderline elevated  -patient endorses having last menstrual cycle approximately 3 months   -will f./u repeat BHCG    #Hx of PE  -daily INR   -warfarin dosing as per pharmacy   -can consider transition to eliquis as patient was never started on it     #Hx of Lupus  #Hx of RA  -follows with Dr. Sim Sharpe  -no reported recent flairs   -restarted on prednisone 5mg PO qd because patient reports having been on it consistently for 7 years. Risk of Adrenal crisis.     #MISC  DVT prophylaxis: warfarin   GI prophylaxis: Pantoprazole   Diet: Regular   Activity: IAT

## 2025-01-09 NOTE — PROGRESS NOTE ADULT - SUBJECTIVE AND OBJECTIVE BOX
DAMIAN PRESLEY  44y Female    CHIEF COMPLAINT:    Patient is a 44y old  Female who presents with a chief complaint of Intractable abdominal pain (2025 13:59)      INTERVAL HPI/OVERNIGHT EVENTS:    Patient seen and examined.    ROS: All other systems are negative.    Vital Signs:    T(F): 97.5 (25 @ 04:38), Max: 97.9 (25 @ 20:27)  HR: 58 (25 @ 04:38) (55 - 76)  BP: 108/72 (25 @ 04:38) (108/72 - 124/83)  RR: 18 (25 @ 04:38) (18 - 18)  SpO2: 100% (25 @ 04:38) (98% - 100%)  I&O's Summary    2025 07:01  -  2025 07:00  --------------------------------------------------------  IN: 50 mL / OUT: 950 mL / NET: -900 mL      Daily     Daily Weight in k (2025 02:46)  CAPILLARY BLOOD GLUCOSE          PHYSICAL EXAM:    GENERAL:  NAD  SKIN: No rashes or lesions  HENT: Atraumatic. Normocephalic. PERRL. Moist membranes.  NECK: Supple, No JVD. No lymphadenopathy.  PULMONARY: CTA B/L. No wheezing. No rales  CVS: Normal S1, S2. Rate and Rhythm are regular. No murmurs.  ABDOMEN/GI: Soft, Nontender, Nondistended; BS present  EXTREMITIES: Peripheral pulses intact. No edema B/L LE.  NEUROLOGIC:  No motor or sensory deficit.  PSYCH: Alert & oriented x 3    Consultant(s) Notes Reviewed:  [x ] YES  [ ] NO  Care Discussed with Consultants/Other Providers [ x] YES  [ ] NO    EKG reviewed  Telemetry reviewed    LABS:                        13.4   3.70  )-----------( 219      ( 2025 08:01 )             41.6     -08    143  |  108  |  10  ----------------------------<  89  3.8   |  26  |  0.7    Ca    8.5      2025 08:01  Mg     1.8     -08    TPro  6.1  /  Alb  4.0  /  TBili  0.4  /  DBili  x   /  AST  17  /  ALT  13  /  AlkPhos  50  01-08    PT/INR - ( 2025 08:01 )   PT: >40.00 sec;   INR: 3.78 ratio                 Urinalysis with Rflx Culture (collected 2025 07:06)    Culture - Urine (collected 2025 07:06)  Source: Clean Catch None  Final Report (2025 12:23):    <10,000 CFU/mL Normal Urogenital Cyn        RADIOLOGY & ADDITIONAL TESTS:    < from: CT Angio Chest PE Protocol w/ IV Cont (25 @ 05:34) >    IMPRESSION:  1.  No evidence of acute pulmonary embolism.  2.  No evidence of acute thoracic or abdominal pelvic pathology.      < end of copied text >  < from: CT Abdomen and Pelvis w/ IV Cont (25 @ 05:34) >    IMPRESSION:  1.  No evidence of acute pulmonary embolism.  2.  No evidence of acute thoracic or abdominal pelvic pathology.    < end of copied text >    Imaging or report Personally Reviewed:  [x ] YES  [ ] NO    Medications:  Standing  calcium acetate 667 milliGRAM(s) Oral three times a day with meals  pantoprazole    Tablet 40 milliGRAM(s) Oral every 12 hours  pregabalin 50 milliGRAM(s) Oral three times a day  sodium chloride 0.9%. 1000 milliLiter(s) IV Continuous <Continuous>  sucralfate 1 Gram(s) Oral every 6 hours    PRN Meds  ALPRAZolam 0.5 milliGRAM(s) Oral every 12 hours PRN  ondansetron Injectable 4 milliGRAM(s) IV Push every 8 hours PRN  oxycodone    5 mG/acetaminophen 325 mG 1 Tablet(s) Oral every 6 hours PRN  oxycodone    5 mG/acetaminophen 325 mG 2 Tablet(s) Oral every 8 hours PRN      Case discussed with resident    Care discussed with pt/family           DAMIAN PRESLEY  44y Female    CHIEF COMPLAINT:    Patient is a 44y old  Female who presents with a chief complaint of Intractable abdominal pain (2025 13:59)      INTERVAL HPI/OVERNIGHT EVENTS:    Patient seen and examined. C/O stabbing type upper abdominal and retrosternal pain for the last one month. Pt states that the pain radiates to back. Also c/o N/V    ROS: All other systems are negative.    Vital Signs:    T(F): 97.5 (25 @ 04:38), Max: 97.9 (25 @ 20:27)  HR: 58 (25 @ 04:38) (55 - 76)  BP: 108/72 (25 @ 04:38) (108/72 - 124/83)  RR: 18 (25 @ 04:38) (18 - 18)  SpO2: 100% (25 @ 04:38) (98% - 100%)  I&O's Summary    2025 07:01  -  2025 07:00  --------------------------------------------------------  IN: 50 mL / OUT: 950 mL / NET: -900 mL      Daily     Daily Weight in k (2025 02:46)  CAPILLARY BLOOD GLUCOSE          PHYSICAL EXAM:    GENERAL:  NAD  SKIN: No rashes or lesions  HENT: Atraumatic. Normocephalic. PERRL. Moist membranes.  NECK: Supple, No JVD. No lymphadenopathy.  PULMONARY: CTA B/L. No wheezing. No rales  CVS: Normal S1, S2. Rate and Rhythm are regular. No murmurs.  ABDOMEN/GI: Soft, Mild tenderness in the epigastrium, Nondistended; BS present  EXTREMITIES: Peripheral pulses intact. No edema B/L LE.  NEUROLOGIC:  No motor or sensory deficit.  PSYCH: Alert & oriented x 3    Consultant(s) Notes Reviewed:  [x ] YES  [ ] NO  Care Discussed with Consultants/Other Providers [ x] YES  [ ] NO    EKG reviewed  Telemetry reviewed    LABS:                        13.4   3.70  )-----------( 219      ( 2025 08:01 )             41.6     01-08    143  |  108  |  10  ----------------------------<  89  3.8   |  26  |  0.7    Ca    8.5      2025 08:01  Mg     1.8     -08    TPro  6.1  /  Alb  4.0  /  TBili  0.4  /  DBili  x   /  AST  17  /  ALT  13  /  AlkPhos  50  -08    PT/INR - ( 2025 08:01 )   PT: >40.00 sec;   INR: 3.78 ratio                 Urinalysis with Rflx Culture (collected 2025 07:06)    Culture - Urine (collected 2025 07:06)  Source: Clean Catch None  Final Report (2025 12:23):    <10,000 CFU/mL Normal Urogenital Cyn        RADIOLOGY & ADDITIONAL TESTS:    < from: CT Angio Chest PE Protocol w/ IV Cont (25 @ 05:34) >    IMPRESSION:  1.  No evidence of acute pulmonary embolism.  2.  No evidence of acute thoracic or abdominal pelvic pathology.      < end of copied text >  < from: CT Abdomen and Pelvis w/ IV Cont (25 @ 05:34) >    IMPRESSION:  1.  No evidence of acute pulmonary embolism.  2.  No evidence of acute thoracic or abdominal pelvic pathology.    < end of copied text >    Imaging or report Personally Reviewed:  [x ] YES  [ ] NO    Medications:  Standing  calcium acetate 667 milliGRAM(s) Oral three times a day with meals  pantoprazole    Tablet 40 milliGRAM(s) Oral every 12 hours  pregabalin 50 milliGRAM(s) Oral three times a day  sodium chloride 0.9%. 1000 milliLiter(s) IV Continuous <Continuous>  sucralfate 1 Gram(s) Oral every 6 hours    PRN Meds  ALPRAZolam 0.5 milliGRAM(s) Oral every 12 hours PRN  ondansetron Injectable 4 milliGRAM(s) IV Push every 8 hours PRN  oxycodone    5 mG/acetaminophen 325 mG 1 Tablet(s) Oral every 6 hours PRN  oxycodone    5 mG/acetaminophen 325 mG 2 Tablet(s) Oral every 8 hours PRN      Case discussed with resident    Care discussed with pt/family

## 2025-01-09 NOTE — PRE-ANESTHESIA EVALUATION ADULT - NSANTHOSAYNRD_GEN_A_CORE
No. NILESH screening performed.  STOP BANG Legend: 0-2 = LOW Risk; 3-4 = INTERMEDIATE Risk; 5-8 = HIGH Risk

## 2025-01-09 NOTE — PROGRESS NOTE ADULT - ASSESSMENT
43 y/o female with a PMHX of PE (on warfarin), Lupus,  RA, gastritis and ectopic pregnancy s/p L salpingectomy  who presents to the hospital for evaluation of a 1 month history of intractable abdominal pain. Patient reports that the pain started in early December and has recently been associated with vomiting NBNB emesis and decreased PO intake.     Abdominal pain / Nausea / Vomiting  H/O chronic PE  SLE / RA                 PLAN:    ·	Tele reviewed by me  ·	 45 y/o female with a PMHX of PE (on warfarin), Lupus,  RA, gastritis and ectopic pregnancy s/p L salpingectomy  who presents to the hospital for evaluation of a 1 month history of intractable abdominal pain. Patient reports that the pain started in early December and has recently been associated with vomiting NBNB emesis and decreased PO intake.     Abdominal pain / Nausea / Vomiting  H/O chronic PE  SLE / RA                 PLAN:    ·	Tele reviewed by me. No events  ·	EKG on admission: NSR 74/min (interpreted by me)  ·	Troponin: <6--> <6  ·	CTA chest is negative for PE  ·	CT abd/pelvis is unremarkable  ·	Pt is on Prednisone for a long time. Restart her home dose of Prednisone 5 mg po daily  ·	GI eval noted. Scheduled for EGD today  ·	NPO  for now  ·	Cont Protonix and Sucralfate.     Progress Note Handoff    Pending (specify):  Consults_________, Tests________, Test Results_______, Other_EGD today________  Family discussion:  Disposition: Home___/SNF___/Other________/Unknown at this time________    Ankit Francois MD  Spectra: [phone]

## 2025-01-09 NOTE — PROGRESS NOTE ADULT - SUBJECTIVE AND OBJECTIVE BOX
SUBJECTIVE/OVERNIGHT EVENTS  Today is hospital day 2d. This morning patient was seen and examined at bedside, resting comfortably in bed. No acute or major events overnight.    CODE STATUS: FULL     MEDICATIONS  STANDING MEDICATIONS  calcium acetate 667 milliGRAM(s) Oral three times a day with meals  pantoprazole    Tablet 40 milliGRAM(s) Oral every 12 hours  predniSONE   Tablet 5 milliGRAM(s) Oral once  pregabalin 50 milliGRAM(s) Oral three times a day  sodium chloride 0.9%. 1000 milliLiter(s) IV Continuous <Continuous>  sucralfate 1 Gram(s) Oral every 6 hours    PRN MEDICATIONS  ALPRAZolam 0.5 milliGRAM(s) Oral every 12 hours PRN  ondansetron Injectable 4 milliGRAM(s) IV Push every 8 hours PRN  oxycodone    5 mG/acetaminophen 325 mG 1 Tablet(s) Oral every 6 hours PRN  oxycodone    5 mG/acetaminophen 325 mG 2 Tablet(s) Oral every 8 hours PRN    VITALS  T(F): 97.1 (01-09-25 @ 11:08), Max: 97.9 (01-08-25 @ 20:27)  HR: 59 (01-09-25 @ 11:08) (55 - 62)  BP: 133/91 (01-09-25 @ 11:08) (108/72 - 133/91)  RR: 18 (01-09-25 @ 11:08) (18 - 18)  SpO2: 99% (01-09-25 @ 11:08) (99% - 100%)    PHYSICAL EXAM  GENERAL  (x ) NAD, lying in bed comfortably     (  ) obtunded     (  ) lethargic     (  ) somnolent    HEAD  (x  ) Atraumatic     (  ) hematoma     (  ) laceration (specify location:       )     NECK  ( x ) Supple     (  ) neck stiffness     (  ) nuchal rigidity     (  )  no JVD     (  ) JVD present ( -- cm)    HEART  Rate -->  (x  ) normal rate    (  ) bradycardic    (  ) tachycardic  Rhythm -->  ( x ) regular    (  ) regularly irregular    (  ) irregularly irregular  Murmurs -->  (  ) normal s1/s2    (  ) systolic murmur    (  ) diastolic murmur    (  ) continuous murmur     (  ) S3 present    (  ) S4 present    LUNGS  ( x )Unlabored respirations     (  ) tachypnea  (  ) B/L air entry     (  ) decreased breath sounds in:  (location     )    (  ) no adventitious sound     (  ) crackles     (  ) wheezing      (  ) rhonchi      (specify location:       )  (  ) chest wall tenderness (specify location:       )    ABDOMEN  (  ) Soft     (  ) tense   |   ( x ) nondistended     (  ) distended   |   (  ) +BS     (  ) hypoactive bowel sounds     (  ) hyperactive bowel sounds  (  ) nontender     (  ) RUQ tenderness     (  ) RLQ tenderness     (  ) LLQ tenderness     ( x ) epigastric tenderness     (x  ) diffuse tenderness  (  ) Splenomegaly      (  ) Hepatomegaly      (  ) Jaundice     (  ) ecchymosis     EXTREMITIES  ( x ) Normal     (  ) Rash     (  ) ecchymosis     (  ) varicose veins      (  ) pitting edema     (  ) non-pitting edema   (  ) ulceration     (  ) gangrene:     (location:     )    NERVOUS SYSTEM  ( x ) A&Ox3     (  ) confused     (  ) lethargic  CN II-XII:     (  ) Intact     (  ) focal deficits  (Specify:     )   Upper extremities:     (  ) strength X/5     (  ) focal deficit (specify:    )  Lower extremities:     (  ) strength  X/5    (  ) focal deficit (specify:    )    SKIN  ( x ) No rashes or lesions     (  ) maculopapular rash     (  ) pustules     (  ) vesicles     (  ) ulcer     (  ) ecchymosis     (specify location:     )    LABS             13.4   3.70  )-----------( 219      ( 01-08-25 @ 08:01 )             41.6     143  |  108  |  10  -------------------------<  89   01-08-25 @ 08:01  3.8  |  26  |  0.7    Ca      8.5     01-08-25 @ 08:01  Mg     1.8     01-08-25 @ 08:01    TPro  6.1  /  Alb  4.0  /  TBili  0.4  /  DBili  x   /  AST  17  /  ALT  13  /  AlkPhos  50  /  GGT  x     01-08-25 @ 08:01    PT/INR - ( 01-09-25 @ 07:20 )   PT: 31.60 sec[H];   INR: 2.63 ratio[H]    Troponin T, High Sensitivity Result: <6 ng/L (01-08-25 @ 08:01)  Troponin T, High Sensitivity Result: <6 ng/L (01-07-25 @ 11:22)  Troponin T, High Sensitivity Result: <6 ng/L (01-07-25 @ 02:06)    Urinalysis Basic - ( 08 Jan 2025 08:01 )    Color: x / Appearance: x / SG: x / pH: x  Gluc: 89 mg/dL / Ketone: x  / Bili: x / Urobili: x   Blood: x / Protein: x / Nitrite: x   Leuk Esterase: x / RBC: x / WBC x   Sq Epi: x / Non Sq Epi: x / Bacteria: x          Urinalysis with Rflx Culture (collected 07 Jan 2025 07:06)    Culture - Urine (collected 07 Jan 2025 07:06)  Source: Clean Catch None  Final Report (08 Jan 2025 12:23):    <10,000 CFU/mL Normal Urogenital Cyn      IMAGING

## 2025-01-09 NOTE — PHARMACOTHERAPY INTERVENTION NOTE - COMMENTS
44yFemale    Indication: History of PE  INR Goal: 2-3  Home Dose: warfarin 10 mg po daily  Bridge Therapy: not indicated    Current Medications:  ALPRAZolam 0.5 milliGRAM(s) Oral every 12 hours PRN  calcium acetate 667 milliGRAM(s) Oral three times a day with meals  ondansetron Injectable 4 milliGRAM(s) IV Push every 8 hours PRN  oxycodone    5 mG/acetaminophen 325 mG 1 Tablet(s) Oral every 6 hours PRN  oxycodone    5 mG/acetaminophen 325 mG 2 Tablet(s) Oral every 8 hours PRN  pantoprazole  Injectable 40 milliGRAM(s) IV Push two times a day  predniSONE   Tablet 5 milliGRAM(s) Oral once  pregabalin 50 milliGRAM(s) Oral three times a day  sodium chloride 0.9%. 1000 milliLiter(s) IV Continuous <Continuous>  sucralfate 1 Gram(s) Oral every 6 hours      hemoglobin 13.4 g/dL (01-08-25 @ 08:01)    hematocrit 41.6 % (01-08-25 @ 08:01)    PLT: 219 K/uL (01-08-25 @ 08:01)    GFR:108 mL/min/1.73m2 (01-08-25 @ 08:01)      Drug Interactions: none at this time; steroids may increase the anticoagulant effects of warfarin    INR trend  4.65 ratio (01-07-25 @ 02:06)  3.78 ratio (01-08-25 @ 08:01)  2.63 ratio (01-09-25 @ 07:20)      Warfarin administration history:        1. INR today is:                    [   ] below goal                                             [ x  ] at goal                                            [   ] above goal         2. Recommend Warfarin   7.5 mg PO x 1     3. Obtain INR tomorrow AM  Patient with a history of PE in the past, also with lupus. Patient likely on warfarin due to hypercoagulable state in setting of lupus, unclear what her past workup has been. I spoke to the patient regarding her home warfarin dose she states she has varied between 5 and 10 mg po daily. She also told me she has not followed with an INR check in some time and does not go to a warfarin clinic at this time. Her INR on admission was = 4.65 unclear how long her INR has been supratherapeutic. No warfarin was given on 1/7 or 1/8. INR is now at goal = 2.63. Would recommend giving a slightly lower dose of warfarin 7.5 mg po x 1 today and continue with daily INR monitoring to aid in dosing. Consider setting up visits with a warfarin clinic for further management.

## 2025-01-09 NOTE — CHART NOTE - NSCHARTNOTEFT_GEN_A_CORE
PACU ANESTHESIA ADMISSION NOTE      Procedure: EGD with biopsies  Post op diagnosis:  gastritis    ____  Intubated  TV:______       Rate: ______      FiO2: ______    __x__  Patent Airway    ____  Full return of protective reflexes    ____  Full recovery from anesthesia / back to baseline     Vitals:   T:   97.1 F        R:   14               BP:    116/72              Sat:   100%                P: 73      Mental Status:  __x__ Awake   _____ Alert   _____ Drowsy   _____ Sedated    Nausea/Vomiting:  ____ NO  ______Yes,   See Post - Op Orders          Pain Scale (0-10):  _____    Treatment: ____ None    ___x_ See Post - Op/PCA Orders    Post - Operative Fluids:   ____ Oral   __x__ See Post - Op Orders    Plan: Discharge:   ____Home       ___x__Floor     _____Critical Care    _____  Other:_________________    Comments: Pt tolerated procedure well, no anesthesia related complications. Care of pt endorsed to PACU, report given to PACU RN. Discharge when criteria are met.

## 2025-01-09 NOTE — PHARMACOTHERAPY INTERVENTION NOTE - COMMENTS
Spoke to patient regarding home medication list. Patient was able to recall the names of her medications but was unclear of some doses. Confirmed medications and doses with patients pharmacy - [facility]. Changed dose of pregabalin from 50 mg po TID to 75 mg PO BID (also confirmed via iSTOP). Per patient she takes Percocet usually 1-2 times daily as needed, she also stated she takes alprazolam as needed however no recent fill history per pharmacy or via iSTOP, will leave as once daily as needed for now. She also mentioned taking zolpidem however no fill history for this medication. Added duloxetine 60 mg po daily, folic acid 1 mg po daily, melatonin 3 mg po qhs prn insomnia and docusate 100 mg po daily prn constipation.    Home Medications:  alprazolam 0.5 mg po daily prn anxiety  docusate sodium 100 mg oral capsule: 1 cap(s) orally once a day as needed for  constipation (09 Jan 2025 15:29)  DULoxetine 60 mg oral delayed release capsule: 1 cap(s) orally once a day (09 Jan 2025 15:27)  folic acid: 1 milligram(s) orally once a day (09 Jan 2025 15:27)  melatonin 3 mg oral tablet: 1 tab(s) orally once a day (at bedtime) as needed for  insomnia (09 Jan 2025 15:28)  Percocet 10 mg-325 mg oral tablet: 1 tab(s) orally every 8 hours as needed for  moderate pain (09 Jan 2025 15:22)  predniSONE: 5 milligram(s) orally once a day (09 Jan 2025 15:29)  pregabalin 75 mg oral capsule: 1 cap(s) orally 2 times a day (09 Jan 2025 12:31)  warfarin 10 mg po daily

## 2025-01-10 LAB
INR BLD: 1.94 RATIO — HIGH (ref 0.65–1.3)
PROTHROM AB SERPL-ACNC: 23.2 SEC — HIGH (ref 9.95–12.87)
SURGICAL PATHOLOGY STUDY: SIGNIFICANT CHANGE UP

## 2025-01-10 PROCEDURE — 93010 ELECTROCARDIOGRAM REPORT: CPT

## 2025-01-10 PROCEDURE — 99232 SBSQ HOSP IP/OBS MODERATE 35: CPT

## 2025-01-10 RX ORDER — METOCLOPRAMIDE 10 MG/1
5 TABLET ORAL THREE TIMES A DAY
Refills: 0 | Status: DISCONTINUED | OUTPATIENT
Start: 2025-01-10 | End: 2025-01-15

## 2025-01-10 RX ORDER — WARFARIN SODIUM 2 MG/1
10 TABLET ORAL ONCE
Refills: 0 | Status: COMPLETED | OUTPATIENT
Start: 2025-01-10 | End: 2025-01-10

## 2025-01-10 RX ORDER — PREDNISONE 5 MG/1
2 TABLET ORAL DAILY
Refills: 0 | Status: DISCONTINUED | OUTPATIENT
Start: 2025-01-10 | End: 2025-01-19

## 2025-01-10 RX ORDER — METOCLOPRAMIDE 10 MG/1
5 TABLET ORAL EVERY 8 HOURS
Refills: 0 | Status: DISCONTINUED | OUTPATIENT
Start: 2025-01-10 | End: 2025-01-10

## 2025-01-10 RX ORDER — MORPHINE SULFATE 60 MG/1
2 TABLET, FILM COATED, EXTENDED RELEASE ORAL ONCE
Refills: 0 | Status: DISCONTINUED | OUTPATIENT
Start: 2025-01-10 | End: 2025-01-10

## 2025-01-10 RX ORDER — MORPHINE SULFATE 60 MG/1
1 TABLET, FILM COATED, EXTENDED RELEASE ORAL ONCE
Refills: 0 | Status: DISCONTINUED | OUTPATIENT
Start: 2025-01-10 | End: 2025-01-10

## 2025-01-10 RX ADMIN — PANTOPRAZOLE 40 MILLIGRAM(S): 20 TABLET, DELAYED RELEASE ORAL at 17:19

## 2025-01-10 RX ADMIN — SUCRALFATE 1 GRAM(S): 1 SUSPENSION ORAL at 17:19

## 2025-01-10 RX ADMIN — CALCIUM ACETATE 667 MILLIGRAM(S): 667 CAPSULE ORAL at 11:54

## 2025-01-10 RX ADMIN — METOCLOPRAMIDE 5 MILLIGRAM(S): 10 TABLET ORAL at 22:31

## 2025-01-10 RX ADMIN — CALCIUM ACETATE 667 MILLIGRAM(S): 667 CAPSULE ORAL at 08:15

## 2025-01-10 RX ADMIN — SUCRALFATE 1 GRAM(S): 1 SUSPENSION ORAL at 23:15

## 2025-01-10 RX ADMIN — Medication 0.5 MILLIGRAM(S): at 23:14

## 2025-01-10 RX ADMIN — ONDANSETRON 4 MILLIGRAM(S): 4 TABLET, ORALLY DISINTEGRATING ORAL at 17:27

## 2025-01-10 RX ADMIN — Medication 1 MILLIGRAM(S): at 11:54

## 2025-01-10 RX ADMIN — PREGABALIN CAPSULES, CV 75 MILLIGRAM(S): 225 CAPSULE ORAL at 17:19

## 2025-01-10 RX ADMIN — PREDNISONE 2 MILLIGRAM(S): 5 TABLET ORAL at 22:31

## 2025-01-10 RX ADMIN — DULOXETINE 60 MILLIGRAM(S): 20 CAPSULE, DELAYED RELEASE ORAL at 11:53

## 2025-01-10 RX ADMIN — CALCIUM ACETATE 667 MILLIGRAM(S): 667 CAPSULE ORAL at 17:19

## 2025-01-10 RX ADMIN — Medication 0.5 MILLIGRAM(S): at 00:11

## 2025-01-10 RX ADMIN — MORPHINE SULFATE 1 MILLIGRAM(S): 60 TABLET, FILM COATED, EXTENDED RELEASE ORAL at 23:55

## 2025-01-10 RX ADMIN — SUCRALFATE 1 GRAM(S): 1 SUSPENSION ORAL at 11:54

## 2025-01-10 RX ADMIN — WARFARIN SODIUM 10 MILLIGRAM(S): 2 TABLET ORAL at 22:11

## 2025-01-10 RX ADMIN — PREGABALIN CAPSULES, CV 75 MILLIGRAM(S): 225 CAPSULE ORAL at 05:44

## 2025-01-10 RX ADMIN — PANTOPRAZOLE 40 MILLIGRAM(S): 20 TABLET, DELAYED RELEASE ORAL at 05:25

## 2025-01-10 NOTE — PROGRESS NOTE ADULT - ASSESSMENT
45 y/o female with a PMHX of PE (on warfarin), Lupus,  RA, gastritis and ectopic pregnancy s/p L salpingectomy  who presents to the hospital for evaluation of a 1 month history of intractable abdominal pain. Patient reports that the pain started in early December and has recently been associated with vomiting NBNB emesis and decreased PO intake. She denies any fever, chills, headache, shortness of breath, diarrhea or dysuria .Of note patient has been taking low dose prednisone for the past 7 years after her last lupus flare.     #Suspected gastritis 2/2 to chronic steroid use   #chronic abdominal pain  #chest pain  -patient has been taking low dose prednisone for 7 years (currently on 5mg daily)  -epigastric pain>1 month in duration not associated with meals or viral prodrome   -c/w carafate   -c/w home percocet & ativan/lyrica  for anxiety  -S/P EGD on 1/9. Normal esophagus. Erosive gastritis and duodenitis.   -GI recommended Protonix 40 mg po q 12h for 8 weeks, then 40 mg po daily  -Cont Sucralfate  -Still having N/V. Add Reglan 5 mg po half an hour before meals.     #Hx of PE  -daily INR   -warfarin dosing as per pharmacy   -can consider transition to eliquis as patient was never started on it     #Hx of Lupus  #Hx of RA  -follows with Dr. Sim Sharpe  -no reported recent flairs   -Decreased Prednisone dose to 2 mg po daily      #MISC  DVT prophylaxis: warfarin   GI prophylaxis: Pantoprazole   Diet: Regular   Activity: IAT    43 y/o female with a PMHX of PE (on warfarin), Lupus,  RA, gastritis and ectopic pregnancy s/p L salpingectomy  who presents to the hospital for evaluation of a 1 month history of intractable abdominal pain. Patient reports that the pain started in early December and has recently been associated with vomiting NBNB emesis and decreased PO intake. She denies any fever, chills, headache, shortness of breath, diarrhea or dysuria .Of note patient has been taking low dose prednisone for the past 7 years after her last lupus flare.     #Suspected gastritis 2/2 to chronic steroid use   #chronic abdominal pain  #chest pain  -patient has been taking low dose prednisone for 7 years (currently on 5mg daily)  -epigastric pain>1 month in duration not associated with meals or viral prodrome   -c/w carafate   -c/w home percocet & ativan/lyrica  for anxiety  -S/P EGD on 1/9. Normal esophagus. Erosive gastritis and duodenitis.   -GI recommended Protonix 40 mg po q 12h for 8 weeks, then 40 mg po daily  -Cont Sucralfate  -Still having N/V. Add Reglan 5 mg po half an hour before meals.   -Pending improvement oral intake for DC    #Hx of PE  -daily INR   -warfarin dosing as per pharmacy   -can consider transition to eliquis as patient was never started on it     #Hx of Lupus  #Hx of RA  -follows with Dr. Sim Sharpe  -no reported recent flairs   -Decreased Prednisone dose to 2 mg po daily      #MISC  DVT prophylaxis: warfarin   GI prophylaxis: Pantoprazole   Diet: Regular   Activity: IAT

## 2025-01-10 NOTE — DIETITIAN INITIAL EVALUATION ADULT - OTHER INFO
Patient is a 44y old  Female who presents with a chief complaint of Intractable abdominal pain.     1.Abdominal pain / Nausea / Vomiting likely due to Erosive gastritis/duodenitis   2.H/O chronic PE  3.SLE / RA  ·CTA chest is negative for PE  ·CT abd/pelvis is unremarkable  ·S/P EGD on 1/9. Normal esophagus. Erosive gastritis and duodenitis.   ·Still having N/V. Add Reglan 5 mg po half an hour before meals.   ·Decrease Prednisone dose to 2 mg po daily

## 2025-01-10 NOTE — DIETITIAN INITIAL EVALUATION ADULT - ORAL INTAKE PTA/DIET HISTORY
PTA pt reports emesis upon food intake for ~1month. PO intake/appetite greatly decreased. Pt does not endorse a special diet. Pt reports usual body wt 109kg 1 month ago, current body wt 91.6kg, a 16% weight loss in 1 month - clinically significant.

## 2025-01-10 NOTE — DIETITIAN INITIAL EVALUATION ADULT - EDUCATION DIETARY MODIFICATIONS
Discussed adding supplements and benefits. Patient communicated understand and agreement./verbalization

## 2025-01-10 NOTE — PROGRESS NOTE ADULT - SUBJECTIVE AND OBJECTIVE BOX
SUBJECTIVE/OVERNIGHT EVENTS  Today is hospital day 3d. This morning patient was seen and examined at bedside, resting comfortably in bed. No acute or major events overnight.    CODE STATUS: FULL     MEDICATIONS  STANDING MEDICATIONS  calcium acetate 667 milliGRAM(s) Oral three times a day with meals  DULoxetine 60 milliGRAM(s) Oral daily  folic acid 1 milliGRAM(s) Oral daily  metoclopramide 5 milliGRAM(s) Oral three times a day  pantoprazole    Tablet 40 milliGRAM(s) Oral two times a day  predniSONE   Tablet 2 milliGRAM(s) Oral daily  pregabalin 75 milliGRAM(s) Oral two times a day  sucralfate 1 Gram(s) Oral every 6 hours    PRN MEDICATIONS  ALPRAZolam 0.5 milliGRAM(s) Oral every 12 hours PRN  ondansetron Injectable 4 milliGRAM(s) IV Push every 8 hours PRN  oxycodone    5 mG/acetaminophen 325 mG 2 Tablet(s) Oral every 8 hours PRN  oxycodone    5 mG/acetaminophen 325 mG 1 Tablet(s) Oral every 6 hours PRN    VITALS  T(F): 98.2 (01-10-25 @ 12:43), Max: 98.5 (01-09-25 @ 20:12)  HR: 59 (01-10-25 @ 12:43) (59 - 84)  BP: 160/86 (01-10-25 @ 12:43) (125/78 - 160/86)  RR: 18 (01-10-25 @ 12:43) (18 - 18)  SpO2: 100% (01-10-25 @ 04:10) (100% - 100%)    PHYSICAL EXAM  GENERAL  ( x ) NAD, lying in bed comfortably     (  ) obtunded     (  ) lethargic     (  ) somnolent    HEAD  (  ) Atraumatic     (  ) hematoma     (  ) laceration (specify location:       )     NECK  ( x ) Supple     (  ) neck stiffness     (  ) nuchal rigidity     (  )  no JVD     (  ) JVD present ( -- cm)    HEART  Rate -->  ( x ) normal rate    (  ) bradycardic    (  ) tachycardic  Rhythm -->  ( x ) regular    (  ) regularly irregular    (  ) irregularly irregular  Murmurs -->  (  ) normal s1/s2    (  ) systolic murmur    (  ) diastolic murmur    (  ) continuous murmur     (  ) S3 present    (  ) S4 present    LUNGS  ( x )Unlabored respirations     (  ) tachypnea  ( x ) B/L air entry     (  ) decreased breath sounds in:  (location     )    (  ) no adventitious sound     (  ) crackles     (  ) wheezing      (  ) rhonchi      (specify location:       )  (  ) chest wall tenderness (specify location:       )    ABDOMEN  (  ) Soft     ( x ) tense   |   (  ) nondistended     ( x ) distended   |   (  ) +BS     (  ) hypoactive bowel sounds     (  ) hyperactive bowel sounds  (  ) nontender     (  ) RUQ tenderness     (  ) RLQ tenderness     (  ) LLQ tenderness     (  ) epigastric tenderness     (  ) diffuse tenderness  (  ) Splenomegaly      (  ) Hepatomegaly      (  ) Jaundice     (  ) ecchymosis     EXTREMITIES  (x  ) Normal     (  ) Rash     (  ) ecchymosis     (  ) varicose veins      (  ) pitting edema     (  ) non-pitting edema   (  ) ulceration     (  ) gangrene:     (location:     )    NERVOUS SYSTEM  (  ) A&Ox3     (  ) confused     (  ) lethargic  CN II-XII:     (  ) Intact     (  ) focal deficits  (Specify:     )   Upper extremities:     (  ) strength X/5     (  ) focal deficit (specify:    )  Lower extremities:     (  ) strength  X/5    (  ) focal deficit (specify:    )    SKIN  (x  ) No rashes or lesions     (  ) maculopapular rash     (  ) pustules     (  ) vesicles     (  ) ulcer     (  ) ecchymosis     (specify location:     )      LABS      PT/INR - ( 01-09-25 @ 07:20 )   PT: 31.60 sec[H];   INR: 2.63 ratio[H]    Troponin T, High Sensitivity Result: <6 ng/L (01-08-25 @ 08:01)          IMAGING

## 2025-01-10 NOTE — PROGRESS NOTE ADULT - SUBJECTIVE AND OBJECTIVE BOX
PRESLEY DAMIAN  44y Female    CHIEF COMPLAINT:    Patient is a 44y old  Female who presents with a chief complaint of Intractable abdominal pain (2025 11:24)      INTERVAL HPI/OVERNIGHT EVENTS:    Patient seen and examined.    ROS: All other systems are negative.    Vital Signs:    T(F): 97.5 (01-10-25 @ 04:10), Max: 98.5 (25 @ 20:12)  HR: 84 (01-10-25 @ 04:10) (55 - 84)  BP: 126/72 (01-10-25 @ 04:10) (119/74 - 144/88)  RR: 18 (01-10-25 @ 04:10) (18 - 18)  SpO2: 100% (01-10-25 @ 04:10) (97% - 100%)  I&O's Summary    2025 07:01  -  10 Julius 2025 07:00  --------------------------------------------------------  IN: 100 mL / OUT: 750 mL / NET: -650 mL      Daily     Daily Weight in k (10 Julius 2025 04:32)  CAPILLARY BLOOD GLUCOSE          PHYSICAL EXAM:    GENERAL:  NAD  SKIN: No rashes or lesions  HENT: Atraumatic. Normocephalic. PERRL. Moist membranes.  NECK: Supple, No JVD. No lymphadenopathy.  PULMONARY: CTA B/L. No wheezing. No rales  CVS: Normal S1, S2. Rate and Rhythm are regular. No murmurs.  ABDOMEN/GI: Soft, Nontender, Nondistended; BS present  EXTREMITIES: Peripheral pulses intact. No edema B/L LE.  NEUROLOGIC:  No motor or sensory deficit.  PSYCH: Alert & oriented x 3    Consultant(s) Notes Reviewed:  [x ] YES  [ ] NO  Care Discussed with Consultants/Other Providers [ x] YES  [ ] NO    EKG reviewed  Telemetry reviewed    LABS:          PT/INR - ( 2025 07:20 )   PT: 31.60 sec;   INR: 2.63 ratio                   RADIOLOGY & ADDITIONAL TESTS:      Imaging or report Personally Reviewed:  [ ] YES  [ ] NO    Medications:  Standing  calcium acetate 667 milliGRAM(s) Oral three times a day with meals  DULoxetine 60 milliGRAM(s) Oral daily  folic acid 1 milliGRAM(s) Oral daily  pantoprazole    Tablet 40 milliGRAM(s) Oral two times a day  pregabalin 75 milliGRAM(s) Oral two times a day  sodium chloride 0.9%. 1000 milliLiter(s) IV Continuous <Continuous>  sucralfate 1 Gram(s) Oral every 6 hours    PRN Meds  ALPRAZolam 0.5 milliGRAM(s) Oral every 12 hours PRN  ondansetron Injectable 4 milliGRAM(s) IV Push every 8 hours PRN  oxycodone    5 mG/acetaminophen 325 mG 2 Tablet(s) Oral every 8 hours PRN  oxycodone    5 mG/acetaminophen 325 mG 1 Tablet(s) Oral every 6 hours PRN      Case discussed with resident    Care discussed with pt/family           DAMIAN PRESLEY  44y Female    CHIEF COMPLAINT:    Patient is a 44y old  Female who presents with a chief complaint of Intractable abdominal pain (2025 11:24)      INTERVAL HPI/OVERNIGHT EVENTS:    Patient seen and examined. Pt complains that she is still having nausea and vomiting and is unable to eat.     ROS: All other systems are negative.    Vital Signs:    T(F): 97.5 (01-10-25 @ 04:10), Max: 98.5 (25 @ 20:12)  HR: 84 (01-10-25 @ 04:10) (55 - 84)  BP: 126/72 (01-10-25 @ 04:10) (119/74 - 144/88)  RR: 18 (01-10-25 @ 04:10) (18 - 18)  SpO2: 100% (01-10-25 @ 04:10) (97% - 100%)  I&O's Summary    2025 07:01  -  10 Julius 2025 07:00  --------------------------------------------------------  IN: 100 mL / OUT: 750 mL / NET: -650 mL      Daily     Daily Weight in k (10 Julius 2025 04:32)  CAPILLARY BLOOD GLUCOSE          PHYSICAL EXAM:    GENERAL:  NAD  SKIN: No rashes or lesions  HENT: Atraumatic. Normocephalic. PERRL. Moist membranes.  NECK: Supple, No JVD. No lymphadenopathy.  PULMONARY: CTA B/L. No wheezing. No rales  CVS: Normal S1, S2. Rate and Rhythm are regular. No murmurs.  ABDOMEN/GI: Soft, Nontender, Nondistended; BS present  EXTREMITIES: Peripheral pulses intact. No edema B/L LE.  NEUROLOGIC:  No motor or sensory deficit.  PSYCH: Alert & oriented x 3    Consultant(s) Notes Reviewed:  [x ] YES  [ ] NO  Care Discussed with Consultants/Other Providers [ x] YES  [ ] NO    EKG reviewed  Telemetry reviewed    LABS:          PT/INR - ( 2025 07:20 )   PT: 31.60 sec;   INR: 2.63 ratio                   RADIOLOGY & ADDITIONAL TESTS:      Imaging or report Personally Reviewed:  [ ] YES  [ ] NO    Medications:  Standing  calcium acetate 667 milliGRAM(s) Oral three times a day with meals  DULoxetine 60 milliGRAM(s) Oral daily  folic acid 1 milliGRAM(s) Oral daily  pantoprazole    Tablet 40 milliGRAM(s) Oral two times a day  pregabalin 75 milliGRAM(s) Oral two times a day  sodium chloride 0.9%. 1000 milliLiter(s) IV Continuous <Continuous>  sucralfate 1 Gram(s) Oral every 6 hours    PRN Meds  ALPRAZolam 0.5 milliGRAM(s) Oral every 12 hours PRN  ondansetron Injectable 4 milliGRAM(s) IV Push every 8 hours PRN  oxycodone    5 mG/acetaminophen 325 mG 2 Tablet(s) Oral every 8 hours PRN  oxycodone    5 mG/acetaminophen 325 mG 1 Tablet(s) Oral every 6 hours PRN      Case discussed with resident    Care discussed with pt/family

## 2025-01-10 NOTE — DIETITIAN INITIAL EVALUATION ADULT - PERTINENT MEDS FT
MEDICATIONS  (STANDING):  calcium acetate 667 milliGRAM(s) Oral three times a day with meals  DULoxetine 60 milliGRAM(s) Oral daily  folic acid 1 milliGRAM(s) Oral daily  metoclopramide 5 milliGRAM(s) Oral three times a day  pantoprazole    Tablet 40 milliGRAM(s) Oral two times a day  predniSONE   Tablet 2 milliGRAM(s) Oral daily  pregabalin 75 milliGRAM(s) Oral two times a day  sucralfate 1 Gram(s) Oral every 6 hours  warfarin 10 milliGRAM(s) Oral once    MEDICATIONS  (PRN):  ALPRAZolam 0.5 milliGRAM(s) Oral every 12 hours PRN Anxiety  ondansetron Injectable 4 milliGRAM(s) IV Push every 8 hours PRN Nausea and/or Vomiting  oxycodone    5 mG/acetaminophen 325 mG 2 Tablet(s) Oral every 8 hours PRN Severe Pain (7 - 10)  oxycodone    5 mG/acetaminophen 325 mG 1 Tablet(s) Oral every 6 hours PRN Moderate Pain (4 - 6)

## 2025-01-10 NOTE — DIETITIAN INITIAL EVALUATION ADULT - ADD RECOMMEND
High Risk    Interventions: Clear Liquids, nutritional supplements, coordination of care  Monitoring/Evaluation: Weights, labs, PO intake, nutrition-focused physical findings, tolerance of nutritional supplement  1. Continue current diet  2. Add nutritional supplement   3. Encourage PO intake, hydration, & assist PRN

## 2025-01-10 NOTE — PROGRESS NOTE ADULT - ASSESSMENT
45 y/o female with a PMHX of PE (on warfarin), Lupus,  RA, gastritis and ectopic pregnancy s/p L salpingectomy  who presents to the hospital for evaluation of a 1 month history of intractable abdominal pain. Patient reports that the pain started in early December and has recently been associated with vomiting NBNB emesis and decreased PO intake.     Abdominal pain / Nausea / Vomiting  H/O chronic PE  SLE / RA                 PLAN:    ·	Tele reviewed by me. No events  ·	EKG on admission: NSR 74/min (interpreted by me)  ·	Troponin: <6--> <6  ·	CTA chest is negative for PE  ·	CT abd/pelvis is unremarkable  ·	Pt is on Prednisone for a long time. Restart her home dose of Prednisone 5 mg po daily  ·	GI eval noted. Scheduled for EGD today  ·	NPO  for now  ·	Cont Protonix and Sucralfate.     Progress Note Handoff    Pending (specify):  Consults_________, Tests________, Test Results_______, Other_EGD today________  Family discussion:  Disposition: Home___/SNF___/Other________/Unknown at this time________    Ankit Francois MD  Spectra: [phone] 45 y/o female with a PMHX of PE (on warfarin), Lupus,  RA, gastritis and ectopic pregnancy s/p L salpingectomy  who presents to the hospital for evaluation of a 1 month history of intractable abdominal pain. Patient reports that the pain started in early December and has recently been associated with vomiting NBNB emesis and decreased PO intake.     Abdominal pain / Nausea / Vomiting likely due to Erosive gastritis/duodenitis   H/O chronic PE  SLE / RA                 PLAN:    ·	Tele reviewed by me. No events  ·	EKG on admission: NSR 74/min (interpreted by me)  ·	Troponin: <6--> <6  ·	CTA chest is negative for PE  ·	CT abd/pelvis is unremarkable  ·	S/P EGD on 1/9. Normal esophagus. Erosive gastritis and duodenitis.   ·	GI recommended Protonix 40 mg po q 12h for 8 weeks, then 40 mg po daily  ·	Cont Sucralfate  ·	Still having N/V. Add Reglan 5 mg po half an hour before meals.   ·	Decrease Prednisone dose to 2 mg po daily  ·	Check INR and give Coumadin accordingly    Progress Note Handoff    Pending (specify):  Consults_________, Tests________, Test Results_______, Other_Nausea/Vomiting________  Family discussion:  Disposition: Home___/SNF___/Other________/Unknown at this time________    Ankit Francois MD  Spectra: [phone]

## 2025-01-10 NOTE — PHARMACOTHERAPY INTERVENTION NOTE - COMMENTS
44yFemale    Indication: History of PE  INR Goal: 2-3  Home Dose: warfarin 10 mg po daily  Bridge Therapy: not indicated    Current Medications:  ALPRAZolam 0.5 milliGRAM(s) Oral every 12 hours PRN  calcium acetate 667 milliGRAM(s) Oral three times a day with meals  DULoxetine 60 milliGRAM(s) Oral daily  folic acid 1 milliGRAM(s) Oral daily  metoclopramide 5 milliGRAM(s) Oral three times a day  ondansetron Injectable 4 milliGRAM(s) IV Push every 8 hours PRN  oxycodone    5 mG/acetaminophen 325 mG 2 Tablet(s) Oral every 8 hours PRN  oxycodone    5 mG/acetaminophen 325 mG 1 Tablet(s) Oral every 6 hours PRN  pantoprazole    Tablet 40 milliGRAM(s) Oral two times a day  predniSONE   Tablet 2 milliGRAM(s) Oral daily  pregabalin 75 milliGRAM(s) Oral two times a day  sucralfate 1 Gram(s) Oral every 6 hours      hemoglobin 13.4 g/dL (01-08-25 @ 08:01)    hematocrit 41.6 % (01-08-25 @ 08:01)    PLT: 219 K/uL (01-08-25 @ 08:01)    GFR:108 mL/min/1.73m2 (01-08-25 @ 08:01)      Drug Interactions: none at this time    INR trend  4.65 ratio (01-07-25 @ 02:06)  3.78 ratio (01-08-25 @ 08:01)  2.63 ratio (01-09-25 @ 07:20)  1.94 ratio (01-10-25 @ 13:33)      Warfarin administration history:  warfarin: 7.5 milliGRAM(s) (01-09-25 @ 21:11)        1. INR today is:                    [ x ] below goal ----- This is likely due to held doses on 1/7 and 1/8 secondary to supratherapeutic INR                                            [   ] at goal                                            [   ] above goal         2. Recommend Warfarin  10 mg PO x 1     3. Obtain INR tomorrow AM  Patient with a history of PE in the past, also with lupus. Patient likely on warfarin due to hypercoagulable state in setting of lupus, unclear what her past workup has been. I spoke to the patient regarding her home warfarin dose she states she has varied between 5 and 10 mg po daily. She also told me she has not followed with an INR check in some time and does not go to a warfarin clinic at this time. Her INR on admission was = 4.65 unclear how long her INR has been supratherapeutic. No warfarin was given on 1/7 or 1/8. Recommended a dose of 7.5 mg on 1/9. INR is now subtherapeutic (1.94) likely secondary to held doses= 2.63. Would recommend giving home dose of 10 mg of warfarin today and continue to trend INR daily to aid in warfarin dosing.

## 2025-01-10 NOTE — DIETITIAN INITIAL EVALUATION ADULT - NUTRITIONGOAL OUTCOME1
Patient will tolerate and consume 50% > of clear liquid diet without GI distress within 3-5 days.   Patient will consume 50% or > supplements within 3-5 days.

## 2025-01-11 PROCEDURE — 99233 SBSQ HOSP IP/OBS HIGH 50: CPT

## 2025-01-11 RX ORDER — POLYETHYLENE GLYCOL 3350 17 G/17G
17 POWDER, FOR SOLUTION ORAL ONCE
Refills: 0 | Status: COMPLETED | OUTPATIENT
Start: 2025-01-11 | End: 2025-01-11

## 2025-01-11 RX ADMIN — DULOXETINE 60 MILLIGRAM(S): 20 CAPSULE, DELAYED RELEASE ORAL at 12:05

## 2025-01-11 RX ADMIN — SUCRALFATE 1 GRAM(S): 1 SUSPENSION ORAL at 05:12

## 2025-01-11 RX ADMIN — CALCIUM ACETATE 667 MILLIGRAM(S): 667 CAPSULE ORAL at 08:08

## 2025-01-11 RX ADMIN — PREDNISONE 2 MILLIGRAM(S): 5 TABLET ORAL at 05:12

## 2025-01-11 RX ADMIN — PREGABALIN CAPSULES, CV 75 MILLIGRAM(S): 225 CAPSULE ORAL at 05:13

## 2025-01-11 RX ADMIN — Medication 1 MILLIGRAM(S): at 12:05

## 2025-01-11 RX ADMIN — METOCLOPRAMIDE 5 MILLIGRAM(S): 10 TABLET ORAL at 05:13

## 2025-01-11 RX ADMIN — CALCIUM ACETATE 667 MILLIGRAM(S): 667 CAPSULE ORAL at 18:11

## 2025-01-11 RX ADMIN — PANTOPRAZOLE 40 MILLIGRAM(S): 20 TABLET, DELAYED RELEASE ORAL at 05:13

## 2025-01-11 RX ADMIN — PREGABALIN CAPSULES, CV 75 MILLIGRAM(S): 225 CAPSULE ORAL at 18:11

## 2025-01-11 RX ADMIN — Medication 0.5 MILLIGRAM(S): at 21:39

## 2025-01-11 RX ADMIN — SUCRALFATE 1 GRAM(S): 1 SUSPENSION ORAL at 12:05

## 2025-01-11 RX ADMIN — METOCLOPRAMIDE 5 MILLIGRAM(S): 10 TABLET ORAL at 21:39

## 2025-01-11 RX ADMIN — METOCLOPRAMIDE 5 MILLIGRAM(S): 10 TABLET ORAL at 12:04

## 2025-01-11 RX ADMIN — PANTOPRAZOLE 40 MILLIGRAM(S): 20 TABLET, DELAYED RELEASE ORAL at 18:11

## 2025-01-11 RX ADMIN — SUCRALFATE 1 GRAM(S): 1 SUSPENSION ORAL at 18:11

## 2025-01-11 RX ADMIN — ONDANSETRON 4 MILLIGRAM(S): 4 TABLET, ORALLY DISINTEGRATING ORAL at 08:08

## 2025-01-11 RX ADMIN — CALCIUM ACETATE 667 MILLIGRAM(S): 667 CAPSULE ORAL at 12:04

## 2025-01-11 RX ADMIN — POLYETHYLENE GLYCOL 3350 17 GRAM(S): 17 POWDER, FOR SOLUTION ORAL at 06:19

## 2025-01-11 NOTE — PROGRESS NOTE ADULT - ASSESSMENT
43 y/o female with a PMHX of PE (on warfarin), Lupus,  RA, gastritis and ectopic pregnancy s/p L salpingectomy  who presents to the hospital for evaluation of a 1 month history of intractable abdominal pain. Patient reports that the pain started in early December and has recently been associated with vomiting NBNB emesis and decreased PO intake. She denies any fever, chills, headache, shortness of breath, diarrhea or dysuria .Of note patient has been taking low dose prednisone for the past 7 years after her last lupus flare.     #Suspected gastritis 2/2 to chronic steroid use   #chronic abdominal pain  #chest pain  -patient has been taking low dose prednisone for 7 years (currently on 5mg daily)  -epigastric pain>1 month in duration not associated with meals or viral prodrome   -c/w carafate   -c/w home percocet & ativan/lyrica  for anxiety  -S/P EGD on 1/9. Normal esophagus. Erosive gastritis and duodenitis.   -GI recommended Protonix 40 mg po q 12h for 8 weeks, then 40 mg po daily  -Cont Sucralfate  -Still having N/V. Add Reglan 5 mg po half an hour before meals.   -Diet currently Full Liquid, dietitian recommends supplementation Ensure clear 3x/day (240 kcal, 8 g protein per serving).   -Pending improvement oral intake for DC    #Hx of PE  -daily INR   -warfarin dosing as per pharmacy   -can consider transition to eliquis as patient was never started on it     #Hx of Lupus  #Hx of RA  -follows with Dr. Sim Sharpe  -no reported recent flairs   -Decreased Prednisone dose to 2 mg po daily    #MISC  DVT prophylaxis: warfarin   GI prophylaxis: Pantoprazole   Diet: Regular   Activity: IAT

## 2025-01-11 NOTE — PROGRESS NOTE ADULT - SUBJECTIVE AND OBJECTIVE BOX
SUBJECTIVE/OVERNIGHT EVENTS  Today is hospital day 4d. This morning patient was seen and examined at bedside, resting comfortably in bed. No acute or major events overnight.    CODE STATUS: FULL (Check)     MEDICATIONS  STANDING MEDICATIONS  calcium acetate 667 milliGRAM(s) Oral three times a day with meals  DULoxetine 60 milliGRAM(s) Oral daily  folic acid 1 milliGRAM(s) Oral daily  metoclopramide 5 milliGRAM(s) Oral three times a day  pantoprazole    Tablet 40 milliGRAM(s) Oral two times a day  predniSONE   Tablet 2 milliGRAM(s) Oral daily  pregabalin 75 milliGRAM(s) Oral two times a day  sucralfate 1 Gram(s) Oral every 6 hours    PRN MEDICATIONS  ALPRAZolam 0.5 milliGRAM(s) Oral every 12 hours PRN  ondansetron Injectable 4 milliGRAM(s) IV Push every 8 hours PRN  oxycodone    5 mG/acetaminophen 325 mG 1 Tablet(s) Oral every 6 hours PRN  oxycodone    5 mG/acetaminophen 325 mG 2 Tablet(s) Oral every 8 hours PRN    VITALS  T(F): 97.7 (01-11-25 @ 04:59), Max: 98.2 (01-10-25 @ 12:43)  HR: 59 (01-11-25 @ 04:59) (59 - 77)  BP: 115/74 (01-11-25 @ 04:59) (115/74 - 160/86)  RR: 18 (01-11-25 @ 04:59) (18 - 18)  SpO2: 99% (01-10-25 @ 22:53) (99% - 99%)    PHYSICAL EXAM  GENERAL  ( x ) NAD, lying in bed comfortably     (  ) obtunded     (  ) lethargic     (  ) somnolent    HEAD  (  ) Atraumatic     (  ) hematoma     (  ) laceration (specify location:       )     NECK  ( x ) Supple     (  ) neck stiffness     (  ) nuchal rigidity     (  )  no JVD     (  ) JVD present ( -- cm)    HEART  Rate -->  ( x ) normal rate    (  ) bradycardic    (  ) tachycardic  Rhythm -->  ( x ) regular    (  ) regularly irregular    (  ) irregularly irregular  Murmurs -->  (  ) normal s1/s2    (  ) systolic murmur    (  ) diastolic murmur    (  ) continuous murmur     (  ) S3 present    (  ) S4 present    LUNGS  ( x )Unlabored respirations     (  ) tachypnea  ( x ) B/L air entry     (  ) decreased breath sounds in:  (location     )    (  ) no adventitious sound     (  ) crackles     (  ) wheezing      (  ) rhonchi      (specify location:       )  (  ) chest wall tenderness (specify location:       )    ABDOMEN  (  ) Soft     ( x ) tense   |   (  ) nondistended     ( x ) distended   |   (  ) +BS     (  ) hypoactive bowel sounds     (  ) hyperactive bowel sounds  (  ) nontender     (  ) RUQ tenderness     (  ) RLQ tenderness     (  ) LLQ tenderness     (  ) epigastric tenderness     (  ) diffuse tenderness  (  ) Splenomegaly      (  ) Hepatomegaly      (  ) Jaundice     (  ) ecchymosis     EXTREMITIES  (x  ) Normal     (  ) Rash     (  ) ecchymosis     (  ) varicose veins      (  ) pitting edema     (  ) non-pitting edema   (  ) ulceration     (  ) gangrene:     (location:     )    NERVOUS SYSTEM  (  ) A&Ox3     (  ) confused     (  ) lethargic  CN II-XII:     (  ) Intact     (  ) focal deficits  (Specify:     )   Upper extremities:     (  ) strength X/5     (  ) focal deficit (specify:    )  Lower extremities:     (  ) strength  X/5    (  ) focal deficit (specify:    )    SKIN  (x  ) No rashes or lesions     (  ) maculopapular rash     (  ) pustules     (  ) vesicles     (  ) ulcer     (  ) ecchymosis     (specify location:     )    LABS          PT/INR - ( 01-10-25 @ 13:33 )   PT: 23.20 sec[H];   INR: 1.94 ratio[H]            IMAGING SUBJECTIVE/OVERNIGHT EVENTS  Today is hospital day 4d. This morning patient was seen and examined at bedside, resting comfortably in bed. No acute or major events overnight.    CODE STATUS: FULL     MEDICATIONS  STANDING MEDICATIONS  calcium acetate 667 milliGRAM(s) Oral three times a day with meals  DULoxetine 60 milliGRAM(s) Oral daily  folic acid 1 milliGRAM(s) Oral daily  metoclopramide 5 milliGRAM(s) Oral three times a day  pantoprazole    Tablet 40 milliGRAM(s) Oral two times a day  predniSONE   Tablet 2 milliGRAM(s) Oral daily  pregabalin 75 milliGRAM(s) Oral two times a day  sucralfate 1 Gram(s) Oral every 6 hours    PRN MEDICATIONS  ALPRAZolam 0.5 milliGRAM(s) Oral every 12 hours PRN  ondansetron Injectable 4 milliGRAM(s) IV Push every 8 hours PRN  oxycodone    5 mG/acetaminophen 325 mG 1 Tablet(s) Oral every 6 hours PRN  oxycodone    5 mG/acetaminophen 325 mG 2 Tablet(s) Oral every 8 hours PRN    VITALS  T(F): 97.7 (01-11-25 @ 04:59), Max: 98.2 (01-10-25 @ 12:43)  HR: 59 (01-11-25 @ 04:59) (59 - 77)  BP: 115/74 (01-11-25 @ 04:59) (115/74 - 160/86)  RR: 18 (01-11-25 @ 04:59) (18 - 18)  SpO2: 99% (01-10-25 @ 22:53) (99% - 99%)    PHYSICAL EXAM  GENERAL  ( x ) NAD, lying in bed comfortably     (  ) obtunded     (  ) lethargic     (  ) somnolent    HEAD  (  ) Atraumatic     (  ) hematoma     (  ) laceration (specify location:       )     NECK  ( x ) Supple     (  ) neck stiffness     (  ) nuchal rigidity     (  )  no JVD     (  ) JVD present ( -- cm)    HEART  Rate -->  ( x ) normal rate    (  ) bradycardic    (  ) tachycardic  Rhythm -->  ( x ) regular    (  ) regularly irregular    (  ) irregularly irregular  Murmurs -->  (  ) normal s1/s2    (  ) systolic murmur    (  ) diastolic murmur    (  ) continuous murmur     (  ) S3 present    (  ) S4 present    LUNGS  ( x )Unlabored respirations     (  ) tachypnea  ( x ) B/L air entry     (  ) decreased breath sounds in:  (location     )    (  ) no adventitious sound     (  ) crackles     (  ) wheezing      (  ) rhonchi      (specify location:       )  (  ) chest wall tenderness (specify location:       )    ABDOMEN  (  ) Soft     ( x ) tense   |   (  ) nondistended     ( x ) distended   |   (  ) +BS     (  ) hypoactive bowel sounds     (  ) hyperactive bowel sounds  (  ) nontender     (  ) RUQ tenderness     (  ) RLQ tenderness     (  ) LLQ tenderness     (  ) epigastric tenderness     (  ) diffuse tenderness  (  ) Splenomegaly      (  ) Hepatomegaly      (  ) Jaundice     (  ) ecchymosis     EXTREMITIES  (x  ) Normal     (  ) Rash     (  ) ecchymosis     (  ) varicose veins      (  ) pitting edema     (  ) non-pitting edema   (  ) ulceration     (  ) gangrene:     (location:     )    NERVOUS SYSTEM  (  ) A&Ox3     (  ) confused     (  ) lethargic  CN II-XII:     (  ) Intact     (  ) focal deficits  (Specify:     )   Upper extremities:     (  ) strength X/5     (  ) focal deficit (specify:    )  Lower extremities:     (  ) strength  X/5    (  ) focal deficit (specify:    )    SKIN  (x  ) No rashes or lesions     (  ) maculopapular rash     (  ) pustules     (  ) vesicles     (  ) ulcer     (  ) ecchymosis     (specify location:     )    LABS          PT/INR - ( 01-10-25 @ 13:33 )   PT: 23.20 sec[H];   INR: 1.94 ratio[H]            IMAGING

## 2025-01-12 LAB
HCG SERPL-ACNC: 6.3 MIU/ML — HIGH
INR BLD: 2.24 RATIO — HIGH (ref 0.65–1.3)
PROTHROM AB SERPL-ACNC: 26.9 SEC — HIGH (ref 9.95–12.87)

## 2025-01-12 PROCEDURE — 76830 TRANSVAGINAL US NON-OB: CPT | Mod: 26

## 2025-01-12 PROCEDURE — 99232 SBSQ HOSP IP/OBS MODERATE 35: CPT

## 2025-01-12 RX ORDER — POLYETHYLENE GLYCOL 3350 17 G/17G
17 POWDER, FOR SOLUTION ORAL DAILY
Refills: 0 | Status: DISCONTINUED | OUTPATIENT
Start: 2025-01-12 | End: 2025-01-14

## 2025-01-12 RX ADMIN — METOCLOPRAMIDE 5 MILLIGRAM(S): 10 TABLET ORAL at 13:12

## 2025-01-12 RX ADMIN — PREGABALIN CAPSULES, CV 75 MILLIGRAM(S): 225 CAPSULE ORAL at 05:53

## 2025-01-12 RX ADMIN — PREDNISONE 2 MILLIGRAM(S): 5 TABLET ORAL at 05:53

## 2025-01-12 RX ADMIN — ONDANSETRON 4 MILLIGRAM(S): 4 TABLET, ORALLY DISINTEGRATING ORAL at 16:36

## 2025-01-12 RX ADMIN — SUCRALFATE 1 GRAM(S): 1 SUSPENSION ORAL at 05:53

## 2025-01-12 RX ADMIN — SUCRALFATE 1 GRAM(S): 1 SUSPENSION ORAL at 23:23

## 2025-01-12 RX ADMIN — CALCIUM ACETATE 667 MILLIGRAM(S): 667 CAPSULE ORAL at 17:47

## 2025-01-12 RX ADMIN — Medication 1 MILLIGRAM(S): at 12:46

## 2025-01-12 RX ADMIN — CALCIUM ACETATE 667 MILLIGRAM(S): 667 CAPSULE ORAL at 12:45

## 2025-01-12 RX ADMIN — PREGABALIN CAPSULES, CV 75 MILLIGRAM(S): 225 CAPSULE ORAL at 17:47

## 2025-01-12 RX ADMIN — PANTOPRAZOLE 40 MILLIGRAM(S): 20 TABLET, DELAYED RELEASE ORAL at 17:47

## 2025-01-12 RX ADMIN — POLYETHYLENE GLYCOL 3350 17 GRAM(S): 17 POWDER, FOR SOLUTION ORAL at 13:13

## 2025-01-12 RX ADMIN — SUCRALFATE 1 GRAM(S): 1 SUSPENSION ORAL at 12:45

## 2025-01-12 RX ADMIN — ONDANSETRON 4 MILLIGRAM(S): 4 TABLET, ORALLY DISINTEGRATING ORAL at 08:36

## 2025-01-12 RX ADMIN — METOCLOPRAMIDE 5 MILLIGRAM(S): 10 TABLET ORAL at 05:53

## 2025-01-12 RX ADMIN — METOCLOPRAMIDE 5 MILLIGRAM(S): 10 TABLET ORAL at 21:19

## 2025-01-12 RX ADMIN — Medication 0.5 MILLIGRAM(S): at 21:19

## 2025-01-12 RX ADMIN — PANTOPRAZOLE 40 MILLIGRAM(S): 20 TABLET, DELAYED RELEASE ORAL at 05:52

## 2025-01-12 RX ADMIN — CALCIUM ACETATE 667 MILLIGRAM(S): 667 CAPSULE ORAL at 08:41

## 2025-01-12 RX ADMIN — SUCRALFATE 1 GRAM(S): 1 SUSPENSION ORAL at 17:47

## 2025-01-12 RX ADMIN — DULOXETINE 60 MILLIGRAM(S): 20 CAPSULE, DELAYED RELEASE ORAL at 12:46

## 2025-01-12 NOTE — PROGRESS NOTE ADULT - SUBJECTIVE AND OBJECTIVE BOX
SUBJECTIVE/OVERNIGHT EVENTS  Today is hospital day 5d. This morning patient was seen and examined at bedside, resting comfortably in bed. No acute or major events overnight.    CODE STATUS: FULL     MEDICATIONS  MEDICATIONS  (STANDING):  calcium acetate 667 milliGRAM(s) Oral three times a day with meals  DULoxetine 60 milliGRAM(s) Oral daily  folic acid 1 milliGRAM(s) Oral daily  metoclopramide 5 milliGRAM(s) Oral three times a day  pantoprazole    Tablet 40 milliGRAM(s) Oral two times a day  predniSONE   Tablet 2 milliGRAM(s) Oral daily  pregabalin 75 milliGRAM(s) Oral two times a day  sucralfate 1 Gram(s) Oral every 6 hours    MEDICATIONS  (PRN):  ALPRAZolam 0.5 milliGRAM(s) Oral every 12 hours PRN Anxiety  ondansetron Injectable 4 milliGRAM(s) IV Push every 8 hours PRN Nausea and/or Vomiting  oxycodone    5 mG/acetaminophen 325 mG 1 Tablet(s) Oral every 6 hours PRN Moderate Pain (4 - 6)  oxycodone    5 mG/acetaminophen 325 mG 2 Tablet(s) Oral every 8 hours PRN Severe Pain (7 - 10)    VITALS  Vital Signs Last 24 Hrs  T(C): 36.4 (12 Jan 2025 05:28), Max: 36.7 (11 Jan 2025 20:42)  T(F): 97.5 (12 Jan 2025 05:28), Max: 98.1 (11 Jan 2025 20:42)  HR: 66 (12 Jan 2025 05:28) (60 - 66)  BP: 107/66 (12 Jan 2025 05:28) (107/66 - 143/91)  BP(mean): --  RR: 18 (12 Jan 2025 05:28) (18 - 18)  SpO2: 99% (12 Jan 2025 05:28) (99% - 99%)        PHYSICAL EXAM  CONSTITUTIONAL: NAD, well-developed, well-groomed  EYES: conjunctiva and sclera clear  ENMT: normal  RESPIRATORY: normal respiratory effort; lungs are clear to auscultation bilaterally  CARDIOVASCULAR: S1S2, RRR  ABDOMEN: +BS, NTND  PSYCH: affect appropriate  NEUROLOGY: grossly normal  SKIN: no jaundice    LABS        IMAGING

## 2025-01-12 NOTE — PROGRESS NOTE ADULT - SUBJECTIVE AND OBJECTIVE BOX
SUBJECTIVE/OVERNIGHT EVENTS  Today is hospital day 5d. This morning patient was seen and examined at bedside, resting comfortably in bed. No acute or major events overnight.    MEDICATIONS  STANDING MEDICATIONS  calcium acetate 667 milliGRAM(s) Oral three times a day with meals  DULoxetine 60 milliGRAM(s) Oral daily  folic acid 1 milliGRAM(s) Oral daily  metoclopramide 5 milliGRAM(s) Oral three times a day  pantoprazole    Tablet 40 milliGRAM(s) Oral two times a day  polyethylene glycol 3350 17 Gram(s) Oral daily  predniSONE   Tablet 2 milliGRAM(s) Oral daily  pregabalin 75 milliGRAM(s) Oral two times a day  sucralfate 1 Gram(s) Oral every 6 hours    PRN MEDICATIONS  ALPRAZolam 0.5 milliGRAM(s) Oral every 12 hours PRN  ondansetron Injectable 4 milliGRAM(s) IV Push every 8 hours PRN  oxycodone    5 mG/acetaminophen 325 mG 1 Tablet(s) Oral every 6 hours PRN  oxycodone    5 mG/acetaminophen 325 mG 2 Tablet(s) Oral every 8 hours PRN    VITALS  T(F): 98 (01-12-25 @ 12:44), Max: 98.1 (01-11-25 @ 20:42)  HR: 56 (01-12-25 @ 12:44) (56 - 66)  BP: 135/84 (01-12-25 @ 12:44) (107/66 - 143/91)  RR: 18 (01-12-25 @ 12:44) (18 - 18)  SpO2: 99% (01-12-25 @ 05:28) (99% - 99%)    PHYSICAL EXAM  CONSTITUTIONAL: NAD, well-developed, well-groomed  EYES: conjunctiva and sclera clear  ENMT: normal  RESPIRATORY: normal respiratory effort; lungs are clear to auscultation bilaterally  CARDIOVASCULAR: S1S2, RRR  ABDOMEN: +BS, NTND  PSYCH: affect appropriate  NEUROLOGY: grossly normal  SKIN: no jaundice    LABS          PT/INR - ( 01-10-25 @ 13:33 )   PT: 23.20 sec[H];   INR: 1.94 ratio[H]            IMAGING

## 2025-01-12 NOTE — PROGRESS NOTE ADULT - ASSESSMENT
45 y/o female with a PMHX of PE (on warfarin), Lupus,  RA, gastritis and ectopic pregnancy s/p L salpingectomy  who presents to the hospital for evaluation of a 1 month history of intractable abdominal pain. Patient reports that the pain started in early December and has recently been associated with vomiting NBNB emesis and decreased PO intake. She denies any fever, chills, headache, shortness of breath, diarrhea or dysuria .Of note patient has been taking low dose prednisone for the past 7 years after her last lupus flare.     #Suspected gastritis 2/2 to chronic steroid use   #chronic abdominal pain  #chest pain  -patient has been taking low dose prednisone for 7 years (currently on 5mg daily)  -epigastric pain>1 month in duration not associated with meals or viral prodrome   -c/w carafate   -c/w home percocet & ativan/lyrica  for anxiety  -S/P EGD on 1/9. Normal esophagus. Erosive gastritis and duodenitis. f/u biopsy, outpatient GI f/u  -Continue Protonix 40 mg po q 12h for 8 weeks, then 40 mg po daily  -cont Sucralfate  -Still having N/V. Add Reglan 5 mg po half an hour before meals  -Diet currently Full Liquid, dietitian recommends supplementation Ensure clear 3x/day (240 kcal, 8 g protein per serving).   -Pending improvement oral intake for DC, will upgrade today if patient agreeable to solid diet    #Hx of PE  -daily INR   -warfarin dosing as per pharmacy   -check INR today  -can consider transition to eliquis as patient was never started on it     #Hx of Lupus  #Hx of RA  -follows with Dr. Sim Shrape  -no reported recent flairs   -Decreased Prednisone dose to 2 mg po daily    #MISC  DVT prophylaxis: warfarin   GI prophylaxis: Pantoprazole   Diet: Regular   Activity: IAT  45 y/o female with a PMHX of PE (on warfarin), Lupus,  RA, gastritis and ectopic pregnancy s/p L salpingectomy  who presents to the hospital for evaluation of a 1 month history of intractable abdominal pain. Patient reports that the pain started in early December and has recently been associated with vomiting NBNB emesis and decreased PO intake. She denies any fever, chills, headache, shortness of breath, diarrhea or dysuria .Of note patient has been taking low dose prednisone for the past 7 years after her last lupus flare.     #Suspected gastritis 2/2 to chronic steroid use   #chronic abdominal pain  #chest pain  -patient has been taking low dose prednisone for 7 years (currently on 5mg daily)  -epigastric pain>1 month in duration not associated with meals or viral prodrome   -c/w carafate   -c/w home percocet & ativan/lyrica  for anxiety  -S/P EGD on 1/9. Normal esophagus. Erosive gastritis and duodenitis. f/u biopsy, outpatient GI f/u  -Continue Protonix 40 mg po q 12h for 8 weeks, then 40 mg po daily  -cont Sucralfate  -Still having N/V. Add Reglan 5 mg po half an hour before meals  -Diet currently Full Liquid, dietitian recommends supplementation Ensure clear 3x/day (240 kcal, 8 g protein per serving).   -Pending improvement oral intake for DC, will upgrade today if patient agreeable to solid diet  - Repeat serum HCG in 72 hours; HCG today 6.3 (indeterminate)    #Hx of PE  -daily INR   -warfarin dosing as per pharmacy   -check INR today  -can consider transition to eliquis as patient was never started on it     #Hx of Lupus  #Hx of RA  -follows with Dr. Sim Sharpe  -no reported recent flairs   -Decreased Prednisone dose to 2 mg po daily    #MISC  DVT prophylaxis: warfarin   GI prophylaxis: Pantoprazole   Diet: Regular   Activity: IAT

## 2025-01-12 NOTE — PROGRESS NOTE ADULT - ASSESSMENT
43 y/o female with a PMHX of PE (on warfarin), Lupus,  RA, gastritis and ectopic pregnancy s/p L salpingectomy  who presents to the hospital for evaluation of a 1 month history of intractable abdominal pain. Patient reports that the pain started in early December and has recently been associated with vomiting NBNB emesis and decreased PO intake. She denies any fever, chills, headache, shortness of breath, diarrhea or dysuria .Of note patient has been taking low dose prednisone for the past 7 years after her last lupus flare.     #Suspected gastritis 2/2 to chronic steroid use   #chronic abdominal pain  #chest pain  -patient has been taking low dose prednisone for 7 years (currently on 5mg daily)  -epigastric pain>1 month in duration not associated with meals or viral prodrome   -c/w carafate   -c/w home percocet & ativan/lyrica  for anxiety  -S/P EGD on 1/9. Normal esophagus. Erosive gastritis and duodenitis. f/u biopsy, outpatient GI f/u  -GI recommended Protonix 40 mg po q 12h for 8 weeks, then 40 mg po daily  -cont Sucralfate  -Still having N/V. Add Reglan 5 mg po half an hour before meals, for d/c zofran ODT   -Diet currently Full Liquid, dietitian recommends supplementation Ensure clear 3x/day (240 kcal, 8 g protein per serving).   -Pending improvement oral intake for DC, will upgrade today if patient agreeable to solid diet    #Hx of PE  -daily INR   -warfarin dosing as per pharmacy   -check INR today  -can consider transition to eliquis as patient was never started on it     #Hx of Lupus  #Hx of RA  -follows with Dr. Sim Sharpe  -no reported recent flairs   -Decreased Prednisone dose to 2 mg po daily    #MISC  DVT prophylaxis: warfarin   GI prophylaxis: Pantoprazole   Diet: Regular   Activity: IAT    45 y/o female with a PMHX of PE (on warfarin), Lupus,  RA, gastritis and ectopic pregnancy s/p L salpingectomy  who presents to the hospital for evaluation of a 1 month history of intractable abdominal pain. Patient reports that the pain started in early December and has recently been associated with vomiting NBNB emesis and decreased PO intake. She denies any fever, chills, headache, shortness of breath, diarrhea or dysuria .Of note patient has been taking low dose prednisone for the past 7 years after her last lupus flare.     #Suspected gastritis 2/2 to chronic steroid use   #chronic abdominal pain  #chest pain  -patient has been taking low dose prednisone for 7 years (currently on 5mg daily)  -epigastric pain>1 month in duration not associated with meals or viral prodrome   -c/w carafate   -c/w home percocet & ativan/lyrica  for anxiety  -S/P EGD on 1/9. Normal esophagus. Erosive gastritis and duodenitis. f/u biopsy, outpatient GI f/u  -GI recommended Protonix 40 mg po q 12h for 8 weeks, then 40 mg po daily  -cont Sucralfate  -Still having N/V. Add Reglan 5 mg po half an hour before meals, for d/c zofran ODT   -Diet currently Full Liquid, dietitian recommends supplementation Ensure clear 3x/day (240 kcal, 8 g protein per serving).   -Pending improvement oral intake for DC, will upgrade today if patient agreeable to solid diet    #Hx of PE  -daily INR   -warfarin dosing as per pharmacy   -based on INR today, will hold coumadin and recheck in AM  -can consider transition to eliquis as patient was never started on it     #Hx of Lupus  #Hx of RA  -follows with Dr. Sim Sharpe  -no reported recent flairs   -Decreased Prednisone dose to 2 mg po daily    #MISC  DVT prophylaxis: warfarin   GI prophylaxis: Pantoprazole   Diet: Regular   Activity: IAT

## 2025-01-13 LAB
INR BLD: 1.97 RATIO — HIGH (ref 0.65–1.3)
PROTHROM AB SERPL-ACNC: 23.6 SEC — HIGH (ref 9.95–12.87)

## 2025-01-13 PROCEDURE — 99233 SBSQ HOSP IP/OBS HIGH 50: CPT

## 2025-01-13 RX ORDER — WARFARIN SODIUM 2 MG/1
10 TABLET ORAL ONCE
Refills: 0 | Status: COMPLETED | OUTPATIENT
Start: 2025-01-13 | End: 2025-01-13

## 2025-01-13 RX ORDER — WARFARIN SODIUM 2 MG/1
10 TABLET ORAL ONCE
Refills: 0 | Status: DISCONTINUED | OUTPATIENT
Start: 2025-01-13 | End: 2025-01-13

## 2025-01-13 RX ADMIN — SUCRALFATE 1 GRAM(S): 1 SUSPENSION ORAL at 06:16

## 2025-01-13 RX ADMIN — PREGABALIN CAPSULES, CV 75 MILLIGRAM(S): 225 CAPSULE ORAL at 06:16

## 2025-01-13 RX ADMIN — WARFARIN SODIUM 10 MILLIGRAM(S): 2 TABLET ORAL at 21:01

## 2025-01-13 RX ADMIN — CALCIUM ACETATE 667 MILLIGRAM(S): 667 CAPSULE ORAL at 08:49

## 2025-01-13 RX ADMIN — PANTOPRAZOLE 40 MILLIGRAM(S): 20 TABLET, DELAYED RELEASE ORAL at 18:44

## 2025-01-13 RX ADMIN — ONDANSETRON 4 MILLIGRAM(S): 4 TABLET, ORALLY DISINTEGRATING ORAL at 17:48

## 2025-01-13 RX ADMIN — ONDANSETRON 4 MILLIGRAM(S): 4 TABLET, ORALLY DISINTEGRATING ORAL at 00:42

## 2025-01-13 RX ADMIN — SUCRALFATE 1 GRAM(S): 1 SUSPENSION ORAL at 13:00

## 2025-01-13 RX ADMIN — METOCLOPRAMIDE 5 MILLIGRAM(S): 10 TABLET ORAL at 06:16

## 2025-01-13 RX ADMIN — CALCIUM ACETATE 667 MILLIGRAM(S): 667 CAPSULE ORAL at 18:44

## 2025-01-13 RX ADMIN — PREDNISONE 2 MILLIGRAM(S): 5 TABLET ORAL at 06:16

## 2025-01-13 RX ADMIN — Medication 0.5 MILLIGRAM(S): at 15:01

## 2025-01-13 RX ADMIN — CALCIUM ACETATE 667 MILLIGRAM(S): 667 CAPSULE ORAL at 12:59

## 2025-01-13 RX ADMIN — Medication 1 MILLIGRAM(S): at 12:59

## 2025-01-13 RX ADMIN — POLYETHYLENE GLYCOL 3350 17 GRAM(S): 17 POWDER, FOR SOLUTION ORAL at 13:00

## 2025-01-13 RX ADMIN — ONDANSETRON 4 MILLIGRAM(S): 4 TABLET, ORALLY DISINTEGRATING ORAL at 08:42

## 2025-01-13 RX ADMIN — METOCLOPRAMIDE 5 MILLIGRAM(S): 10 TABLET ORAL at 13:00

## 2025-01-13 RX ADMIN — PREGABALIN CAPSULES, CV 75 MILLIGRAM(S): 225 CAPSULE ORAL at 18:44

## 2025-01-13 RX ADMIN — METOCLOPRAMIDE 5 MILLIGRAM(S): 10 TABLET ORAL at 21:01

## 2025-01-13 RX ADMIN — SUCRALFATE 1 GRAM(S): 1 SUSPENSION ORAL at 18:44

## 2025-01-13 RX ADMIN — PANTOPRAZOLE 40 MILLIGRAM(S): 20 TABLET, DELAYED RELEASE ORAL at 06:16

## 2025-01-13 NOTE — PROGRESS NOTE ADULT - SUBJECTIVE AND OBJECTIVE BOX
DAMIAN PRESLEY  44y Female    CHIEF COMPLAINT:    Patient is a 44y old  Female who presents with a chief complaint of Intractable abdominal pain (12 Jan 2025 12:57)      INTERVAL HPI/OVERNIGHT EVENTS:    Patient seen and examined. Pt keeps complaining that she has persistent N/V for the last one month and cannot keep down anything.     ROS: All other systems are negative.    Vital Signs:    T(F): 98.4 (01-13-25 @ 04:42), Max: 98.4 (01-13-25 @ 04:42)  HR: 72 (01-13-25 @ 04:42) (56 - 72)  BP: 111/75 (01-13-25 @ 04:39) (111/75 - 137/89)  RR: 17 (01-13-25 @ 04:39) (17 - 18)  SpO2: 96% (01-13-25 @ 04:39) (96% - 96%)  I&O's Summary    Daily     Daily   CAPILLARY BLOOD GLUCOSE          PHYSICAL EXAM:    GENERAL:  NAD  SKIN: No rashes or lesions  HENT: Atraumatic. Normocephalic. PERRL. Moist membranes.  NECK: Supple, No JVD. No lymphadenopathy.  PULMONARY: CTA B/L. No wheezing. No rales  CVS: Normal S1, S2. Rate and Rhythm are regular. No murmurs.  ABDOMEN/GI: Soft, Nontender, Nondistended; BS present  EXTREMITIES: Peripheral pulses intact. No edema B/L LE.  NEUROLOGIC:  No motor or sensory deficit.  PSYCH: Alert & oriented x 3    Consultant(s) Notes Reviewed:  [x ] YES  [ ] NO  Care Discussed with Consultants/Other Providers [ x] YES  [ ] NO    EKG reviewed  Telemetry reviewed    LABS:          PT/INR - ( 13 Jan 2025 05:37 )   PT: 23.60 sec;   INR: 1.97 ratio                   RADIOLOGY & ADDITIONAL TESTS:      Imaging or report Personally Reviewed:  [ ] YES  [ ] NO    Medications:  Standing  calcium acetate 667 milliGRAM(s) Oral three times a day with meals  DULoxetine 60 milliGRAM(s) Oral daily  folic acid 1 milliGRAM(s) Oral daily  metoclopramide 5 milliGRAM(s) Oral three times a day  pantoprazole    Tablet 40 milliGRAM(s) Oral two times a day  polyethylene glycol 3350 17 Gram(s) Oral daily  predniSONE   Tablet 2 milliGRAM(s) Oral daily  pregabalin 75 milliGRAM(s) Oral two times a day  sucralfate 1 Gram(s) Oral every 6 hours  warfarin 10 milliGRAM(s) Oral once    PRN Meds  ALPRAZolam 0.5 milliGRAM(s) Oral every 12 hours PRN  ondansetron Injectable 4 milliGRAM(s) IV Push every 8 hours PRN  oxycodone    5 mG/acetaminophen 325 mG 2 Tablet(s) Oral every 8 hours PRN  oxycodone    5 mG/acetaminophen 325 mG 1 Tablet(s) Oral every 6 hours PRN      Case discussed with resident    Care discussed with pt/family

## 2025-01-13 NOTE — PHARMACOTHERAPY INTERVENTION NOTE - COMMENTS
44yFemale    Indication: PE, history of Lupus -   INR Goal: 2-3  Home Dose: 10 mg daily   Bridge Therapy:     Current Medications:  ALPRAZolam 0.5 milliGRAM(s) Oral every 12 hours PRN  calcium acetate 667 milliGRAM(s) Oral three times a day with meals  folic acid 1 milliGRAM(s) Oral daily  metoclopramide 5 milliGRAM(s) Oral three times a day  ondansetron Injectable 4 milliGRAM(s) IV Push every 8 hours PRN  oxycodone    5 mG/acetaminophen 325 mG 2 Tablet(s) Oral every 8 hours PRN  oxycodone    5 mG/acetaminophen 325 mG 1 Tablet(s) Oral every 6 hours PRN  pantoprazole    Tablet 40 milliGRAM(s) Oral two times a day  polyethylene glycol 3350 17 Gram(s) Oral daily  predniSONE   Tablet 2 milliGRAM(s) Oral daily  pregabalin 75 milliGRAM(s) Oral two times a day  sucralfate 1 Gram(s) Oral every 6 hours  warfarin 10 milliGRAM(s) Oral once      hemoglobin 13.4 g/dL (01-08-25 @ 08:01)    hematocrit 41.6 % (01-08-25 @ 08:01)    PLT:   GFR:108 mL/min/1.73m2 (01-08-25 @ 08:01)      Drug Interactions:      INR trend  4.65 ratio (01-07-25 @ 02:06)  3.78 ratio (01-08-25 @ 08:01)  2.63 ratio (01-09-25 @ 07:20)  1.94 ratio (01-10-25 @ 13:33)  2.24 ratio (01-12-25 @ 16:13)  1.97 ratio (01-13-25 @ 05:37)      Warfarin administration history:  warfarin: 7.5 milliGRAM(s) (01-09-25 @ 21:11)  warfarin: 10 milliGRAM(s) (01-10-25 @ 22:11)        1. INR today is:               [ x  ] below goal ----- This is likely due to held dose on 1/11                                             [   ] at goal                                            [   ] above goal ------ This is likely due to        2. Agree with provider ordered Warfarin     10 mg      PO x 1 . Patient was supratherapeutic upon admission, unclear if due to N, V. May need dose reduction after load of 10 mg. Per Yvon Conner, HCG elevated due to etopic pregnancy that is not active.    3. Obtain INR tomorrow AM     44yFemale    Indication: PE, history of Lupus -   INR Goal: 2-3  Home Dose: 10 mg daily   Bridge Therapy:     Current Medications:  ALPRAZolam 0.5 milliGRAM(s) Oral every 12 hours PRN  calcium acetate 667 milliGRAM(s) Oral three times a day with meals  folic acid 1 milliGRAM(s) Oral daily  metoclopramide 5 milliGRAM(s) Oral three times a day  ondansetron Injectable 4 milliGRAM(s) IV Push every 8 hours PRN  oxycodone    5 mG/acetaminophen 325 mG 2 Tablet(s) Oral every 8 hours PRN  oxycodone    5 mG/acetaminophen 325 mG 1 Tablet(s) Oral every 6 hours PRN  pantoprazole    Tablet 40 milliGRAM(s) Oral two times a day  polyethylene glycol 3350 17 Gram(s) Oral daily  predniSONE   Tablet 2 milliGRAM(s) Oral daily  pregabalin 75 milliGRAM(s) Oral two times a day  sucralfate 1 Gram(s) Oral every 6 hours  warfarin 10 milliGRAM(s) Oral once      hemoglobin 13.4 g/dL (01-08-25 @ 08:01)    hematocrit 41.6 % (01-08-25 @ 08:01)    PLT:   GFR:108 mL/min/1.73m2 (01-08-25 @ 08:01)      Drug Interactions:      INR trend  4.65 ratio (01-07-25 @ 02:06)  3.78 ratio (01-08-25 @ 08:01)  2.63 ratio (01-09-25 @ 07:20)  1.94 ratio (01-10-25 @ 13:33)  2.24 ratio (01-12-25 @ 16:13)  1.97 ratio (01-13-25 @ 05:37)      Warfarin administration history:  warfarin: 7.5 milliGRAM(s) (01-09-25 @ 21:11)  warfarin: 10 milliGRAM(s) (01-10-25 @ 22:11)        1. INR today is:               [ x  ] below goal ----- This is likely due to held dose on 1/11 and 1/12                                            [   ] at goal                                            [   ] above goal ------ This is likely due to        2. Agree with provider ordered Warfarin     10 mg      PO x 1 . Patient was supratherapeutic upon admission, unclear if due to N, V. May need dose reduction after load of 10 mg. Per Yvon Conner, HCG elevated due to ectopic pregnancy that is not active.    3. Obtain INR tomorrow AM

## 2025-01-13 NOTE — PROGRESS NOTE ADULT - ASSESSMENT
45 y/o female with a PMHX of PE (on warfarin), Lupus, RA, gastritis and ectopic pregnancy s/p L salpingectomy  who presents to the hospital for evaluation of a 1 month history of chest pain/back pain. GI consulted for N/V. Pt has NBNB emesis and decreased PO intake for almost a month now. Denies any  She denies any melena, hematochezia, weight loss, diarrhea, or constipation.     # Nausea/ Vomiting foudn to have erosive gastritis / duodenitis   DDx include gastritis vs medication induced prednisone and percocet) vs gastroparesis   #constipation   - on Prednisone low dose for lupus   - EGD 2015 done for vomiting showed gastritis   - Hgb 14   - CT AP noted as above  - Abdominal exam benign   - On Coumadin for PE   - patient states she has not had a bowel movement in several days. States she is usually constipated, on daily opiates.     EGD 1/9: erosive gastritis, duodenitis. HP negative     Plan   - Obtain RUQUS   - Treat constipation; standing miralax BID, dulcolax qd and senna qhs    - conservative treatment for now with PPI PO BID for 8 weeks  and Carafate 1G QID  - antiemetics / IVF as needed   - workup of elevated bHCG  - advance diet as tolerated   - Avoid NSAIDs and opiates, consider alternatives in pain control   - Smaller more frequent meals   - Will consider outpatient gastric emptying study   - Follow up with our [facility] located at [address] [place] [address]. Phone Number: [phone]     43 y/o female with a PMHX of PE (on warfarin), Lupus, RA, gastritis and ectopic pregnancy s/p L salpingectomy  who presents to the hospital for evaluation of a 1 month history of chest pain/back pain. GI consulted for N/V. Pt has NBNB emesis and decreased PO intake for almost a month now. Denies any  She denies any melena, hematochezia, weight loss, diarrhea, or constipation.     # Nausea/ Vomiting foudn to have erosive gastritis / duodenitis   DDx include gastritis vs medication induced prednisone and percocet) vs gastroparesis   #constipation   - on Prednisone low dose for lupus   - EGD 2015 done for vomiting showed gastritis   - Hgb 14   - CT AP noted as above  - Abdominal exam benign   - On Coumadin for PE   - patient states she has not had a bowel movement in several days. States she is usually constipated, on daily opiates.     EGD 1/9: erosive gastritis, duodenitis. HP negative     Plan   - Obtain RUQUS   - Treat constipation; standing miralax BID, dulcolax qd and senna qhs    - conservative treatment for now with PPI PO BID for 8 weeks  and Carafate 1G QID  - antiemetics / IVF as needed   - workup of elevated bHCG  - advance diet as tolerated   - Avoid NSAIDs and opiates, consider alternatives in pain control   - Smaller more frequent meals   - Will consider outpatient gastric emptying study   - Strict Cannibis cessation  - Follow up with our [facility] located at [address] [place] [address]. Phone Number: [phone]     43 y/o female with a PMHX of PE (on warfarin), Lupus, RA, gastritis and ectopic pregnancy s/p L salpingectomy  who presents to the hospital for evaluation of a 1 month history of chest pain/back pain. GI consulted for N/V. Pt has NBNB emesis and decreased PO intake for almost a month now. Denies any  She denies any melena, hematochezia, weight loss, diarrhea, or constipation.     # Nausea/ Vomiting foudn to have erosive gastritis / duodenitis   DDx include gastritis vs medication induced prednisone and percocet) vs gastroparesis   #constipation   - on Prednisone low dose for lupus   - EGD 2015 done for vomiting showed gastritis   - Hgb 14   - CT AP noted as above  - Abdominal exam benign   - On Coumadin for PE   - patient states she has not had a bowel movement in several days. States she is usually constipated, on daily opiates.     EGD 1/9: erosive gastritis, duodenitis. HP negative     Plan   - Obtain RUQ US   - Treat constipation; standing miralax BID, dulcolax qd and senna qhs    - conservative treatment for now with PPI PO BID for 8 weeks  and Carafate 1G QID  - antiemetics / IVF as needed   - workup of elevated bHCG  - advance diet as tolerated   - Avoid NSAIDs and opiates, consider alternatives in pain control   - Smaller more frequent meals   - Will consider outpatient gastric emptying study   - Strict Cannibis cessation  - Follow up with our [facility] located at [address] [place] [address]. Phone Number: [phone]

## 2025-01-13 NOTE — PROGRESS NOTE ADULT - SUBJECTIVE AND OBJECTIVE BOX
Gastroenterology progress note:     Patient is a 44y old  Female who presents with a chief complaint of Intractable abdominal pain (13 Jan 2025 15:37)       Admitted on: 01-07-25    We are following the patient for:  N/V    Interval History:    Patient with persistent emesis.  EGD last week showing erosive gastritis/duodenitis   On percocet daily   States has not had bowel movement in several days       PAST MEDICAL & SURGICAL HISTORY:  Lupus (systemic lupus erythematosus)      Pulmonary embolism      Fibromyalgia      Gastritis      Rheumatoid arthritis      H/O abdominal surgery          MEDICATIONS  (STANDING):  calcium acetate 667 milliGRAM(s) Oral three times a day with meals  folic acid 1 milliGRAM(s) Oral daily  metoclopramide 5 milliGRAM(s) Oral three times a day  pantoprazole    Tablet 40 milliGRAM(s) Oral two times a day  polyethylene glycol 3350 17 Gram(s) Oral daily  predniSONE   Tablet 2 milliGRAM(s) Oral daily  pregabalin 75 milliGRAM(s) Oral two times a day  sucralfate 1 Gram(s) Oral every 6 hours  warfarin 10 milliGRAM(s) Oral once    MEDICATIONS  (PRN):  ALPRAZolam 0.5 milliGRAM(s) Oral every 12 hours PRN Anxiety  ondansetron Injectable 4 milliGRAM(s) IV Push every 8 hours PRN Nausea and/or Vomiting  oxycodone    5 mG/acetaminophen 325 mG 2 Tablet(s) Oral every 8 hours PRN Severe Pain (7 - 10)  oxycodone    5 mG/acetaminophen 325 mG 1 Tablet(s) Oral every 6 hours PRN Moderate Pain (4 - 6)      Allergies  No Known Drug Allergies  Tomatoes (Hives)  &quot;cold plasma&quot; (Hives)      Review of Systems:   Cardiovascular:  No Chest Pain, No Palpitations  Respiratory:  No Cough, No Dyspnea  Gastrointestinal:  As described in HPI  Skin:  No Skin Lesions, No Jaundice  Neuro:  No Syncope, No Dizziness    Physical Examination:  T(C): 36.7 (01-13-25 @ 12:50), Max: 36.9 (01-13-25 @ 04:42)  HR: 60 (01-13-25 @ 12:50) (60 - 72)  BP: 137/84 (01-13-25 @ 12:50) (111/75 - 137/89)  RR: 18 (01-13-25 @ 12:50) (17 - 18)  SpO2: 98% (01-13-25 @ 12:50) (96% - 98%)        GENERAL: AAOx3, no acute distress.  HEAD:  Atraumatic, Normocephalic  EYES: conjunctiva and sclera clear  NECK: Supple, no JVD or thyromegaly  CHEST/LUNG: Clear to auscultation bilaterally; No wheeze, rhonchi, or rales  HEART: Regular rate and rhythm; normal S1, S2, No murmurs.  ABDOMEN: Soft, nontender, nondistended; Bowel sounds present  NEUROLOGY: No asterixis or tremor.   SKIN: Intact, no jaundice     Data:    Hgb trend:            Liver panel trend:  TBili 0.4   /   AST 17   /   ALT 13   /   AlkP 50   /   Tptn 6.1   /   Alb 4.0    /   DBili --      01-08  TBili 0.4   /   AST 18   /   ALT 13   /   AlkP 54   /   Tptn 6.4   /   Alb 4.0    /   DBili --      01-07  TBili 0.3   /   AST 19   /   ALT 14   /   AlkP 56   /   Tptn 6.8   /   Alb 4.4    /   DBili --      01-07      PT/INR - ( 13 Jan 2025 05:37 )   PT: 23.60 sec;   INR: 1.97 ratio                Radiology:

## 2025-01-13 NOTE — PROGRESS NOTE ADULT - ASSESSMENT
43 y/o female with a PMHX of PE (on warfarin), Lupus,  RA, gastritis and ectopic pregnancy s/p L salpingectomy  who presents to the hospital for evaluation of a 1 month history of intractable abdominal pain. Patient reports that the pain started in early December and has recently been associated with vomiting NBNB emesis and decreased PO intake.     Abdominal pain / Nausea / Vomiting likely due to Erosive gastritis/duodenitis   H/O chronic PE  SLE / RA                 PLAN:    ·	Tele reviewed by me. No events. D/C tele  ·	EKG on admission: NSR 74/min (interpreted by me)  ·	Troponin: <6--> <6  ·	CTA chest is negative for PE  ·	CT abd/pelvis is unremarkable  ·	S/P EGD on 1/9. Normal esophagus. Erosive gastritis and duodenitis.   ·	GI recommended Protonix 40 mg po q 12h for 8 weeks, then 40 mg po daily  ·	Cont Sucralfate  ·	Still having N/V. Cont Reglan 5 mg po half an hour before meals.   ·	Duloxetine and Pregabalin can cause N/V. Hold Duloxetine for now  ·	GI f/u  ·	Cont Prednisone dose 2 mg po daily  ·	INR is 1.97. Give Coumadin 10 mg po tonight and check INR in AM    Progress Note Handoff    Pending (specify):  Consults__GI f/u_______, Tests________, Test Results_______, Other_Nausea/Vomiting________  Family discussion:  Disposition: Home___/SNF___/Other________/Unknown at this time________    Ankit Francois MD  Spectra: [phone]

## 2025-01-13 NOTE — PROGRESS NOTE ADULT - SUBJECTIVE AND OBJECTIVE BOX
DAMIAN PRESLEY 44y Female  MRN#: [id number]     Hospital Day: 6d    Pt is currently admitted with the primary diagnosis of abdominal pain    Overnight events   -No major overnight events  - reports n/v this AM; unable to keep food down                                          ----------------------------------------------------------  OBJECTIVE  PAST MEDICAL & SURGICAL HISTORY  Lupus (systemic lupus erythematosus)    Pulmonary embolism    Fibromyalgia    Gastritis    Rheumatoid arthritis    H/O abdominal surgery                                              -----------------------------------------------------------  MEDICATIONS:  STANDING MEDICATIONS  calcium acetate 667 milliGRAM(s) Oral three times a day with meals  folic acid 1 milliGRAM(s) Oral daily  metoclopramide 5 milliGRAM(s) Oral three times a day  pantoprazole    Tablet 40 milliGRAM(s) Oral two times a day  polyethylene glycol 3350 17 Gram(s) Oral daily  predniSONE   Tablet 2 milliGRAM(s) Oral daily  pregabalin 75 milliGRAM(s) Oral two times a day  sucralfate 1 Gram(s) Oral every 6 hours  warfarin 10 milliGRAM(s) Oral once    PRN MEDICATIONS  ALPRAZolam 0.5 milliGRAM(s) Oral every 12 hours PRN  ondansetron Injectable 4 milliGRAM(s) IV Push every 8 hours PRN  oxycodone    5 mG/acetaminophen 325 mG 2 Tablet(s) Oral every 8 hours PRN  oxycodone    5 mG/acetaminophen 325 mG 1 Tablet(s) Oral every 6 hours PRN                                            ------------------------------------------------------------  VITAL SIGNS: Last 24 Hours  T(C): 36.7 (13 Jan 2025 12:50), Max: 36.9 (13 Jan 2025 04:42)  T(F): 98 (13 Jan 2025 12:50), Max: 98.4 (13 Jan 2025 04:42)  HR: 60 (13 Jan 2025 12:50) (60 - 72)  BP: 137/84 (13 Jan 2025 12:50) (111/75 - 137/89)  BP(mean): 87 (13 Jan 2025 04:39) (87 - 87)  RR: 18 (13 Jan 2025 12:50) (17 - 18)  SpO2: 98% (13 Jan 2025 12:50) (96% - 98%)                                             --------------------------------------------------------------  LABS:          PT/INR - ( 13 Jan 2025 05:37 )   PT: 23.60 sec;   INR: 1.97 ratio                                                                   --------------------------------------------------------------  PHYSICAL EXAM:  GENERAL: Awake, alert, oriented, NAD  HEENT: No FNDs, atraumatic, normocephalic  LUNGS: Clear to auscultation bilaterally  HEART: S1/S2. No heaves or thrills  ABD: minor discomfort/tenderness epigastric, remaining abd soft, non-tender, non-distended.  EXT/NEURO: Strength, sensation and ROM grossly intact.  SKIN: No edema    PLAN:  45 y/o female with a PMHX of PE (on warfarin), Lupus,  RA, gastritis and ectopic pregnancy s/p L salpingectomy  who presents to the hospital for evaluation of a 1 month history of intractable abdominal pain. Patient reports that the pain started in early December and has recently been associated with vomiting NBNB emesis and decreased PO intake. She denies any fever, chills, headache, shortness of breath, diarrhea or dysuria .Of note patient has been taking low dose prednisone for the past 7 years after her last lupus flare.     #Suspected gastritis 2/2 to chronic steroid use   #chronic abdominal pain  #chest pain  -patient has been taking low dose prednisone for 7 years (currently on 5mg daily)  -epigastric pain>1 month in duration not associated with meals or viral prodrome   -c/w carafate   -c/w home percocet & ativan/lyrica  for anxiety  -S/P EGD on 1/9. Normal esophagus. Erosive gastritis and duodenitis. f/u biopsy, outpatient GI f/u  -Continue Protonix 40 mg po q 12h for 8 weeks, then 40 mg po daily  -cont Sucralfate  -Still having N/V. Add Reglan 5 mg po half an hour before meals  -Diet currently Full Liquid, dietitian recommends supplementation Ensure clear 3x/day (240 kcal, 8 g protein per serving).   -Pending improvement oral intake for DC, will upgrade today if patient agreeable to solid diet  -Repeat serum HCG in 72 hours; HCG today 6.3 (indeterminate)  -f/u GI    #Hx of PE  -daily INR   -warfarin dosing as per pharmacy   -INR subtherapeutic today  - warfarin 10mg x1 today > f/u INR     #Hx of Lupus  #Hx of RA  -follows with Dr. Sim Sharpe  -no reported recent flairs   -Decreased Prednisone dose to 2 mg po daily    #MISC  DVT prophylaxis: warfarin   GI prophylaxis: Pantoprazole   Diet: Regular   Activity: IAT

## 2025-01-14 LAB
ANION GAP SERPL CALC-SCNC: 10 MMOL/L — SIGNIFICANT CHANGE UP (ref 7–14)
BASOPHILS # BLD AUTO: 0.06 K/UL — SIGNIFICANT CHANGE UP (ref 0–0.2)
BASOPHILS NFR BLD AUTO: 1.7 % — HIGH (ref 0–1)
BUN SERPL-MCNC: 8 MG/DL — LOW (ref 10–20)
CALCIUM SERPL-MCNC: 9.8 MG/DL — SIGNIFICANT CHANGE UP (ref 8.4–10.5)
CHLORIDE SERPL-SCNC: 101 MMOL/L — SIGNIFICANT CHANGE UP (ref 98–110)
CO2 SERPL-SCNC: 27 MMOL/L — SIGNIFICANT CHANGE UP (ref 17–32)
CREAT SERPL-MCNC: 0.9 MG/DL — SIGNIFICANT CHANGE UP (ref 0.7–1.5)
EGFR: 80 ML/MIN/1.73M2 — SIGNIFICANT CHANGE UP
EOSINOPHIL # BLD AUTO: 0.05 K/UL — SIGNIFICANT CHANGE UP (ref 0–0.7)
EOSINOPHIL NFR BLD AUTO: 1.4 % — SIGNIFICANT CHANGE UP (ref 0–8)
GLUCOSE SERPL-MCNC: 99 MG/DL — SIGNIFICANT CHANGE UP (ref 70–99)
HCT VFR BLD CALC: 44 % — SIGNIFICANT CHANGE UP (ref 37–47)
HGB BLD-MCNC: 14.3 G/DL — SIGNIFICANT CHANGE UP (ref 12–16)
IMM GRANULOCYTES NFR BLD AUTO: 0.3 % — SIGNIFICANT CHANGE UP (ref 0.1–0.3)
INR BLD: 1.49 RATIO — HIGH (ref 0.65–1.3)
LYMPHOCYTES # BLD AUTO: 1.14 K/UL — LOW (ref 1.2–3.4)
LYMPHOCYTES # BLD AUTO: 32.5 % — SIGNIFICANT CHANGE UP (ref 20.5–51.1)
MAGNESIUM SERPL-MCNC: 2 MG/DL — SIGNIFICANT CHANGE UP (ref 1.8–2.4)
MCHC RBC-ENTMCNC: 31.3 PG — HIGH (ref 27–31)
MCHC RBC-ENTMCNC: 32.5 G/DL — SIGNIFICANT CHANGE UP (ref 32–37)
MCV RBC AUTO: 96.3 FL — SIGNIFICANT CHANGE UP (ref 81–99)
MONOCYTES # BLD AUTO: 0.25 K/UL — SIGNIFICANT CHANGE UP (ref 0.1–0.6)
MONOCYTES NFR BLD AUTO: 7.1 % — SIGNIFICANT CHANGE UP (ref 1.7–9.3)
NEUTROPHILS # BLD AUTO: 2 K/UL — SIGNIFICANT CHANGE UP (ref 1.4–6.5)
NEUTROPHILS NFR BLD AUTO: 57 % — SIGNIFICANT CHANGE UP (ref 42.2–75.2)
NRBC # BLD: 0 /100 WBCS — SIGNIFICANT CHANGE UP (ref 0–0)
NRBC BLD-RTO: 0 /100 WBCS — SIGNIFICANT CHANGE UP (ref 0–0)
PLATELET # BLD AUTO: 218 K/UL — SIGNIFICANT CHANGE UP (ref 130–400)
PMV BLD: 10.1 FL — SIGNIFICANT CHANGE UP (ref 7.4–10.4)
POTASSIUM SERPL-MCNC: 4.3 MMOL/L — SIGNIFICANT CHANGE UP (ref 3.5–5)
POTASSIUM SERPL-SCNC: 4.3 MMOL/L — SIGNIFICANT CHANGE UP (ref 3.5–5)
PROTHROM AB SERPL-ACNC: 17.7 SEC — HIGH (ref 9.95–12.87)
RBC # BLD: 4.57 M/UL — SIGNIFICANT CHANGE UP (ref 4.2–5.4)
RBC # FLD: 11.3 % — LOW (ref 11.5–14.5)
SODIUM SERPL-SCNC: 138 MMOL/L — SIGNIFICANT CHANGE UP (ref 135–146)
WBC # BLD: 3.51 K/UL — LOW (ref 4.8–10.8)
WBC # FLD AUTO: 3.51 K/UL — LOW (ref 4.8–10.8)

## 2025-01-14 PROCEDURE — 99233 SBSQ HOSP IP/OBS HIGH 50: CPT

## 2025-01-14 PROCEDURE — 74018 RADEX ABDOMEN 1 VIEW: CPT | Mod: 26

## 2025-01-14 PROCEDURE — 76705 ECHO EXAM OF ABDOMEN: CPT | Mod: 26

## 2025-01-14 RX ORDER — BISACODYL 5 MG
5 TABLET, DELAYED RELEASE (ENTERIC COATED) ORAL AT BEDTIME
Refills: 0 | Status: DISCONTINUED | OUTPATIENT
Start: 2025-01-14 | End: 2025-01-14

## 2025-01-14 RX ORDER — POLYETHYLENE GLYCOL 3350 17 G/17G
17 POWDER, FOR SOLUTION ORAL
Refills: 0 | Status: DISCONTINUED | OUTPATIENT
Start: 2025-01-14 | End: 2025-01-19

## 2025-01-14 RX ORDER — LACTULOSE 10 G/15 ML
20 SOLUTION, ORAL ORAL
Refills: 0 | Status: DISCONTINUED | OUTPATIENT
Start: 2025-01-14 | End: 2025-01-15

## 2025-01-14 RX ORDER — SENNOSIDES 8.6 MG
2 TABLET ORAL AT BEDTIME
Refills: 0 | Status: DISCONTINUED | OUTPATIENT
Start: 2025-01-14 | End: 2025-01-19

## 2025-01-14 RX ORDER — ENOXAPARIN SODIUM 100 MG/ML
90 INJECTION SUBCUTANEOUS EVERY 12 HOURS
Refills: 0 | Status: DISCONTINUED | OUTPATIENT
Start: 2025-01-14 | End: 2025-01-15

## 2025-01-14 RX ORDER — WARFARIN SODIUM 2 MG/1
12.5 TABLET ORAL ONCE
Refills: 0 | Status: COMPLETED | OUTPATIENT
Start: 2025-01-14 | End: 2025-01-14

## 2025-01-14 RX ADMIN — CALCIUM ACETATE 667 MILLIGRAM(S): 667 CAPSULE ORAL at 11:14

## 2025-01-14 RX ADMIN — SUCRALFATE 1 GRAM(S): 1 SUSPENSION ORAL at 23:16

## 2025-01-14 RX ADMIN — POLYETHYLENE GLYCOL 3350 17 GRAM(S): 17 POWDER, FOR SOLUTION ORAL at 07:58

## 2025-01-14 RX ADMIN — SUCRALFATE 1 GRAM(S): 1 SUSPENSION ORAL at 11:11

## 2025-01-14 RX ADMIN — ENOXAPARIN SODIUM 90 MILLIGRAM(S): 100 INJECTION SUBCUTANEOUS at 20:16

## 2025-01-14 RX ADMIN — ONDANSETRON 4 MILLIGRAM(S): 4 TABLET, ORALLY DISINTEGRATING ORAL at 17:38

## 2025-01-14 RX ADMIN — SUCRALFATE 1 GRAM(S): 1 SUSPENSION ORAL at 17:39

## 2025-01-14 RX ADMIN — Medication 20 GRAM(S): at 17:39

## 2025-01-14 RX ADMIN — METOCLOPRAMIDE 5 MILLIGRAM(S): 10 TABLET ORAL at 22:12

## 2025-01-14 RX ADMIN — METOCLOPRAMIDE 5 MILLIGRAM(S): 10 TABLET ORAL at 14:20

## 2025-01-14 RX ADMIN — CALCIUM ACETATE 667 MILLIGRAM(S): 667 CAPSULE ORAL at 07:58

## 2025-01-14 RX ADMIN — POLYETHYLENE GLYCOL 3350 17 GRAM(S): 17 POWDER, FOR SOLUTION ORAL at 17:39

## 2025-01-14 RX ADMIN — METOCLOPRAMIDE 5 MILLIGRAM(S): 10 TABLET ORAL at 05:45

## 2025-01-14 RX ADMIN — WARFARIN SODIUM 12.5 MILLIGRAM(S): 2 TABLET ORAL at 22:12

## 2025-01-14 RX ADMIN — PANTOPRAZOLE 40 MILLIGRAM(S): 20 TABLET, DELAYED RELEASE ORAL at 05:46

## 2025-01-14 RX ADMIN — Medication 20 GRAM(S): at 11:11

## 2025-01-14 RX ADMIN — SUCRALFATE 1 GRAM(S): 1 SUSPENSION ORAL at 05:46

## 2025-01-14 RX ADMIN — PREGABALIN CAPSULES, CV 75 MILLIGRAM(S): 225 CAPSULE ORAL at 05:45

## 2025-01-14 RX ADMIN — ENOXAPARIN SODIUM 90 MILLIGRAM(S): 100 INJECTION SUBCUTANEOUS at 11:11

## 2025-01-14 RX ADMIN — PANTOPRAZOLE 40 MILLIGRAM(S): 20 TABLET, DELAYED RELEASE ORAL at 17:39

## 2025-01-14 RX ADMIN — ONDANSETRON 4 MILLIGRAM(S): 4 TABLET, ORALLY DISINTEGRATING ORAL at 08:26

## 2025-01-14 RX ADMIN — PREGABALIN CAPSULES, CV 75 MILLIGRAM(S): 225 CAPSULE ORAL at 17:39

## 2025-01-14 RX ADMIN — CALCIUM ACETATE 667 MILLIGRAM(S): 667 CAPSULE ORAL at 17:38

## 2025-01-14 RX ADMIN — PREDNISONE 2 MILLIGRAM(S): 5 TABLET ORAL at 05:46

## 2025-01-14 RX ADMIN — Medication 0.5 MILLIGRAM(S): at 11:14

## 2025-01-14 RX ADMIN — Medication 0.5 MILLIGRAM(S): at 23:16

## 2025-01-14 RX ADMIN — Medication 1 MILLIGRAM(S): at 11:10

## 2025-01-14 NOTE — PHARMACOTHERAPY INTERVENTION NOTE - COMMENTS
44yFemale    Indication: hx PE, hx Lupus  INR Goal: 2-3 (confirm with Dr. Davis)  Home Dose:  10 mg daily  Bridge Therapy:enoxaparin Injectable 90 milliGRAM(s) every 12 hours      Current Medications:  ALPRAZolam 0.5 milliGRAM(s) Oral every 12 hours PRN  calcium acetate 667 milliGRAM(s) Oral three times a day with meals  enoxaparin Injectable 90 milliGRAM(s) SubCutaneous every 12 hours  folic acid 1 milliGRAM(s) Oral daily  lactulose Syrup 20 Gram(s) Oral two times a day  metoclopramide 5 milliGRAM(s) Oral three times a day  ondansetron Injectable 4 milliGRAM(s) IV Push every 8 hours PRN  oxycodone    5 mG/acetaminophen 325 mG 1 Tablet(s) Oral every 6 hours PRN  oxycodone    5 mG/acetaminophen 325 mG 2 Tablet(s) Oral every 8 hours PRN  pantoprazole    Tablet 40 milliGRAM(s) Oral two times a day  polyethylene glycol 3350 17 Gram(s) Oral two times a day  predniSONE   Tablet 2 milliGRAM(s) Oral daily  pregabalin 75 milliGRAM(s) Oral two times a day  senna 2 Tablet(s) Oral at bedtime  sucralfate 1 Gram(s) Oral every 6 hours      hemoglobin 13.4 g/dL (01-08-25 @ 08:01)    hematocrit 41.6 % (01-08-25 @ 08:01)    PLT:   GFR:108 mL/min/1.73m2 (01-08-25 @ 08:01)      Drug Interactions:     INR trend  3.78 ratio (01-08-25 @ 08:01)  2.63 ratio (01-09-25 @ 07:20)  1.94 ratio (01-10-25 @ 13:33)  2.24 ratio (01-12-25 @ 16:13)  1.97 ratio (01-13-25 @ 05:37)  1.49 ratio (01-14-25 @ 08:30)      Warfarin administration history:  warfarin: 7.5 milliGRAM(s) (01-09-25 @ 21:11)  warfarin: 10 milliGRAM(s) (01-10-25 @ 22:11)  warfarin: 10 milliGRAM(s) (01-13-25 @ 21:01)        1. INR today is:               [ x  ] below goal ----- This is likely due to holding doses on 1/11 and 1/12                                            [   ] at goal                                            [   ] above goal ------ This is likely due to        2. Recommend Warfarin           PO x 1   3. Obtain INR tomorrow AM     44yFemale    Indication: hx PE, hx Lupus  INR Goal: 2-3 (confirm with Dr. Davis)  Home Dose:  10 mg daily  Bridge Therapy:enoxaparin Injectable 90 milliGRAM(s) every 12 hours      Current Medications:  ALPRAZolam 0.5 milliGRAM(s) Oral every 12 hours PRN  calcium acetate 667 milliGRAM(s) Oral three times a day with meals  enoxaparin Injectable 90 milliGRAM(s) SubCutaneous every 12 hours  folic acid 1 milliGRAM(s) Oral daily  lactulose Syrup 20 Gram(s) Oral two times a day  metoclopramide 5 milliGRAM(s) Oral three times a day  ondansetron Injectable 4 milliGRAM(s) IV Push every 8 hours PRN  oxycodone    5 mG/acetaminophen 325 mG 1 Tablet(s) Oral every 6 hours PRN  oxycodone    5 mG/acetaminophen 325 mG 2 Tablet(s) Oral every 8 hours PRN  pantoprazole    Tablet 40 milliGRAM(s) Oral two times a day  polyethylene glycol 3350 17 Gram(s) Oral two times a day  predniSONE   Tablet 2 milliGRAM(s) Oral daily  pregabalin 75 milliGRAM(s) Oral two times a day  senna 2 Tablet(s) Oral at bedtime  sucralfate 1 Gram(s) Oral every 6 hours      hemoglobin 13.4 g/dL (01-08-25 @ 08:01)    hematocrit 41.6 % (01-08-25 @ 08:01)    PLT:   GFR:108 mL/min/1.73m2 (01-08-25 @ 08:01)      Drug Interactions:     INR trend  3.78 ratio (01-08-25 @ 08:01)  2.63 ratio (01-09-25 @ 07:20)  1.94 ratio (01-10-25 @ 13:33)  2.24 ratio (01-12-25 @ 16:13)  1.97 ratio (01-13-25 @ 05:37)  1.49 ratio (01-14-25 @ 08:30)      Warfarin administration history:  warfarin: 7.5 milliGRAM(s) (01-09-25 @ 21:11)  warfarin: 10 milliGRAM(s) (01-10-25 @ 22:11)  warfarin: 10 milliGRAM(s) (01-13-25 @ 21:01)        1. INR today is:               [ x  ] below goal ----- This is likely due to holding doses on 1/11 and 1/12                                            [   ] at goal                                            [   ] above goal ------ This is likely due to        2. Recommend Warfarin can trial either 10 mg tonight or 12.5 mg load  PO x 1. Dr. Davis approve to trial one dose of 12.5 mg tonight and close monitoring of INR tomorrow    3. Obtain INR tomorrow AM

## 2025-01-14 NOTE — PROGRESS NOTE ADULT - ASSESSMENT
45 y/o female with a PMHX of PE (on warfarin), Lupus,  RA, gastritis and ectopic pregnancy s/p L salpingectomy  who presents to the hospital for evaluation of a 1 month history of intractable abdominal pain. Patient reports that the pain started in early December and has recently been associated with vomiting NBNB emesis and decreased PO intake.     Abdominal pain / Nausea / Vomiting likely due to Erosive gastritis/duodenitis   H/O chronic PE  SLE / RA                 PLAN:    ·	Tele reviewed by me. No events. D/C tele  ·	EKG on admission: NSR 74/min (interpreted by me)  ·	Troponin: <6--> <6  ·	CTA chest is negative for PE  ·	CT abd/pelvis is unremarkable  ·	S/P EGD on 1/9. Normal esophagus. Erosive gastritis and duodenitis.   ·	GI recommended Protonix 40 mg po q 12h for 8 weeks, then 40 mg po daily  ·	Cont Sucralfate  ·	Still having N/V. Cont Reglan 5 mg po half an hour before meals.   ·	Duloxetine and Pregabalin can cause N/V. Hold Duloxetine for now  ·	GI f/u  ·	Cont Prednisone dose 2 mg po daily  ·	INR is 1.97. Give Coumadin 10 mg po tonight and check INR in AM    Progress Note Handoff    Pending (specify):  Consults__GI f/u_______, Tests________, Test Results_______, Other_Nausea/Vomiting________  Family discussion:  Disposition: Home___/SNF___/Other________/Unknown at this time________    Ankit Francois MD  Spectra: [phone] 43 y/o female with a PMHX of PE (on warfarin), Lupus,  RA, gastritis and ectopic pregnancy s/p L salpingectomy  who presents to the hospital for evaluation of a 1 month history of intractable abdominal pain. Patient reports that the pain started in early December and has recently been associated with vomiting NBNB emesis and decreased PO intake.     Abdominal pain / Nausea / Vomiting likely due to Erosive gastritis/duodenitis   H/O chronic PE  SLE / RA                 PLAN:    ·	KUB noted. Diffuse bowel gas pattern. Check official report  ·	RUQ US.   ·	Cont Miralax. Add Senna two tablets qhs and Lactulose 20 gm po q 12h  ·	EKG on admission: NSR 74/min (interpreted by me)  ·	Troponin: <6--> <6  ·	CTA chest is negative for PE  ·	CT abd/pelvis is unremarkable  ·	S/P EGD on 1/9. Normal esophagus. Erosive gastritis and duodenitis.   ·	GI recommended Protonix 40 mg po q 12h for 8 weeks, then 40 mg po daily  ·	Cont Sucralfate  ·	Still having N/V. Cont Reglan 5 mg po half an hour before meals.   ·	Duloxetine and Pregabalin can cause N/V. Hold Duloxetine for now  ·	GI f/u  ·	Cont Prednisone dose 2 mg po daily  ·	INR is 1.49. Bridge with Lovenox 90 mg sq q 12h and give Coumadin as per pharmacy recommendation and check INR in AM    Progress Note Handoff    Pending (specify):  Consults__GI f/u_______, Tests________, Test Results_______, Other_Nausea/Vomiting________  Family discussion:  Disposition: Home___/SNF___/Other________/Unknown at this time________    Ankit Francois MD  Spectra: [phone]

## 2025-01-14 NOTE — PROGRESS NOTE ADULT - ASSESSMENT
43 y/o female with a PMHX of PE (on warfarin), Lupus,  RA, gastritis and ectopic pregnancy s/p L salpingectomy  who presents to the hospital for evaluation of a 1 month history of intractable abdominal pain. Patient reports that the pain started in early December and has recently been associated with vomiting NBNB emesis and decreased PO intake. She denies any fever, chills, headache, shortness of breath, diarrhea or dysuria .Of note patient has been taking low dose prednisone for the past 7 years after her last lupus flare.     #Suspected gastritis 2/2 to chronic steroid use   #chronic abdominal pain  #chest pain  -patient has been taking low dose prednisone for 7 years (currently on 5mg daily)  -epigastric pain>1 month in duration not associated with meals or viral prodrome   -c/w carafate   -c/w home percocet & ativan/lyrica  for anxiety  -S/P EGD on 1/9. Normal esophagus. Erosive gastritis and duodenitis. f/u biopsy, outpatient GI f/u  -Continue Protonix 40 mg po q 12h for 8 weeks, then 40 mg po daily  -cont Sucralfate  -Still having N/V. Add Reglan 5 mg po half an hour before meals  -Diet currently Full Liquid, dietitian recommends supplementation Ensure clear 3x/day (240 kcal, 8 g protein per serving).   -Pending improvement oral intake for DC  -Repeat serum HCG in 72 hours; Last HCG  6.3 (indeterminate)  -Gi following: Fu RUQ US. bowel regimen for constipation    #Hx of PE  -daily INR   -warfarin dosing as per pharmacy   -INR subtherapeutic today  - warfarin 12.5mg x1 today > f/u INR     #Hx of Lupus  #Hx of RA  -follows with Dr. Sim Sharpe  -no reported recent flairs   -Decreased Prednisone dose to 2 mg po daily    #MISC  DVT prophylaxis: warfarin   GI prophylaxis: Pantoprazole   Diet: Regular   Activity: IAT

## 2025-01-14 NOTE — PROGRESS NOTE ADULT - SUBJECTIVE AND OBJECTIVE BOX
FERNANDEZ DAMIAN  44y Female    CHIEF COMPLAINT:    Patient is a 44y old  Female who presents with a chief complaint of Intractable abdominal pain (13 Jan 2025 16:37)      INTERVAL HPI/OVERNIGHT EVENTS:    Patient seen and examined.    ROS: All other systems are negative.    Vital Signs:    T(F): 97.8 (01-14-25 @ 05:07), Max: 98 (01-13-25 @ 12:50)  HR: 76 (01-14-25 @ 05:07) (60 - 76)  BP: 114/76 (01-14-25 @ 05:07) (114/76 - 137/84)  RR: 18 (01-13-25 @ 12:50) (18 - 18)  SpO2: 98% (01-13-25 @ 12:50) (98% - 98%)  I&O's Summary    13 Jan 2025 07:01  -  14 Jan 2025 07:00  --------------------------------------------------------  IN: 100 mL / OUT: 200 mL / NET: -100 mL      Daily     Daily   CAPILLARY BLOOD GLUCOSE          PHYSICAL EXAM:    GENERAL:  NAD  SKIN: No rashes or lesions  HENT: Atraumatic. Normocephalic. PERRL. Moist membranes.  NECK: Supple, No JVD. No lymphadenopathy.  PULMONARY: CTA B/L. No wheezing. No rales  CVS: Normal S1, S2. Rate and Rhythm are regular. No murmurs.  ABDOMEN/GI: Soft, Nontender, Nondistended; BS present  EXTREMITIES: Peripheral pulses intact. No edema B/L LE.  NEUROLOGIC:  No motor or sensory deficit.  PSYCH: Alert & oriented x 3    Consultant(s) Notes Reviewed:  [x ] YES  [ ] NO  Care Discussed with Consultants/Other Providers [ x] YES  [ ] NO    EKG reviewed  Telemetry reviewed    LABS:          PT/INR - ( 13 Jan 2025 05:37 )   PT: 23.60 sec;   INR: 1.97 ratio                   RADIOLOGY & ADDITIONAL TESTS:      Imaging or report Personally Reviewed:  [ ] YES  [ ] NO    Medications:  Standing  bisacodyl 5 milliGRAM(s) Oral at bedtime  calcium acetate 667 milliGRAM(s) Oral three times a day with meals  folic acid 1 milliGRAM(s) Oral daily  metoclopramide 5 milliGRAM(s) Oral three times a day  pantoprazole    Tablet 40 milliGRAM(s) Oral two times a day  polyethylene glycol 3350 17 Gram(s) Oral two times a day  predniSONE   Tablet 2 milliGRAM(s) Oral daily  pregabalin 75 milliGRAM(s) Oral two times a day  senna 2 Tablet(s) Oral at bedtime  sucralfate 1 Gram(s) Oral every 6 hours    PRN Meds  ALPRAZolam 0.5 milliGRAM(s) Oral every 12 hours PRN  ondansetron Injectable 4 milliGRAM(s) IV Push every 8 hours PRN  oxycodone    5 mG/acetaminophen 325 mG 2 Tablet(s) Oral every 8 hours PRN  oxycodone    5 mG/acetaminophen 325 mG 1 Tablet(s) Oral every 6 hours PRN      Case discussed with resident    Care discussed with pt/family           FERNANDEZ DAMIAN  44y Female    CHIEF COMPLAINT:    Patient is a 44y old  Female who presents with a chief complaint of Intractable abdominal pain (13 Jan 2025 16:37)      INTERVAL HPI/OVERNIGHT EVENTS:    Patient seen and examined. Complains that she has not moved her bowel for the last one week and have diffuse abdominal discomfort. Pt states that she still has N/V and is unable to keep down anything    ROS: All other systems are negative.    Vital Signs:    T(F): 97.8 (01-14-25 @ 05:07), Max: 98 (01-13-25 @ 12:50)  HR: 76 (01-14-25 @ 05:07) (60 - 76)  BP: 114/76 (01-14-25 @ 05:07) (114/76 - 137/84)  RR: 18 (01-13-25 @ 12:50) (18 - 18)  SpO2: 98% (01-13-25 @ 12:50) (98% - 98%)  I&O's Summary    13 Jan 2025 07:01  -  14 Jan 2025 07:00  --------------------------------------------------------  IN: 100 mL / OUT: 200 mL / NET: -100 mL      Daily     Daily   CAPILLARY BLOOD GLUCOSE          PHYSICAL EXAM:    GENERAL:  NAD  SKIN: No rashes or lesions  HENT: Atraumatic. Normocephalic. PERRL. Moist membranes.  NECK: Supple, No JVD. No lymphadenopathy.  PULMONARY: CTA B/L. No wheezing. No rales  CVS: Normal S1, S2. Rate and Rhythm are regular. No murmurs.  ABDOMEN/GI: Soft, mild diffuse tenderness, Nondistended; BS present  EXTREMITIES: Peripheral pulses intact. No edema B/L LE.  NEUROLOGIC:  No motor or sensory deficit.  PSYCH: Alert & oriented x 3    Consultant(s) Notes Reviewed:  [x ] YES  [ ] NO  Care Discussed with Consultants/Other Providers [ x] YES  [ ] NO    EKG reviewed  Telemetry reviewed    LABS:          PT/INR - ( 13 Jan 2025 05:37 )   PT: 23.60 sec;   INR: 1.97 ratio                   RADIOLOGY & ADDITIONAL TESTS:    < from: US Transvaginal (01.12.25 @ 09:35) >    IMPRESSION:  Uterine fibroids measuring up to 3 cm    No intrauterine gestation. No adnexal masses.    < end of copied text >    Imaging or report Personally Reviewed:  [x ] YES  [ ] NO    Medications:  Standing  bisacodyl 5 milliGRAM(s) Oral at bedtime  calcium acetate 667 milliGRAM(s) Oral three times a day with meals  folic acid 1 milliGRAM(s) Oral daily  metoclopramide 5 milliGRAM(s) Oral three times a day  pantoprazole    Tablet 40 milliGRAM(s) Oral two times a day  polyethylene glycol 3350 17 Gram(s) Oral two times a day  predniSONE   Tablet 2 milliGRAM(s) Oral daily  pregabalin 75 milliGRAM(s) Oral two times a day  senna 2 Tablet(s) Oral at bedtime  sucralfate 1 Gram(s) Oral every 6 hours    PRN Meds  ALPRAZolam 0.5 milliGRAM(s) Oral every 12 hours PRN  ondansetron Injectable 4 milliGRAM(s) IV Push every 8 hours PRN  oxycodone    5 mG/acetaminophen 325 mG 2 Tablet(s) Oral every 8 hours PRN  oxycodone    5 mG/acetaminophen 325 mG 1 Tablet(s) Oral every 6 hours PRN      Case discussed with resident    Care discussed with pt/family

## 2025-01-14 NOTE — PROGRESS NOTE ADULT - ASSESSMENT
43 y/o female with a PMHX of PE (on warfarin), Lupus, RA, gastritis and ectopic pregnancy s/p L salpingectomy  who presents to the hospital for evaluation of a 1 month history of chest pain/back pain. GI consulted for N/V. Pt has NBNB emesis and decreased PO intake for almost a month now. Denies any  She denies any melena, hematochezia, weight loss, diarrhea, or constipation.     # Nausea/ Vomiting foudn to have erosive gastritis / duodenitis   Likely multifactorial in setting of gastritis vs duodenitis vs medication induced (chronic prednisone and percocet) vs gastroparesis vs chronic marijuana use vs constipation  #constipation   - on Prednisone low dose for lupus   - EGD 2015 done for vomiting showed gastritis   - Hgb 14   - CT AP noted as above  - Abdominal exam benign   - On Coumadin for PE   - patient states she has not had a bowel movement in several days. States she is usually constipated, on daily opiates.     EGD 1/9: erosive gastritis, duodenitis. HP negative     RUQUS WNL  Repeat labs WNL    Plan   - Treat constipation; standing miralax BID, dulcolax qd and senna qhs    - Would DC lactulose as can cause bloating and GI upset   - If no improvement in BMs will consider movantik  - Obtain gastric emptying study   - Smaller more frequent meals   - conservative treatment for now with PPI PO BID for 8 weeks and Carafate 1G QID  - antiemetics / IVF as needed   - workup of elevated bHCG  - advance diet as tolerated   - Avoid NSAIDs and opiates, consider alternatives in pain control. Patient does not believe her daily opiate use is significant enough to contribute to symtpoms  - Strict Cannibis cessation, discussed with patient. She does not believe it is contributing to symptoms   - Follow up with our [facility] located at [address] [place] [address]. Phone Number: [phone]     43 y/o female with a PMHX of PE (on warfarin), Lupus, RA, gastritis and ectopic pregnancy s/p L salpingectomy  who presents to the hospital for evaluation of a 1 month history of chest pain/back pain. GI consulted for N/V. Pt has NBNB emesis and decreased PO intake for almost a month now. Denies any  She denies any melena, hematochezia, weight loss, diarrhea, or constipation.     # Nausea/ Vomiting foudn to have erosive gastritis / duodenitis   Likely multifactorial in setting of gastritis vs duodenitis vs medication induced (chronic prednisone and percocet) vs gastroparesis vs chronic marijuana use vs constipation  #constipation   - on Prednisone low dose for lupus   - EGD 2015 done for vomiting showed gastritis   - Hgb 14   - CT AP noted as above  - Abdominal exam benign   - On Coumadin for PE   - patient states she has not had a bowel movement in several days. States she is usually constipated, on daily opiates.     EGD 1/9: erosive gastritis, duodenitis. HP negative     RUQUS WNL  Repeat labs WNL    Plan   - Treat constipation; standing miralax BID, dulcolax qd and senna qhs    - Would DC lactulose as can cause bloating and GI upset   - If no improvement in BMs will consider movantik  - Obtain gastric emptying study   - Smaller more frequent meals   - conservative treatment for now with PPI PO BID for 8 weeks and Carafate 1G QID  - antiemetics / IVF as needed   - workup of elevated bHCG  - advance diet as tolerated   - Avoid NSAIDs and opiates, consider alternatives in pain control. Patient does not believe her daily opiate use is significant enough to contribute to symptoms  - Strict Cannibis cessation, discussed with patient. She does not believe it is contributing to symptoms   - Follow up with our [facility] located at [address] [place] [address]. Phone Number: [phone]

## 2025-01-14 NOTE — PROGRESS NOTE ADULT - SUBJECTIVE AND OBJECTIVE BOX
SUBJECTIVE/OVERNIGHT EVENTS  Today is hospital day 7d. This morning patient was seen and examined at bedside, resting comfortably in bed. No acute or major events overnight.    HPI:  45 y/o female with a PMHX of PE (on warfarin), Lupus,  RA, gastritis and ectopic pregnancy s/p L salpingectomy  who presents to the hospital for evaluation of a 1 month history of intractable abdominal pain. Patient reports that the pain started in early December and has recently been associated with vomiting NBNB emesis and decreased PO intake. She denies any fever, chills, headache, shortness of breath, diarrhea or dysuria     Of note patient has been taking low dose prednisone for the past 7 years after her last lupus flare.     ED Course      Vitals: Temp 98.4, HR 79, /86, RR 20     Imaging: CT PE/ Abd/Pelvis: No evidence of PE or acute intraabdominal pathology     Labs: + for BHCG (5.9)             (07 Jan 2025 10:47)      MEDICATIONS  STANDING MEDICATIONS  calcium acetate 667 milliGRAM(s) Oral three times a day with meals  enoxaparin Injectable 90 milliGRAM(s) SubCutaneous every 12 hours  folic acid 1 milliGRAM(s) Oral daily  lactulose Syrup 20 Gram(s) Oral two times a day  metoclopramide 5 milliGRAM(s) Oral three times a day  pantoprazole    Tablet 40 milliGRAM(s) Oral two times a day  polyethylene glycol 3350 17 Gram(s) Oral two times a day  predniSONE   Tablet 2 milliGRAM(s) Oral daily  pregabalin 75 milliGRAM(s) Oral two times a day  senna 2 Tablet(s) Oral at bedtime  sucralfate 1 Gram(s) Oral every 6 hours  warfarin 12.5 milliGRAM(s) Oral once    PRN MEDICATIONS  ALPRAZolam 0.5 milliGRAM(s) Oral every 12 hours PRN  ondansetron Injectable 4 milliGRAM(s) IV Push every 8 hours PRN  oxycodone    5 mG/acetaminophen 325 mG 1 Tablet(s) Oral every 6 hours PRN  oxycodone    5 mG/acetaminophen 325 mG 2 Tablet(s) Oral every 8 hours PRN    VITALS  T(F): 97.8 (01-14-25 @ 05:07), Max: 98 (01-13-25 @ 12:50)  HR: 76 (01-14-25 @ 05:07) (60 - 76)  BP: 114/76 (01-14-25 @ 05:07) (114/76 - 137/84)  RR: 18 (01-13-25 @ 12:50) (18 - 18)  SpO2: 98% (01-13-25 @ 12:50) (98% - 98%)          PHYSICAL EXAM      GENERAL: No acute distress, well-developed  CHEST/LUNG: Clear to auscultation bilaterally; No wheeze, equal breath sounds bilaterally, respirations nonlabored  HEART: Regular rate and rhythm; No murmurs, rubs, or gallops  ABDOMEN: Soft, nontender, nondistended; Bowel sounds present, no organomegaly  NEUROLOGY: AAOx3, non-focal, moves all extremities spontaneously          PAST MEDICAL & SURGICAL HISTORY:  Lupus (systemic lupus erythematosus)      Pulmonary embolism      Fibromyalgia      Gastritis      Rheumatoid arthritis      H/O abdominal surgery            LABS          PT/INR - ( 01-14-25 @ 08:30 )   PT: 17.70 sec[H];   INR: 1.49 ratio[H]            IMAGING

## 2025-01-14 NOTE — PROGRESS NOTE ADULT - SUBJECTIVE AND OBJECTIVE BOX
Gastroenterology progress note:     Patient is a 44y old  Female who presents with a chief complaint of Intractable abdominal pain (14 Jan 2025 11:18)       Admitted on: 01-07-25    We are following the patient for:   emesis    Interval History:    Still no BM. Patient with persistent emesis.           PAST MEDICAL & SURGICAL HISTORY:  Lupus (systemic lupus erythematosus)      Pulmonary embolism      Fibromyalgia      Gastritis      Rheumatoid arthritis      H/O abdominal surgery          MEDICATIONS  (STANDING):  calcium acetate 667 milliGRAM(s) Oral three times a day with meals  enoxaparin Injectable 90 milliGRAM(s) SubCutaneous every 12 hours  folic acid 1 milliGRAM(s) Oral daily  lactulose Syrup 20 Gram(s) Oral two times a day  metoclopramide 5 milliGRAM(s) Oral three times a day  pantoprazole    Tablet 40 milliGRAM(s) Oral two times a day  polyethylene glycol 3350 17 Gram(s) Oral two times a day  predniSONE   Tablet 2 milliGRAM(s) Oral daily  pregabalin 75 milliGRAM(s) Oral two times a day  senna 2 Tablet(s) Oral at bedtime  sucralfate 1 Gram(s) Oral every 6 hours  warfarin 12.5 milliGRAM(s) Oral once    MEDICATIONS  (PRN):  ALPRAZolam 0.5 milliGRAM(s) Oral every 12 hours PRN Anxiety  ondansetron Injectable 4 milliGRAM(s) IV Push every 8 hours PRN Nausea and/or Vomiting  oxycodone    5 mG/acetaminophen 325 mG 1 Tablet(s) Oral every 6 hours PRN Moderate Pain (4 - 6)  oxycodone    5 mG/acetaminophen 325 mG 2 Tablet(s) Oral every 8 hours PRN Severe Pain (7 - 10)      Allergies  No Known Drug Allergies  Tomatoes (Hives)  &quot;cold plasma&quot; (Hives)      Review of Systems:   Cardiovascular:  No Chest Pain, No Palpitations  Respiratory:  No Cough, No Dyspnea  Gastrointestinal:  As described in HPI  Skin:  No Skin Lesions, No Jaundice  Neuro:  No Syncope, No Dizziness    Physical Examination:  T(C): 36.6 (01-14-25 @ 20:01), Max: 36.6 (01-14-25 @ 05:07)  HR: 74 (01-14-25 @ 20:01) (63 - 76)  BP: 106/72 (01-14-25 @ 20:01) (106/72 - 119/86)  RR: 18 (01-14-25 @ 20:01) (17 - 18)  SpO2: 100% (01-14-25 @ 20:01) (99% - 100%)      01-13-25 @ 07:01  -  01-14-25 @ 07:00  --------------------------------------------------------  IN: 100 mL / OUT: 200 mL / NET: -100 mL        GENERAL: AAOx3, no acute distress.  HEAD:  Atraumatic, Normocephalic  EYES: conjunctiva and sclera clear  NECK: Supple, no JVD or thyromegaly  CHEST/LUNG: Clear to auscultation bilaterally; No wheeze, rhonchi, or rales  HEART: Regular rate and rhythm; normal S1, S2, No murmurs.  ABDOMEN: Soft, nontender, nondistended; Bowel sounds present  NEUROLOGY: No asterixis or tremor.   SKIN: Intact, no jaundice     Data:                        14.3   3.51  )-----------( 218      ( 14 Jan 2025 11:08 )             44.0     Hgb trend:  14.3  01-14-25 @ 11:08        01-14    138  |  101  |  8[L]  ----------------------------<  99  4.3   |  27  |  0.9    Ca    9.8      14 Jan 2025 11:08  Mg     2.0     01-14      Liver panel trend:  TBili 0.4   /   AST 17   /   ALT 13   /   AlkP 50   /   Tptn 6.1   /   Alb 4.0    /   DBili --      01-08  TBili 0.4   /   AST 18   /   ALT 13   /   AlkP 54   /   Tptn 6.4   /   Alb 4.0    /   DBili --      01-07  TBili 0.3   /   AST 19   /   ALT 14   /   AlkP 56   /   Tptn 6.8   /   Alb 4.4    /   DBili --      01-07      PT/INR - ( 14 Jan 2025 08:30 )   PT: 17.70 sec;   INR: 1.49 ratio                Radiology:    US Abdomen Upper Quadrant Right:   ACC: [id number] EXAM:  US ABDOMEN RT UPR QUADRANT   ORDERED BY: DONTRELL AGUILERA     PROCEDURE DATE:  01/14/2025          INTERPRETATION:  CLINICAL INFORMATION: Nausea. Vomiting.    COMPARISON: CT scan dated January 7, 2025.    TECHNIQUE: Sonography of the right upper quadrant.    FINDINGS:  Liver: Within normal limits.  Bile ducts: Normal caliber. Common bile duct measures 4 mm.  Gallbladder: Within normal limits.  Pancreas: Visualized portions are within normal limits.  Right kidney: 9.8 cm. No hydronephrosis.  Ascites: None.  IVC: Visualized portions are within normal limits.    IMPRESSION:  Normal right upper quadrant abdominal ultrasound.        --- End of Report ---            SANTIAGO WOODSON MD; Attending Interventional Radiologist  This document has been electronically signed. Jan 14 2025  1:48PM (01-14-25 @ 13:42)

## 2025-01-15 DIAGNOSIS — F12.90 CANNABIS USE, UNSPECIFIED, UNCOMPLICATED: ICD-10-CM

## 2025-01-15 DIAGNOSIS — R52 PAIN, UNSPECIFIED: ICD-10-CM

## 2025-01-15 LAB
ALBUMIN SERPL ELPH-MCNC: 4.1 G/DL — SIGNIFICANT CHANGE UP (ref 3.5–5.2)
ALP SERPL-CCNC: 52 U/L — SIGNIFICANT CHANGE UP (ref 30–115)
ALT FLD-CCNC: 10 U/L — SIGNIFICANT CHANGE UP (ref 0–41)
ANION GAP SERPL CALC-SCNC: 9 MMOL/L — SIGNIFICANT CHANGE UP (ref 7–14)
AST SERPL-CCNC: 17 U/L — SIGNIFICANT CHANGE UP (ref 0–41)
BASOPHILS # BLD AUTO: 0.06 K/UL — SIGNIFICANT CHANGE UP (ref 0–0.2)
BASOPHILS NFR BLD AUTO: 1.1 % — HIGH (ref 0–1)
BILIRUB SERPL-MCNC: 0.5 MG/DL — SIGNIFICANT CHANGE UP (ref 0.2–1.2)
BUN SERPL-MCNC: 8 MG/DL — LOW (ref 10–20)
CALCIUM SERPL-MCNC: 9.1 MG/DL — SIGNIFICANT CHANGE UP (ref 8.4–10.5)
CHLORIDE SERPL-SCNC: 100 MMOL/L — SIGNIFICANT CHANGE UP (ref 98–110)
CO2 SERPL-SCNC: 29 MMOL/L — SIGNIFICANT CHANGE UP (ref 17–32)
CREAT SERPL-MCNC: 1 MG/DL — SIGNIFICANT CHANGE UP (ref 0.7–1.5)
EGFR: 70 ML/MIN/1.73M2 — SIGNIFICANT CHANGE UP
EOSINOPHIL # BLD AUTO: 0.12 K/UL — SIGNIFICANT CHANGE UP (ref 0–0.7)
EOSINOPHIL NFR BLD AUTO: 2.2 % — SIGNIFICANT CHANGE UP (ref 0–8)
GLUCOSE SERPL-MCNC: 77 MG/DL — SIGNIFICANT CHANGE UP (ref 70–99)
HCG SERPL-ACNC: 6 MIU/ML — HIGH
HCT VFR BLD CALC: 45.1 % — SIGNIFICANT CHANGE UP (ref 37–47)
HGB BLD-MCNC: 14.7 G/DL — SIGNIFICANT CHANGE UP (ref 12–16)
IMM GRANULOCYTES NFR BLD AUTO: 0.4 % — HIGH (ref 0.1–0.3)
INR BLD: 2.34 RATIO — HIGH (ref 0.65–1.3)
LYMPHOCYTES # BLD AUTO: 2.99 K/UL — SIGNIFICANT CHANGE UP (ref 1.2–3.4)
LYMPHOCYTES # BLD AUTO: 55.5 % — HIGH (ref 20.5–51.1)
MAGNESIUM SERPL-MCNC: 1.9 MG/DL — SIGNIFICANT CHANGE UP (ref 1.8–2.4)
MCHC RBC-ENTMCNC: 31.4 PG — HIGH (ref 27–31)
MCHC RBC-ENTMCNC: 32.6 G/DL — SIGNIFICANT CHANGE UP (ref 32–37)
MCV RBC AUTO: 96.4 FL — SIGNIFICANT CHANGE UP (ref 81–99)
MONOCYTES # BLD AUTO: 0.34 K/UL — SIGNIFICANT CHANGE UP (ref 0.1–0.6)
MONOCYTES NFR BLD AUTO: 6.3 % — SIGNIFICANT CHANGE UP (ref 1.7–9.3)
NEUTROPHILS # BLD AUTO: 1.86 K/UL — SIGNIFICANT CHANGE UP (ref 1.4–6.5)
NEUTROPHILS NFR BLD AUTO: 34.5 % — LOW (ref 42.2–75.2)
NRBC # BLD: 0 /100 WBCS — SIGNIFICANT CHANGE UP (ref 0–0)
NRBC BLD-RTO: 0 /100 WBCS — SIGNIFICANT CHANGE UP (ref 0–0)
PLATELET # BLD AUTO: 198 K/UL — SIGNIFICANT CHANGE UP (ref 130–400)
PMV BLD: 11.2 FL — HIGH (ref 7.4–10.4)
POTASSIUM SERPL-MCNC: 4 MMOL/L — SIGNIFICANT CHANGE UP (ref 3.5–5)
POTASSIUM SERPL-SCNC: 4 MMOL/L — SIGNIFICANT CHANGE UP (ref 3.5–5)
PROT SERPL-MCNC: 6.4 G/DL — SIGNIFICANT CHANGE UP (ref 6–8)
PROTHROM AB SERPL-ACNC: 28.1 SEC — HIGH (ref 9.95–12.87)
RBC # BLD: 4.68 M/UL — SIGNIFICANT CHANGE UP (ref 4.2–5.4)
RBC # FLD: 11.2 % — LOW (ref 11.5–14.5)
SODIUM SERPL-SCNC: 138 MMOL/L — SIGNIFICANT CHANGE UP (ref 135–146)
WBC # BLD: 5.39 K/UL — SIGNIFICANT CHANGE UP (ref 4.8–10.8)
WBC # FLD AUTO: 5.39 K/UL — SIGNIFICANT CHANGE UP (ref 4.8–10.8)

## 2025-01-15 PROCEDURE — 99222 1ST HOSP IP/OBS MODERATE 55: CPT

## 2025-01-15 PROCEDURE — 99233 SBSQ HOSP IP/OBS HIGH 50: CPT

## 2025-01-15 PROCEDURE — 93010 ELECTROCARDIOGRAM REPORT: CPT

## 2025-01-15 RX ORDER — PROCHLORPERAZINE MALEATE 5 MG/1
10 TABLET ORAL DAILY
Refills: 0 | Status: DISCONTINUED | OUTPATIENT
Start: 2025-01-15 | End: 2025-01-15

## 2025-01-15 RX ORDER — BACTERIOSTATIC SODIUM CHLORIDE 0.9 %
1000 VIAL (ML) INJECTION
Refills: 0 | Status: DISCONTINUED | OUTPATIENT
Start: 2025-01-15 | End: 2025-01-17

## 2025-01-15 RX ORDER — ONDANSETRON 4 MG/1
4 TABLET, ORALLY DISINTEGRATING ORAL EVERY 8 HOURS
Refills: 0 | Status: DISCONTINUED | OUTPATIENT
Start: 2025-01-15 | End: 2025-01-17

## 2025-01-15 RX ORDER — BISACODYL 5 MG
5 TABLET, DELAYED RELEASE (ENTERIC COATED) ORAL AT BEDTIME
Refills: 0 | Status: DISCONTINUED | OUTPATIENT
Start: 2025-01-15 | End: 2025-01-19

## 2025-01-15 RX ORDER — PROCHLORPERAZINE MALEATE 5 MG/1
5 TABLET ORAL EVERY 8 HOURS
Refills: 0 | Status: DISCONTINUED | OUTPATIENT
Start: 2025-01-15 | End: 2025-01-15

## 2025-01-15 RX ORDER — WARFARIN SODIUM 2 MG/1
10 TABLET ORAL ONCE
Refills: 0 | Status: DISCONTINUED | OUTPATIENT
Start: 2025-01-15 | End: 2025-01-15

## 2025-01-15 RX ORDER — WARFARIN SODIUM 2 MG/1
8 TABLET ORAL ONCE
Refills: 0 | Status: COMPLETED | OUTPATIENT
Start: 2025-01-15 | End: 2025-01-15

## 2025-01-15 RX ORDER — PROCHLORPERAZINE MALEATE 5 MG/1
5 TABLET ORAL EVERY 8 HOURS
Refills: 0 | Status: DISCONTINUED | OUTPATIENT
Start: 2025-01-15 | End: 2025-01-17

## 2025-01-15 RX ORDER — ALPRAZOLAM 2 MG
0.5 TABLET ORAL ONCE
Refills: 0 | Status: DISCONTINUED | OUTPATIENT
Start: 2025-01-15 | End: 2025-01-15

## 2025-01-15 RX ADMIN — METOCLOPRAMIDE 5 MILLIGRAM(S): 10 TABLET ORAL at 13:23

## 2025-01-15 RX ADMIN — ONDANSETRON 4 MILLIGRAM(S): 4 TABLET, ORALLY DISINTEGRATING ORAL at 01:39

## 2025-01-15 RX ADMIN — METOCLOPRAMIDE 5 MILLIGRAM(S): 10 TABLET ORAL at 06:06

## 2025-01-15 RX ADMIN — PREDNISONE 2 MILLIGRAM(S): 5 TABLET ORAL at 06:07

## 2025-01-15 RX ADMIN — POLYETHYLENE GLYCOL 3350 17 GRAM(S): 17 POWDER, FOR SOLUTION ORAL at 17:44

## 2025-01-15 RX ADMIN — POLYETHYLENE GLYCOL 3350 17 GRAM(S): 17 POWDER, FOR SOLUTION ORAL at 06:07

## 2025-01-15 RX ADMIN — Medication 70 MILLILITER(S): at 10:18

## 2025-01-15 RX ADMIN — CALCIUM ACETATE 667 MILLIGRAM(S): 667 CAPSULE ORAL at 17:42

## 2025-01-15 RX ADMIN — Medication 2 TABLET(S): at 21:11

## 2025-01-15 RX ADMIN — Medication 70 MILLILITER(S): at 19:51

## 2025-01-15 RX ADMIN — PREGABALIN CAPSULES, CV 75 MILLIGRAM(S): 225 CAPSULE ORAL at 17:43

## 2025-01-15 RX ADMIN — PREGABALIN CAPSULES, CV 75 MILLIGRAM(S): 225 CAPSULE ORAL at 06:09

## 2025-01-15 RX ADMIN — ENOXAPARIN SODIUM 90 MILLIGRAM(S): 100 INJECTION SUBCUTANEOUS at 06:06

## 2025-01-15 RX ADMIN — WARFARIN SODIUM 8 MILLIGRAM(S): 2 TABLET ORAL at 21:42

## 2025-01-15 RX ADMIN — SUCRALFATE 1 GRAM(S): 1 SUSPENSION ORAL at 17:43

## 2025-01-15 RX ADMIN — CALCIUM ACETATE 667 MILLIGRAM(S): 667 CAPSULE ORAL at 08:19

## 2025-01-15 RX ADMIN — Medication 1 MILLIGRAM(S): at 11:23

## 2025-01-15 RX ADMIN — PANTOPRAZOLE 40 MILLIGRAM(S): 20 TABLET, DELAYED RELEASE ORAL at 17:42

## 2025-01-15 RX ADMIN — SUCRALFATE 1 GRAM(S): 1 SUSPENSION ORAL at 06:06

## 2025-01-15 RX ADMIN — ONDANSETRON 4 MILLIGRAM(S): 4 TABLET, ORALLY DISINTEGRATING ORAL at 10:17

## 2025-01-15 RX ADMIN — PROCHLORPERAZINE MALEATE 5 MILLIGRAM(S): 5 TABLET ORAL at 17:48

## 2025-01-15 RX ADMIN — SUCRALFATE 1 GRAM(S): 1 SUSPENSION ORAL at 23:15

## 2025-01-15 RX ADMIN — Medication 70 MILLILITER(S): at 22:45

## 2025-01-15 RX ADMIN — SUCRALFATE 1 GRAM(S): 1 SUSPENSION ORAL at 11:24

## 2025-01-15 RX ADMIN — Medication 0.5 MILLIGRAM(S): at 23:41

## 2025-01-15 RX ADMIN — ONDANSETRON 4 MILLIGRAM(S): 4 TABLET, ORALLY DISINTEGRATING ORAL at 15:01

## 2025-01-15 RX ADMIN — PANTOPRAZOLE 40 MILLIGRAM(S): 20 TABLET, DELAYED RELEASE ORAL at 06:06

## 2025-01-15 RX ADMIN — CALCIUM ACETATE 667 MILLIGRAM(S): 667 CAPSULE ORAL at 11:53

## 2025-01-15 NOTE — PROGRESS NOTE ADULT - ASSESSMENT
45 y/o female with a PMHX of PE (on warfarin), Lupus, RA, gastritis and ectopic pregnancy s/p L salpingectomy  who presents to the hospital for evaluation of a 1 month history of chest pain/back pain. GI consulted for N/V. Pt has NBNB emesis and decreased PO intake for almost a month now. Denies any  She denies any melena, hematochezia, weight loss, diarrhea, or constipation.     # Nausea/ Vomiting foudn to have erosive gastritis / duodenitis   Likely multifactorial in setting of gastritis vs duodenitis vs medication induced (chronic prednisone and percocet) vs gastroparesis vs chronic marijuana use vs constipation  #constipation   - on Prednisone low dose for lupus   - EGD 2015 done for vomiting showed gastritis   - Hgb 14   - CT AP noted as above  - Abdominal exam benign   - On Coumadin for PE   - patient states she has not had a bowel movement in several days. States she is usually constipated, on daily opiates.     EGD 1/9: erosive gastritis, duodenitis. HP negative     RUQUS WNL  Repeat labs WNL  Gastric emptying study: pending  Patient states had liquid bowel movement overnight     Plan   - Treat constipation; standing miralax BID, dulcolax qd and senna qhs    - If no improvement in BMs will consider movantik  - FU gastric emptying study   - Smaller more frequent meals   - conservative treatment for now with PPI PO BID for 8 weeks and Carafate 1G QID  - antiemetics / IVF as needed   - workup of elevated bHCG  - advance diet as tolerated   - Avoid NSAIDs and opiates, consider alternatives in pain control. Patient does not believe her daily opiate use is significant enough to contribute to symptoms  - Strict Cannibis cessation, discussed with patient. She does not believe it is contributing to symptoms   - Follow up with our [facility] located at [address] [place] [address]. Phone Number: [phone]

## 2025-01-15 NOTE — PROGRESS NOTE ADULT - ASSESSMENT
45 y/o female with a PMHX of PE (on warfarin), Lupus,  RA, gastritis and ectopic pregnancy s/p L salpingectomy  who presents to the hospital for evaluation of a 1 month history of intractable abdominal pain. Patient reports that the pain started in early December and has recently been associated with vomiting NBNB emesis and decreased PO intake.     Abdominal pain / Nausea / Vomiting likely due to Erosive gastritis/duodenitis   H/O chronic PE  SLE / RA                 PLAN:    ·	KUB noted. Diffuse bowel gas pattern. Check official report  ·	RUQ US.   ·	Cont Miralax. Add Senna two tablets qhs and Lactulose 20 gm po q 12h  ·	EKG on admission: NSR 74/min (interpreted by me)  ·	Troponin: <6--> <6  ·	CTA chest is negative for PE  ·	CT abd/pelvis is unremarkable  ·	S/P EGD on 1/9. Normal esophagus. Erosive gastritis and duodenitis.   ·	GI recommended Protonix 40 mg po q 12h for 8 weeks, then 40 mg po daily  ·	Cont Sucralfate  ·	Still having N/V. Cont Reglan 5 mg po half an hour before meals.   ·	Duloxetine and Pregabalin can cause N/V. Hold Duloxetine for now  ·	GI f/u  ·	Cont Prednisone dose 2 mg po daily  ·	INR is 2.34. D/C Lovenox. Give Coumadin as per pharmacy recommendation. Check INR in AM    Progress Note Handoff    Pending (specify):  Consults__GI f/u_______, Tests________, Test Results_______, Other_Nausea/Vomiting________  Family discussion:  Disposition: Home___/SNF___/Other________/Unknown at this time________    Ankit Francois MD  Spectra: [phone] 45 y/o female with a PMHX of PE (on warfarin), Lupus,  RA, gastritis and ectopic pregnancy s/p L salpingectomy  who presents to the hospital for evaluation of a 1 month history of intractable abdominal pain. Patient reports that the pain started in early December and has recently been associated with vomiting NBNB emesis and decreased PO intake.     Abdominal pain / Nausea / Vomiting likely due to Erosive gastritis/duodenitis   H/O chronic PE  SLE / RA                 PLAN:    ·	KUB noted. Moderate colonic stool burden. Nonobstructive bowel gas pattern.   ·	Cont bowel regimen.   ·	RUQ US noted. Unremarkable.   ·	Cont Miralax, Senna two tablets qhs and Bisacodyl 5 mg po qhs.   ·	EKG on admission: NSR 74/min (interpreted by me)  ·	Troponin: <6--> <6  ·	CTA chest is negative for PE  ·	CT abd/pelvis is unremarkable  ·	S/P EGD on 1/9. Normal esophagus. Erosive gastritis and duodenitis.   ·	GI recommended Protonix 40 mg po q 12h for 8 weeks, then 40 mg po daily  ·	Cont Sucralfate  ·	Still having N/V. Cont Reglan 5 mg po half an hour before meals.   ·	Duloxetine and Pregabalin can cause N/V. Hold Duloxetine for now  ·	GI f/u  ·	Cont Prednisone dose 2 mg po daily  ·	INR is 2.34. D/C Lovenox. Give Coumadin as per pharmacy recommendation. Check INR in AM    Progress Note Handoff    Pending (specify):  Consults__GI f/u_______, Tests________, Test Results_______, Other_Nausea/Vomiting________  Family discussion:  Disposition: Home___/SNF___/Other________/Unknown at this time________    Ankit Francois MD  Spectra: [phone] 43 y/o female with a PMHX of PE (on warfarin), Lupus,  RA, gastritis and ectopic pregnancy s/p L salpingectomy  who presents to the hospital for evaluation of a 1 month history of intractable abdominal pain. Patient reports that the pain started in early December and has recently been associated with vomiting NBNB emesis and decreased PO intake.     Abdominal pain / Nausea / Vomiting likely due to Erosive gastritis/duodenitis   H/O chronic PE  SLE / RA  H/O Marijuana use                 PLAN:    ·	KUB noted. Moderate colonic stool burden. Nonobstructive bowel gas pattern.   ·	Cont bowel regimen.   ·	RUQ US noted. Unremarkable.   ·	Cont Miralax, Senna two tablets qhs and Bisacodyl 5 mg po qhs.   ·	Pt's N/V could be multifactorial as per GI f/u. Recommended GES.   ·	N/V could be from SLE flare also. Check C3, C4 and anti double strength DNA Abs.   ·	Marijuana can stay in the system for 30 days. Check urine Cannabis level. Pt stopped taking when she came to hospital. R/O Marijuana withdrawal. Addiction Medicine constult  ·	EKG on admission: NSR 74/min (interpreted by me)  ·	Troponin: <6--> <6  ·	CTA chest is negative for PE  ·	CT abd/pelvis is unremarkable  ·	S/P EGD on 1/9. Normal esophagus. Erosive gastritis and duodenitis.   ·	GI recommended Protonix 40 mg po q 12h for 8 weeks, then 40 mg po daily  ·	Cont Sucralfate  ·	Cont Reglan 5 mg po half an hour before meals.   ·	Duloxetine and Pregabalin can cause N/V. Hold Duloxetine for now  ·	Cont Prednisone dose 2 mg po daily  ·	INR is 2.34. D/C Lovenox. Give Coumadin as per pharmacy recommendation. Check INR in AM    Progress Note Handoff    Pending (specify):  Consults__Addiction Medicine_______, Tests__Gastric emptying study______, Test Results_______, Other_Nausea/Vomiting________  Family discussion:  Disposition: Home___/SNF___/Other________/Unknown at this time________    Ankit Francois MD  Spectra: [phone]

## 2025-01-15 NOTE — PROGRESS NOTE ADULT - ASSESSMENT
43 y/o female with a PMHX of PE (on warfarin), Lupus,  RA, gastritis and ectopic pregnancy s/p L salpingectomy  who presents to the hospital for evaluation of a 1 month history of intractable abdominal pain. Patient reports that the pain started in early December and has recently been associated with vomiting NBNB emesis and decreased PO intake. She denies any fever, chills, headache, shortness of breath, diarrhea or dysuria .Of note patient has been taking low dose prednisone for the past 7 years after her last lupus flare.     #Suspected gastritis 2/2 to chronic steroid use   #chronic abdominal pain  #chest pain  #Intractable NV  -patient has been taking low dose prednisone for 7 years (currently on 5mg daily)  -epigastric pain>1 month in duration not associated with meals or viral prodrome   -c/w carafate   -c/w home percocet & lyrica  for anxiety  -S/P EGD on 1/9. Normal esophagus. Erosive gastritis and duodenitis. f/u biopsy, outpatient GI f/u  -Continue Protonix 40 mg po q 12h for 8 weeks, then 40 mg po daily  -cont Sucralfate  -Still having N/V. Add Reglan 5 mg po half an hour before meals  -Diet currently soft and bite sized.   -Pending improvement oral intake for DC  -Repeat serum HCG in 72 hours: 6  -Gi following: Fu RUQ US -WNL. Bowel regimen for constipation. Fu gastric emptying study    #Hx of PE  -daily INR   -warfarin dosing as per pharmacy   -INR at goal today  - warfarin 10mg x1 today > f/u INR     #Hx of Lupus  #Hx of RA  -follows with Dr. Sim Sharpe  -no reported recent flairs   -Decreased Prednisone dose to 2 mg po daily    #MISC  DVT prophylaxis: warfarin   GI prophylaxis: Pantoprazole   Diet: Regular   Activity: IAT

## 2025-01-15 NOTE — PROGRESS NOTE ADULT - SUBJECTIVE AND OBJECTIVE BOX
DAMIAN PRESLEY  44y Female    CHIEF COMPLAINT:    Patient is a 44y old  Female who presents with a chief complaint of Intractable abdominal pain (15 Julius 2025 10:19)      INTERVAL HPI/OVERNIGHT EVENTS:    Patient seen and examined.    ROS: All other systems are negative.    Vital Signs:    T(F): 98.2 (01-15-25 @ 04:51), Max: 98.2 (01-15-25 @ 04:51)  HR: 64 (01-15-25 @ 04:51) (63 - 74)  BP: 103/72 (01-15-25 @ 04:51) (103/72 - 119/86)  RR: 18 (01-15-25 @ 04:51) (17 - 18)  SpO2: 98% (01-15-25 @ 04:51) (98% - 100%)  I&O's Summary    Daily     Daily   CAPILLARY BLOOD GLUCOSE          PHYSICAL EXAM:    GENERAL:  NAD  SKIN: No rashes or lesions  HENT: Atraumatic. Normocephalic. PERRL. Moist membranes.  NECK: Supple, No JVD. No lymphadenopathy.  PULMONARY: CTA B/L. No wheezing. No rales  CVS: Normal S1, S2. Rate and Rhythm are regular. No murmurs.  ABDOMEN/GI: Soft, Nontender, Nondistended; BS present  EXTREMITIES: Peripheral pulses intact. No edema B/L LE.  NEUROLOGIC:  No motor or sensory deficit.  PSYCH: Alert & oriented x 3    Consultant(s) Notes Reviewed:  [x ] YES  [ ] NO  Care Discussed with Consultants/Other Providers [ x] YES  [ ] NO    EKG reviewed  Telemetry reviewed    LABS:                        14.7   5.39  )-----------( 198      ( 15 Julius 2025 05:40 )             45.1     01-15    138  |  100  |  8[L]  ----------------------------<  77  4.0   |  29  |  1.0    Ca    9.1      15 Julius 2025 05:40  Mg     1.9     01-15    TPro  6.4  /  Alb  4.1  /  TBili  0.5  /  DBili  x   /  AST  17  /  ALT  10  /  AlkPhos  52  01-15    PT/INR - ( 15 Julius 2025 05:40 )   PT: 28.10 sec;   INR: 2.34 ratio                   RADIOLOGY & ADDITIONAL TESTS:    < from: US Abdomen Upper Quadrant Right (01.14.25 @ 13:42) >    IMPRESSION:  Normal right upper quadrant abdominal ultrasound.    < end of copied text >  < from: Xray Kidney Ureter Bladder (01.14.25 @ 11:12) >  IMPRESSION:    Moderate colonic stool burden. Nonobstructive bowel gas pattern. No acute   osseous abnormality.    < end of copied text >    Imaging or report Personally Reviewed:  [ ] YES  [ ] NO    Medications:  Standing  bisacodyl 5 milliGRAM(s) Oral at bedtime  calcium acetate 667 milliGRAM(s) Oral three times a day with meals  folic acid 1 milliGRAM(s) Oral daily  metoclopramide 5 milliGRAM(s) Oral three times a day  pantoprazole    Tablet 40 milliGRAM(s) Oral two times a day  polyethylene glycol 3350 17 Gram(s) Oral two times a day  predniSONE   Tablet 2 milliGRAM(s) Oral daily  pregabalin 75 milliGRAM(s) Oral two times a day  senna 2 Tablet(s) Oral at bedtime  sodium chloride 0.9%. 1000 milliLiter(s) IV Continuous <Continuous>  sucralfate 1 Gram(s) Oral every 6 hours  warfarin 10 milliGRAM(s) Oral once    PRN Meds  ondansetron Injectable 4 milliGRAM(s) IV Push every 8 hours PRN  oxycodone    5 mG/acetaminophen 325 mG 2 Tablet(s) Oral every 8 hours PRN      Case discussed with resident    Care discussed with pt/family           DAMIAN PRESLEY  44y Female    CHIEF COMPLAINT:    Patient is a 44y old  Female who presents with a chief complaint of Intractable abdominal pain (15 Julius 2025 10:19)      INTERVAL HPI/OVERNIGHT EVENTS:    Patient seen and examined. Still having N/V and unable to keep down anything    ROS: All other systems are negative.    Vital Signs:    T(F): 98.2 (01-15-25 @ 04:51), Max: 98.2 (01-15-25 @ 04:51)  HR: 64 (01-15-25 @ 04:51) (63 - 74)  BP: 103/72 (01-15-25 @ 04:51) (103/72 - 119/86)  RR: 18 (01-15-25 @ 04:51) (17 - 18)  SpO2: 98% (01-15-25 @ 04:51) (98% - 100%)  I&O's Summary    Daily     Daily   CAPILLARY BLOOD GLUCOSE          PHYSICAL EXAM:    GENERAL:  NAD  SKIN: No rashes or lesions  HENT: Atraumatic. Normocephalic. PERRL. Moist membranes.  NECK: Supple, No JVD. No lymphadenopathy.  PULMONARY: CTA B/L. No wheezing. No rales  CVS: Normal S1, S2. Rate and Rhythm are regular. No murmurs.  ABDOMEN/GI: Soft, Nontender, Nondistended; BS present  EXTREMITIES: Peripheral pulses intact. No edema B/L LE.  NEUROLOGIC:  No motor or sensory deficit.  PSYCH: Alert & oriented x 3    Consultant(s) Notes Reviewed:  [x ] YES  [ ] NO  Care Discussed with Consultants/Other Providers [ x] YES  [ ] NO    EKG reviewed  Telemetry reviewed    LABS:                        14.7   5.39  )-----------( 198      ( 15 Julius 2025 05:40 )             45.1     01-15    138  |  100  |  8[L]  ----------------------------<  77  4.0   |  29  |  1.0    Ca    9.1      15 Julius 2025 05:40  Mg     1.9     01-15    TPro  6.4  /  Alb  4.1  /  TBili  0.5  /  DBili  x   /  AST  17  /  ALT  10  /  AlkPhos  52  01-15    PT/INR - ( 15 Julius 2025 05:40 )   PT: 28.10 sec;   INR: 2.34 ratio                   RADIOLOGY & ADDITIONAL TESTS:    < from: US Abdomen Upper Quadrant Right (01.14.25 @ 13:42) >    IMPRESSION:  Normal right upper quadrant abdominal ultrasound.    < end of copied text >  < from: Xray Kidney Ureter Bladder (01.14.25 @ 11:12) >  IMPRESSION:    Moderate colonic stool burden. Nonobstructive bowel gas pattern. No acute   osseous abnormality.    < end of copied text >    Imaging or report Personally Reviewed:  [ ] YES  [ ] NO    Medications:  Standing  bisacodyl 5 milliGRAM(s) Oral at bedtime  calcium acetate 667 milliGRAM(s) Oral three times a day with meals  folic acid 1 milliGRAM(s) Oral daily  metoclopramide 5 milliGRAM(s) Oral three times a day  pantoprazole    Tablet 40 milliGRAM(s) Oral two times a day  polyethylene glycol 3350 17 Gram(s) Oral two times a day  predniSONE   Tablet 2 milliGRAM(s) Oral daily  pregabalin 75 milliGRAM(s) Oral two times a day  senna 2 Tablet(s) Oral at bedtime  sodium chloride 0.9%. 1000 milliLiter(s) IV Continuous <Continuous>  sucralfate 1 Gram(s) Oral every 6 hours  warfarin 10 milliGRAM(s) Oral once    PRN Meds  ondansetron Injectable 4 milliGRAM(s) IV Push every 8 hours PRN  oxycodone    5 mG/acetaminophen 325 mG 2 Tablet(s) Oral every 8 hours PRN      Case discussed with resident    Care discussed with pt/family

## 2025-01-15 NOTE — PROGRESS NOTE ADULT - SUBJECTIVE AND OBJECTIVE BOX
Gastroenterology progress note:     Patient is a 44y old  Female who presents with a chief complaint of Intractable abdominal pain (15 Julius 2025 10:36)       Admitted on: 01-07-25    We are following the patient for:   emesis     Interval History:    No acute events overnight. Had liquid bowel movement overnight  Persistent emesis      PAST MEDICAL & SURGICAL HISTORY:  Lupus (systemic lupus erythematosus)      Pulmonary embolism      Fibromyalgia      Gastritis      Rheumatoid arthritis      H/O abdominal surgery          MEDICATIONS  (STANDING):  bisacodyl 5 milliGRAM(s) Oral at bedtime  calcium acetate 667 milliGRAM(s) Oral three times a day with meals  folic acid 1 milliGRAM(s) Oral daily  pantoprazole    Tablet 40 milliGRAM(s) Oral two times a day  polyethylene glycol 3350 17 Gram(s) Oral two times a day  predniSONE   Tablet 2 milliGRAM(s) Oral daily  pregabalin 75 milliGRAM(s) Oral two times a day  prochlorperazine   Injectable 5 milliGRAM(s) IV Push every 8 hours  senna 2 Tablet(s) Oral at bedtime  sodium chloride 0.9%. 1000 milliLiter(s) (70 mL/Hr) IV Continuous <Continuous>  sucralfate 1 Gram(s) Oral every 6 hours  warfarin 8 milliGRAM(s) Oral once    MEDICATIONS  (PRN):  ondansetron Injectable 4 milliGRAM(s) IV Push every 8 hours PRN Nausea and/or Vomiting      Allergies  No Known Drug Allergies  Tomatoes (Hives)  &quot;cold plasma&quot; (Hives)      Review of Systems:   Cardiovascular:  No Chest Pain, No Palpitations  Respiratory:  No Cough, No Dyspnea  Gastrointestinal:  As described in HPI  Skin:  No Skin Lesions, No Jaundice  Neuro:  No Syncope, No Dizziness    Physical Examination:  T(C): 36.6 (01-15-25 @ 12:00), Max: 36.8 (01-15-25 @ 04:51)  HR: 77 (01-15-25 @ 12:00) (64 - 77)  BP: 110/65 (01-15-25 @ 12:00) (103/72 - 110/65)  RR: 18 (01-15-25 @ 12:00) (18 - 18)  SpO2: 98% (01-15-25 @ 04:51) (98% - 100%)        GENERAL: AAOx3, no acute distress.  HEAD:  Atraumatic, Normocephalic  EYES: conjunctiva and sclera clear  NECK: Supple, no JVD or thyromegaly  CHEST/LUNG: Clear to auscultation bilaterally; No wheeze, rhonchi, or rales  HEART: Regular rate and rhythm; normal S1, S2, No murmurs.  ABDOMEN: Soft, nontender, nondistended; Bowel sounds present  NEUROLOGY: No asterixis or tremor.   SKIN: Intact, no jaundice     Data:                        14.7   5.39  )-----------( 198      ( 15 Julius 2025 05:40 )             45.1     Hgb trend:  14.7  01-15-25 @ 05:40  14.3  01-14-25 @ 11:08        01-15    138  |  100  |  8[L]  ----------------------------<  77  4.0   |  29  |  1.0    Ca    9.1      15 Julius 2025 05:40  Mg     1.9     01-15    TPro  6.4  /  Alb  4.1  /  TBili  0.5  /  DBili  x   /  AST  17  /  ALT  10  /  AlkPhos  52  01-15    Liver panel trend:  TBili 0.5   /   AST 17   /   ALT 10   /   AlkP 52   /   Tptn 6.4   /   Alb 4.1    /   DBili --      01-15  TBili 0.4   /   AST 17   /   ALT 13   /   AlkP 50   /   Tptn 6.1   /   Alb 4.0    /   DBili --      01-08  TBili 0.4   /   AST 18   /   ALT 13   /   AlkP 54   /   Tptn 6.4   /   Alb 4.0    /   DBili --      01-07  TBili 0.3   /   AST 19   /   ALT 14   /   AlkP 56   /   Tptn 6.8   /   Alb 4.4    /   DBili --      01-07      PT/INR - ( 15 Julius 2025 05:40 )   PT: 28.10 sec;   INR: 2.34 ratio                Radiology:    US Abdomen Upper Quadrant Right:   ACC: [id number] EXAM:  US ABDOMEN RT UPR QUADRANT   ORDERED BY: DONTRELL AGUILERA     PROCEDURE DATE:  01/14/2025          INTERPRETATION:  CLINICAL INFORMATION: Nausea. Vomiting.    COMPARISON: CT scan dated January 7, 2025.    TECHNIQUE: Sonography of the right upper quadrant.    FINDINGS:  Liver: Within normal limits.  Bile ducts: Normal caliber. Common bile duct measures 4 mm.  Gallbladder: Within normal limits.  Pancreas: Visualized portions are within normal limits.  Right kidney: 9.8 cm. No hydronephrosis.  Ascites: None.  IVC: Visualized portions are within normal limits.    IMPRESSION:  Normal right upper quadrant abdominal ultrasound.        --- End of Report ---            SANTIAGO WOODSON MD; Attending Interventional Radiologist  This document has been electronically signed. Jan 14 2025  1:48PM (01-14-25 @ 13:42)

## 2025-01-15 NOTE — PROGRESS NOTE ADULT - SUBJECTIVE AND OBJECTIVE BOX
SUBJECTIVE/OVERNIGHT EVENTS  Today is hospital day 8d. This morning patient was seen and examined at bedside, resting comfortably in bed. No acute or major events overnight.    HPI:  43 y/o female with a PMHX of PE (on warfarin), Lupus,  RA, gastritis and ectopic pregnancy s/p L salpingectomy  who presents to the hospital for evaluation of a 1 month history of intractable abdominal pain. Patient reports that the pain started in early December and has recently been associated with vomiting NBNB emesis and decreased PO intake. She denies any fever, chills, headache, shortness of breath, diarrhea or dysuria     Of note patient has been taking low dose prednisone for the past 7 years after her last lupus flare.     ED Course      Vitals: Temp 98.4, HR 79, /86, RR 20     Imaging: CT PE/ Abd/Pelvis: No evidence of PE or acute intraabdominal pathology     Labs: + for BHCG (5.9)             (07 Jan 2025 10:47)      MEDICATIONS  STANDING MEDICATIONS  bisacodyl 5 milliGRAM(s) Oral at bedtime  calcium acetate 667 milliGRAM(s) Oral three times a day with meals  folic acid 1 milliGRAM(s) Oral daily  metoclopramide 5 milliGRAM(s) Oral three times a day  pantoprazole    Tablet 40 milliGRAM(s) Oral two times a day  polyethylene glycol 3350 17 Gram(s) Oral two times a day  predniSONE   Tablet 2 milliGRAM(s) Oral daily  pregabalin 75 milliGRAM(s) Oral two times a day  senna 2 Tablet(s) Oral at bedtime  sodium chloride 0.9%. 1000 milliLiter(s) IV Continuous <Continuous>  sucralfate 1 Gram(s) Oral every 6 hours  warfarin 10 milliGRAM(s) Oral once    PRN MEDICATIONS  ondansetron Injectable 4 milliGRAM(s) IV Push every 8 hours PRN  oxycodone    5 mG/acetaminophen 325 mG 2 Tablet(s) Oral every 8 hours PRN    VITALS  T(F): 98.2 (01-15-25 @ 04:51), Max: 98.2 (01-15-25 @ 04:51)  HR: 64 (01-15-25 @ 04:51) (63 - 74)  BP: 103/72 (01-15-25 @ 04:51) (103/72 - 119/86)  RR: 18 (01-15-25 @ 04:51) (17 - 18)  SpO2: 98% (01-15-25 @ 04:51) (98% - 100%)          PHYSICAL EXAM    GENERAL: No acute distress, well-developed  CHEST/LUNG: Clear to auscultation bilaterally; No wheeze, equal breath sounds bilaterally, respirations nonlabored  HEART: Regular rate and rhythm; No murmurs, rubs, or gallops  ABDOMEN: Soft, nontender, nondistended; Bowel sounds present, no organomegaly  NEUROLOGY: AAOx3, non-focal, moves all extremities spontaneously        PAST MEDICAL & SURGICAL HISTORY:  Lupus (systemic lupus erythematosus)      Pulmonary embolism      Fibromyalgia      Gastritis      Rheumatoid arthritis      H/O abdominal surgery            LABS             14.7   5.39  )-----------( 198      ( 01-15-25 @ 05:40 )             45.1     138  |  100  |  8   -------------------------<  77   01-15-25 @ 05:40  4.0  |  29  |  1.0    Ca      9.1     01-15-25 @ 05:40  Mg     1.9     01-15-25 @ 05:40    TPro  6.4  /  Alb  4.1  /  TBili  0.5  /  DBili  x   /  AST  17  /  ALT  10  /  AlkPhos  52  /  GGT  x     01-15-25 @ 05:40    PT/INR - ( 01-15-25 @ 05:40 )   PT: 28.10 sec[H];   INR: 2.34 ratio[H]      Urinalysis Basic - ( 15 Julius 2025 05:40 )    Color: x / Appearance: x / SG: x / pH: x  Gluc: 77 mg/dL / Ketone: x  / Bili: x / Urobili: x   Blood: x / Protein: x / Nitrite: x   Leuk Esterase: x / RBC: x / WBC x   Sq Epi: x / Non Sq Epi: x / Bacteria: x          IMAGING

## 2025-01-15 NOTE — CONSULT NOTE ADULT - SUBJECTIVE AND OBJECTIVE BOX
Gastroenterology Consultation:    Patient is a 44y old  Female who presents with a chief complaint of Intractable abdominal pain (08 Jan 2025 09:57)        Admitted on: 01-07-25      HPI:  43 y/o female with a PMHX of PE (on warfarin), Lupus, RA, gastritis and ectopic pregnancy s/p L salpingectomy  who presents to the hospital for evaluation of a 1 month history of chest pain/back pain. GI consulted for N/V. Pt has NBNB emesis and decreased PO intake for almost a month now. Denies any  She denies any melena, hematochezia, weight loss, diarrhea, or constipation.         Prior EGD: 2015 see below     Prior Colonoscopy: never       PAST MEDICAL & SURGICAL HISTORY:  Lupus (systemic lupus erythematosus)  Pulmonary embolism  Fibromyalgia  Gastritis  Rheumatoid arthritis  s/p L salpingectomy            FAMILY HISTORY:  Family history of CHF (congestive heart failure) (Mother)        Social History:  Tobacco: 2 packs a week  Alcohol: denies   Drugs: recreational marijuana use     Home Medications:  Lyrica 50 mg oral capsule: 1 cap(s) orally 3 times a day (07 Jan 2025 11:03)        MEDICATIONS  (STANDING):  calcium acetate 667 milliGRAM(s) Oral three times a day with meals  pantoprazole    Tablet 40 milliGRAM(s) Oral every 12 hours  pregabalin 50 milliGRAM(s) Oral three times a day  sodium chloride 0.9%. 1000 milliLiter(s) (100 mL/Hr) IV Continuous <Continuous>  sucralfate 1 Gram(s) Oral every 6 hours    MEDICATIONS  (PRN):  ALPRAZolam 0.5 milliGRAM(s) Oral every 12 hours PRN Anxiety  ondansetron Injectable 4 milliGRAM(s) IV Push every 8 hours PRN Nausea and/or Vomiting  oxycodone    5 mG/acetaminophen 325 mG 1 Tablet(s) Oral every 6 hours PRN Moderate Pain (4 - 6)  oxycodone    5 mG/acetaminophen 325 mG 2 Tablet(s) Oral every 8 hours PRN Severe Pain (7 - 10)      Allergies  No Known Drug Allergies  Tomatoes (Hives)  &quot;cold plasma&quot; (Hives)      Review of Systems:   Constitutional:  No Fever, No Chills  ENT/Mouth:  No Hearing Changes,  No Difficulty Swallowing  Eyes:  No Eye Pain, No Vision Changes  Cardiovascular:  No Chest Pain, No Palpitations  Respiratory:  No Cough, No Dyspnea  Gastrointestinal:  As described in HPI  Musculoskeletal:  No Joint Swelling, No Back Pain  Skin:  No Skin Lesions, No Jaundice  Neuro:  No Syncope, No Dizziness  Heme/Lymph:  No Bruising, No Bleeding.          Physical Examination:  T(C): 36.4 (01-08-25 @ 09:25), Max: 36.4 (01-07-25 @ 15:44)  HR: 62 (01-08-25 @ 09:25) (62 - 80)  BP: 116/81 (01-08-25 @ 09:25) (107/72 - 124/83)  RR: 18 (01-08-25 @ 09:25) (18 - 18)  SpO2: 100% (01-08-25 @ 09:25) (98% - 100%)          GENERAL: AAOx3, no acute distress.  HEAD:  Atraumatic, Normocephalic  EYES: conjunctiva and sclera clear  NECK: Supple, no JVD or thyromegaly  CHEST/LUNG: Clear to auscultation bilaterally; No wheeze, rhonchi, or rales  HEART: Regular rate and rhythm; normal S1, S2, No murmurs.  ABDOMEN: Soft, nontender, nondistended; Bowel sounds present  NEUROLOGY: No asterixis or tremor.   SKIN: Intact, no jaundice        Data:                        13.4   3.70  )-----------( 219      ( 08 Jan 2025 08:01 )             41.6     Hgb Trend:  13.4  01-08-25 @ 08:01  14.1  01-07-25 @ 11:22  14.6  01-07-25 @ 02:06      01-08    143  |  108  |  10  ----------------------------<  89  3.8   |  26  |  0.7    Ca    8.5      08 Jan 2025 08:01  Mg     1.8     01-08    TPro  6.1  /  Alb  4.0  /  TBili  0.4  /  DBili  x   /  AST  17  /  ALT  13  /  AlkPhos  50  01-08    Liver panel trend:  TBili 0.4   /   AST 17   /   ALT 13   /   AlkP 50   /   Tptn 6.1   /   Alb 4.0    /   DBili --      01-08  TBili 0.4   /   AST 18   /   ALT 13   /   AlkP 54   /   Tptn 6.4   /   Alb 4.0    /   DBili --      01-07  TBili 0.3   /   AST 19   /   ALT 14   /   AlkP 56   /   Tptn 6.8   /   Alb 4.4    /   DBili --      01-07      PT/INR - ( 08 Jan 2025 08:01 )   PT: >40.00 sec;   INR: 3.78 ratio         PTT - ( 07 Jan 2025 02:06 )  PTT:57.4 sec    Urinalysis with Rflx Culture (collected 07 Jan 2025 07:06)    Culture - Urine (collected 07 Jan 2025 07:06)  Source: Clean Catch None  Final Report (08 Jan 2025 12:23):    <10,000 CFU/mL Normal Urogenital Cyn          Radiology:      ACC: [id number] EXAM:  CT ABDOMEN AND PELVIS IC   ORDERED BY: ANETTE NUÑEZ     ACC: [id number] EXAM:  CT ANGIO CHEST PULM ART WAWIC   ORDERED BY: ANETTE NUÑEZ     PROCEDURE DATE:  01/07/2025          INTERPRETATION:  CLINICAL INFORMATION: 4 weeks of mid chest pain   radiating to the back. Clinical concern for pulmonary embolism.. History   of pulmonary embolism on Coumadin.    COMPARISON: CT abdomen pelvis 1/29/2019. CT chest 11/27/2018.    CONTRAST/COMPLICATIONS:  IV Contrast: Omnipaque 350 ([id number]), IV contrast documented   in unlinked concurrent exam (accession [id number])  100 cc administered   0   cc discarded  Oral Contrast: NONE    Patient with positive serum beta hCG. Imaging was done based on ordering   provider and patient discretion.  .    PROCEDURE:  CT Angiography of the Chest was performed followed by portal venous phase   imaging of the Abdomen and Pelvis.  Sagittal and coronal reformats were performed as well as 3D (MIP)   reconstructions.    FINDINGS:  CHEST:  LUNGS/PLEURA/AIRWAYS: Trachea is clear. Redemonstrated biapical blebs or   bronchiolectasis. No acute consolidation. No pleural effusion. No   pneumothorax.  VESSELS: Trace atherosclerosis. No evidence of acute pulmonary embolism.  HEART: Heart size is normal. No pericardial effusion.  MEDIASTINUM AND CHANTEL: No lymphadenopathy.  CHEST WALL AND LOWER NECK: Within normal limits.    ABDOMEN AND PELVIS:  LIVER: Subcentimeter hypodensities too small to characterize within   normal limits.  BILE DUCTS: Normal caliber.  GALLBLADDER: Within normal limits.  SPLEEN: Within normal limits.  PANCREAS: Within normal limits.  ADRENALS: Within normal limits.  KIDNEYS/URETERS: Within normal limits.    BLADDER: Within normal limits.  REPRODUCTIVE ORGANS: Fibroid uterus.    BOWEL: No bowel obstruction. Appendix is normal.  PERITONEUM/RETROPERITONEUM: Within normal limits.  VESSELS: Trace atherosclerotic calcification.  LYMPH NODES: No lymphadenopathy.  ABDOMINAL WALL: Small fat-containing umbilical hernia.  BONES: Within normal limits.    IMPRESSION:  1.  No evidence of acute pulmonary embolism.  2.  No evidence of acute thoracic or abdominal pelvic pathology.        --- End of Report ---            CHELY CASH MD; Attending Radiologist  This document has been electronically signed. Jan 7 2025  6:06AM    
  Pt interviewed, examined and EMR chart reviewed.    44 y domciled Female with a PMHX of PE (on warfarin), Lupus, RA, gastritis, ectopic pregnancy s/p L salpingectomy and PPHx of  who presents to the hospital for evaluation of a 1 month history of intractable abdominal pain, nausea and vomiting admitted to medicine for workup. Addiction Medicine consulted for concern for cannabis hyperemesis syndrome.     Patient was seen this AM, sitting upright in bed, attempting to eat breakfast appearing uncomfortable.  Roel present for interview, patient consented to have  be present. Writer explained role of addiction team in the hospital. Patient states that she smokes cannabis joints has been smoking them daily for a few years, states that it helps with her lupus pain. Patient denies any other illicit substances or tobacco use. Patient denies alcohol use.     Patient endorsed feeling stigmatized in the hospital regarding her condition and doesn't want the team to pigeon hole her condition into the bucket of consequences of substance use. Patient states that 8 years ago she had a similar 45 days of intractable vomiting and she was not using any cannabis at that time so she is confused as to why it is a concern this time. Patient also admits that 2 months ago, she started using a baby aspirin to help with stroke prevention that she heard from somewhere and has been taking the aspirin daily until prior to her symptoms getting worse recently. Patient admits that she has been on chronic steroids for her lupus, and understands that some of that might be contributing to her current presentation.      Patient denies increased anxiety, depression, SI/HI, AH/VH. Patient admits to nausea and intermittent vomiting on her food/water.     [facility] ISTOP:   This report was requested by: Marlin Rosales | Reference #: [id number]    Practitioner Count: 1  Pharmacy Count: 1  Current Opioid Prescriptions: 0  Current Benzodiazepine Prescriptions: 0  Current Stimulant Prescriptions: 0      Patient Demographic Information (PDI)       PDI	First Name	Last Name	Birth Date	Gender	Street Address	[facility] Code  A	[facility]	1980	Female	[address]	[id number]  B	[facility]	1980	Female	[address]	[place]	[id number]    Prescription Information      PDI Filter:    PDI	My Rx	Current Rx	Drug Type	Rx Written	Rx Dispensed	Drug	Quantity	Days Supply  A	N	N	O	12/05/2024	12/05/2024	oxycodone-acetaminophen 5-325 mg tab	180	22  Prescriber Name Sim Sharpe MD  Prescriber ROSI # [id number]  Payment Method Insurance  Dispenser [facility]  A	N	N		12/05/2024	12/05/2024	pregabalin 75 mg capsule	60	30  Prescriber Name Sim Shapre MD  Prescriber ROSI # [id number]  Payment Method Insurance  Dispenser [facility]  A	N	N	O	10/31/2024	10/31/2024	oxycodone-acetaminophen  mg tab	90	30  Prescriber Name Sim Sharpe MD  Prescriber ROSI # [id number]  Payment Method Insurance  Dispenser [facility]  A	N	N	O	10/07/2024	10/07/2024	oxycodone-acetaminophen 5-325 mg tablet	180	30  Prescriber Name Sim Sharpe MD  Prescriber ROSI # [id number]  Payment Method Insurance  Dispenser [facility]  B	N	N		05/06/2024	09/24/2024	pregabalin 75 mg capsule	60	30  Prescriber Name Sim Sharpe MD  Prescriber ROSI # [id number]  Payment Method Insurance  Dispens[facility]  B	N	N	O	09/05/2024	09/06/2024	oxycodone-acetaminophen 5-325 mg tablet	180	30  Prescriber Name Sim Sharpe MD  Prescriber ROSI # [id number]  Payment Method Insurance  Dispens[facility]  B	N	N		05/06/2024	08/26/2024	pregabalin 75 mg capsule	60	30  Prescriber Name Sim Sharpe MD  Prescriber ROSI # [id number]  Payment Method Insurance  Dispenser [facility]  B	N	N	O	08/06/2024	08/06/2024	oxycodone-acetaminophen 5-325 mg tab	180	30  Prescriber Name Sim Sharpe MD  Prescriber ROSI # [id number]  Payment Method Insurance  Dispenser [facility]  B	N	N		05/06/2024	07/29/2024	pregabalin 75 mg capsule	60	30  Prescriber Name Sim Sharpe MD  Prescriber ROSI # [id number]  Payment Method Insurance  Dispenser [facility]  B	N	N	O	07/08/2024	07/08/2024	oxycodone-acetaminophen 5-325 mg tablet	180	30  Prescriber Name Sim Sharpe MD  Prescriber ROSI # [id number]  Payment Method Insurance  Dispenser [facility]  B	N	N		05/06/2024	07/01/2024	pregabalin 75 mg capsule	60	30  Prescriber Name Sim Sharpe MD  Prescriber ROSI # [id number]  Payment Method Insurance  Dispenser [facility]  B	N	N	O	06/05/2024	06/05/2024	oxycodone-acetaminophen 5-325 mg tablet	180	30  Prescriber Name Sim Sharpe MD  Prescriber ROSI # [id number]  Payment Method Insurance  Dispenser [facility]  B	N	N		05/06/2024	06/03/2024	pregabalin 75 mg capsule	60	30  Prescriber Name Sim Sharpe MD  Prescriber ROSI # [id number]  Payment Method Insurance  Dispenser [facility]  B	N	N	O	05/06/2024	05/07/2024	oxycodone-acetaminophen 5-325 mg tab	180	30  Prescriber Name Sim Sharpe MD  Prescriber ROSI # [id number]  Payment Method Insurance  Dispenser [facility]  B	N	N		05/06/2024	05/06/2024	pregabalin 75 mg capsule	60	30  Prescriber Name Sim Sharpe MD  Prescriber ROSI # [id number]  Payment Method Insurance  Dispenser [facility]  B	N	N	O	04/08/2024	04/09/2024	oxycodone-acetaminophen 5-325 mg tab	180	30  Prescriber Name Sim Sharpe MD  Prescriber ROSI # [id number]  Payment Method Insurance  Dispenser [facility]  B	N	N		02/06/2024	04/06/2024	pregabalin 75 mg capsule	60	30  Prescriber Name Sim Sharpe MD  Prescriber ROSI # [id number]  Payment Method Insurance  Dispenser [facility]  B	N	N	O	03/06/2024	03/06/2024	oxycodone-acetaminophen 5-325 mg tab	180	30  Prescriber Name Sim Sharpe MD  Prescriber ROSI # [id number]  Payment Method Insurance  Dispenser [facility]  B	N	N		02/06/2024	03/05/2024	pregabalin 75 mg capsule	60	30  Prescriber Name Sim Sharpe MD  Prescriber ROSI # [id number]  Payment Method Insurance  Dispenser [facility]  B	N	N	O	02/06/2024	02/06/2024	oxycodone-acetaminophen 5-325 mg tab	180	30  Prescriber Name Sim Sharpe MD  Prescriber ROSI # [id number]  Payment Method Insurance  Dispenser [facility]  B	N	N		12/06/2023	01/26/2024	pregabalin 75 mg capsule	60	30  Prescriber Name Sim Sharpe MD  Prescriber ROSI # [id number]  Payment Method Insurance  Dispenser [facility]       SOCIAL HISTORY:    REVIEW OF SYSTEMS:per HPI     MEDICATIONS  (STANDING):  bisacodyl 5 milliGRAM(s) Oral at bedtime  calcium acetate 667 milliGRAM(s) Oral three times a day with meals  folic acid 1 milliGRAM(s) Oral daily  pantoprazole    Tablet 40 milliGRAM(s) Oral two times a day  polyethylene glycol 3350 17 Gram(s) Oral two times a day  predniSONE   Tablet 2 milliGRAM(s) Oral daily  pregabalin 75 milliGRAM(s) Oral two times a day  prochlorperazine   IVPB 5 milliGRAM(s) IV Intermittent every 8 hours  senna 2 Tablet(s) Oral at bedtime  sodium chloride 0.9%. 1000 milliLiter(s) (70 mL/Hr) IV Continuous <Continuous>  sucralfate 1 Gram(s) Oral every 6 hours  warfarin 8 milliGRAM(s) Oral once    MEDICATIONS  (PRN):  ondansetron Injectable 4 milliGRAM(s) IV Push every 8 hours PRN Nausea and/or Vomiting  oxycodone    5 mG/acetaminophen 325 mG 2 Tablet(s) Oral every 8 hours PRN Severe Pain (7 - 10)      Vital Signs Last 24 Hrs  T(C): 36.6 (15 Julius 2025 12:00), Max: 36.8 (15 Julius 2025 04:51)  T(F): 97.8 (15 Julius 2025 12:00), Max: 98.2 (15 Julius 2025 04:51)  HR: 77 (15 Julius 2025 12:00) (64 - 77)  BP: 110/65 (15 Julius 2025 12:00) (103/72 - 110/65)  BP(mean): --  RR: 18 (15 Julius 2025 12:00) (18 - 18)  SpO2: 98% (15 Julius 2025 04:51) (98% - 100%)    Parameters below as of 14 Jan 2025 20:01  Patient On (Oxygen Delivery Method): room air      PHYSICAL EXAM:    Constitutional: NAD, well-groomed, well-developed  HEENT: PERRLA, EOMI  Respiratory: no increased work of breathing  Neurological: A/O x 3, no focal deficits    MENTAL STATUS EXAM:  Appearance: calm, in hospital attire   Appearance in relation to age: looks stated age      Hygiene/Grooming: fair grooming   Attitude toward examiner: fully cooperative  Alertness: alert  Orientation: oriented to time, place and person  Posture: lying in bed  Gait: not evaluated  Behavior and psychomotor activity: normal  Mood: distressed, "not well"   Affect: full range and reactivity      Speech: fluent and spontaneous; normal rate, rhythm, volume and tone   Perceptions: denies hallucinations  Thought process: logical, linear and goal-directed    Thought content: no delusions or particular preoccupation, denies SI/HI  Associations: no loosening of associations   Impulse Control: aware of socially acceptable behavior  Insight: improving, aware of multifactorial causes of her symptoms  Judgment: understands consequences of cannabis use    LABS:                        14.7   5.39  )-----------( 198      ( 15 Julius 2025 05:40 )             45.1     01-15    138  |  100  |  8[L]  ----------------------------<  77  4.0   |  29  |  1.0    Ca    9.1      15 Julius 2025 05:40  Mg     1.9     01-15    TPro  6.4  /  Alb  4.1  /  TBili  0.5  /  DBili  x   /  AST  17  /  ALT  10  /  AlkPhos  52  01-15    PT/INR - ( 15 Julius 2025 05:40 )   PT: 28.10 sec;   INR: 2.34 ratio           Urinalysis Basic - ( 15 Julius 2025 05:40 )    Color: x / Appearance: x / SG: x / pH: x  Gluc: 77 mg/dL / Ketone: x  / Bili: x / Urobili: x   Blood: x / Protein: x / Nitrite: x   Leuk Esterase: x / RBC: x / WBC x   Sq Epi: x / Non Sq Epi: x / Bacteria: x      Drug Screen Urine: none  Alcohol Level none         RADIOLOGY & ADDITIONAL STUDIES:  < from: US Abdomen Upper Quadrant Right (01.14.25 @ 13:42) >  IMPRESSION:  Normal right upper quadrant abdominal ultrasound.    < end of copied text >  < from: Xray Kidney Ureter Bladder (01.14.25 @ 11:12) >  FINDINGS/  IMPRESSION:    Moderate colonic stool burden. Nonobstructive bowel gas pattern. No acute   osseous abnormality.    < end of copied text >  < from: US Transvaginal (01.12.25 @ 09:35) >  IMPRESSION:  Uterine fibroids measuring up to 3 cm    No intrauterine gestation. No adnexal masses.    < end of copied text >  < from: CT Abdomen and Pelvis w/ IV Cont (01.07.25 @ 05:34) >    IMPRESSION:  1.  No evidence of acute pulmonary embolism.  2.  No evidence of acute thoracic or abdominal pelvic pathology.    < end of copied text >

## 2025-01-15 NOTE — PHARMACOTHERAPY INTERVENTION NOTE - COMMENTS
44yFemale    Indication: Hx of PE, Hx of Lupus  INR Goal: 2-3  Home Dose: Warfarin 10mg QD  Bridge Therapy:    Current Medications:  bisacodyl 5 milliGRAM(s) Oral at bedtime  calcium acetate 667 milliGRAM(s) Oral three times a day with meals  folic acid 1 milliGRAM(s) Oral daily  metoclopramide 5 milliGRAM(s) Oral three times a day  oxycodone    5 mG/acetaminophen 325 mG 2 Tablet(s) Oral every 8 hours PRN  pantoprazole    Tablet 40 milliGRAM(s) Oral two times a day  polyethylene glycol 3350 17 Gram(s) Oral two times a day  predniSONE   Tablet 2 milliGRAM(s) Oral daily  pregabalin 75 milliGRAM(s) Oral two times a day  senna 2 Tablet(s) Oral at bedtime  sodium chloride 0.9%. 1000 milliLiter(s) IV Continuous <Continuous>  sucralfate 1 Gram(s) Oral every 6 hours  warfarin 10 milliGRAM(s) Oral once      hemoglobin 14.7 g/dL (01-15-25 @ 05:40)    hematocrit 45.1 % (01-15-25 @ 05:40)    PLT: 198 K/uL (01-15-25 @ 05:40)    GFR:70 mL/min/1.73m2 (01-15-25 @ 05:40)      Drug Interactions:     INR trend  2.63 ratio (01-09-25 @ 07:20)  1.94 ratio (01-10-25 @ 13:33)  2.24 ratio (01-12-25 @ 16:13)  1.97 ratio (01-13-25 @ 05:37)  1.49 ratio (01-14-25 @ 08:30)  2.34 ratio (01-15-25 @ 05:40)      Warfarin administration history:  warfarin: 7.5 milliGRAM(s) (01-09-25 @ 21:11)  warfarin: 10 milliGRAM(s) (01-10-25 @ 22:11)  warfarin: 10 milliGRAM(s) (01-13-25 @ 21:01)  warfarin: 12.5 milliGRAM(s) (01-14-25 @ 22:12)        1. INR today is:  2.34             [   ] below goal ----- This is likely due to                                            [  x ] at goal                                            [   ] above goal ------ This is likely due to        2. Recommend Warfarin  8mg  PO x 1. Patient received a load of Warfarin 12.5mg po x 1 yesterday, INR jumped by 0.85 from 1.49 on 01/14 to 2.34 on 01/15. Recommend Warfarin 8mg po x 1 for tonight to avoid supratherapeutic INR   3. Obtain INR tomorrow AM     44yFemale    Indication: Hx of PE, Hx of Lupus  INR Goal: 2-3  Home Dose: Warfarin 10mg QD  Bridge Therapy:    Current Medications:  bisacodyl 5 milliGRAM(s) Oral at bedtime  calcium acetate 667 milliGRAM(s) Oral three times a day with meals  folic acid 1 milliGRAM(s) Oral daily  metoclopramide 5 milliGRAM(s) Oral three times a day  oxycodone    5 mG/acetaminophen 325 mG 2 Tablet(s) Oral every 8 hours PRN  pantoprazole    Tablet 40 milliGRAM(s) Oral two times a day  polyethylene glycol 3350 17 Gram(s) Oral two times a day  predniSONE   Tablet 2 milliGRAM(s) Oral daily  pregabalin 75 milliGRAM(s) Oral two times a day  senna 2 Tablet(s) Oral at bedtime  sodium chloride 0.9%. 1000 milliLiter(s) IV Continuous <Continuous>  sucralfate 1 Gram(s) Oral every 6 hours  warfarin 10 milliGRAM(s) Oral once      hemoglobin 14.7 g/dL (01-15-25 @ 05:40)    hematocrit 45.1 % (01-15-25 @ 05:40)    PLT: 198 K/uL (01-15-25 @ 05:40)    GFR:70 mL/min/1.73m2 (01-15-25 @ 05:40)      Drug Interactions:     INR trend  2.63 ratio (01-09-25 @ 07:20)  1.94 ratio (01-10-25 @ 13:33)  2.24 ratio (01-12-25 @ 16:13)  1.97 ratio (01-13-25 @ 05:37)  1.49 ratio (01-14-25 @ 08:30)  2.34 ratio (01-15-25 @ 05:40)      Warfarin administration history:  warfarin: 7.5 milliGRAM(s) (01-09-25 @ 21:11)  warfarin: 10 milliGRAM(s) (01-10-25 @ 22:11)  warfarin: 10 milliGRAM(s) (01-13-25 @ 21:01)  warfarin: 12.5 milliGRAM(s) (01-14-25 @ 22:12)        1. INR today is:  2.34             [   ] below goal ----- This is likely due to                                            [  x ] at goal                                            [   ] above goal ------ This is likely due to        2. Recommend Warfarin  8mg  PO x 1. Patient received a load of Warfarin 12.5mg po x 1 yesterday, INR jumped by 0.85 from 1.49 on 01/14 to 2.34 on 01/15. Recommend Warfarin 8mg po x 1 for tonight to avoid supratherapeutic INR. Warfarin 8mg dose also reflects 20% dose reduction from home dose as patient was supratherapeutic upon arrival to the hospital.  3. Obtain INR tomorrow AM

## 2025-01-15 NOTE — CONSULT NOTE ADULT - TIME BILLING
Chart review of current notes, labs, imaging and past admission documentation, counseling, patient education and reduction counseling, coordination of care and documentation of treatment plan.

## 2025-01-16 LAB
ALBUMIN SERPL ELPH-MCNC: 3.7 G/DL — SIGNIFICANT CHANGE UP (ref 3.5–5.2)
ALP SERPL-CCNC: 45 U/L — SIGNIFICANT CHANGE UP (ref 30–115)
ALT FLD-CCNC: 10 U/L — SIGNIFICANT CHANGE UP (ref 0–41)
ANION GAP SERPL CALC-SCNC: 7 MMOL/L — SIGNIFICANT CHANGE UP (ref 7–14)
AST SERPL-CCNC: 16 U/L — SIGNIFICANT CHANGE UP (ref 0–41)
BASOPHILS # BLD AUTO: 0.05 K/UL — SIGNIFICANT CHANGE UP (ref 0–0.2)
BASOPHILS NFR BLD AUTO: 1.5 % — HIGH (ref 0–1)
BILIRUB SERPL-MCNC: 0.3 MG/DL — SIGNIFICANT CHANGE UP (ref 0.2–1.2)
BUN SERPL-MCNC: 8 MG/DL — LOW (ref 10–20)
C3 SERPL-MCNC: 113 MG/DL — SIGNIFICANT CHANGE UP (ref 81–157)
C4 SERPL-MCNC: 29 MG/DL — SIGNIFICANT CHANGE UP (ref 13–39)
CALCIUM SERPL-MCNC: 8.6 MG/DL — SIGNIFICANT CHANGE UP (ref 8.4–10.5)
CHLORIDE SERPL-SCNC: 104 MMOL/L — SIGNIFICANT CHANGE UP (ref 98–110)
CO2 SERPL-SCNC: 30 MMOL/L — SIGNIFICANT CHANGE UP (ref 17–32)
CREAT SERPL-MCNC: 0.9 MG/DL — SIGNIFICANT CHANGE UP (ref 0.7–1.5)
EGFR: 80 ML/MIN/1.73M2 — SIGNIFICANT CHANGE UP
EOSINOPHIL # BLD AUTO: 0.11 K/UL — SIGNIFICANT CHANGE UP (ref 0–0.7)
EOSINOPHIL NFR BLD AUTO: 3.3 % — SIGNIFICANT CHANGE UP (ref 0–8)
GLUCOSE SERPL-MCNC: 74 MG/DL — SIGNIFICANT CHANGE UP (ref 70–99)
HCT VFR BLD CALC: 40.3 % — SIGNIFICANT CHANGE UP (ref 37–47)
HGB BLD-MCNC: 13 G/DL — SIGNIFICANT CHANGE UP (ref 12–16)
IMM GRANULOCYTES NFR BLD AUTO: 0 % — LOW (ref 0.1–0.3)
INR BLD: 2.83 RATIO — HIGH (ref 0.65–1.3)
LYMPHOCYTES # BLD AUTO: 2.12 K/UL — SIGNIFICANT CHANGE UP (ref 1.2–3.4)
LYMPHOCYTES # BLD AUTO: 63.7 % — HIGH (ref 20.5–51.1)
MAGNESIUM SERPL-MCNC: 1.9 MG/DL — SIGNIFICANT CHANGE UP (ref 1.8–2.4)
MCHC RBC-ENTMCNC: 31.6 PG — HIGH (ref 27–31)
MCHC RBC-ENTMCNC: 32.3 G/DL — SIGNIFICANT CHANGE UP (ref 32–37)
MCV RBC AUTO: 97.8 FL — SIGNIFICANT CHANGE UP (ref 81–99)
MONOCYTES # BLD AUTO: 0.37 K/UL — SIGNIFICANT CHANGE UP (ref 0.1–0.6)
MONOCYTES NFR BLD AUTO: 11.1 % — HIGH (ref 1.7–9.3)
NEUTROPHILS # BLD AUTO: 0.68 K/UL — LOW (ref 1.4–6.5)
NEUTROPHILS NFR BLD AUTO: 20.4 % — LOW (ref 42.2–75.2)
NRBC # BLD: 0 /100 WBCS — SIGNIFICANT CHANGE UP (ref 0–0)
NRBC BLD-RTO: 0 /100 WBCS — SIGNIFICANT CHANGE UP (ref 0–0)
PLATELET # BLD AUTO: 184 K/UL — SIGNIFICANT CHANGE UP (ref 130–400)
PMV BLD: 10.1 FL — SIGNIFICANT CHANGE UP (ref 7.4–10.4)
POTASSIUM SERPL-MCNC: 3.6 MMOL/L — SIGNIFICANT CHANGE UP (ref 3.5–5)
POTASSIUM SERPL-SCNC: 3.6 MMOL/L — SIGNIFICANT CHANGE UP (ref 3.5–5)
PROT SERPL-MCNC: 5.8 G/DL — LOW (ref 6–8)
PROTHROM AB SERPL-ACNC: 34.1 SEC — HIGH (ref 9.95–12.87)
RBC # BLD: 4.12 M/UL — LOW (ref 4.2–5.4)
RBC # FLD: 11.5 % — SIGNIFICANT CHANGE UP (ref 11.5–14.5)
SODIUM SERPL-SCNC: 141 MMOL/L — SIGNIFICANT CHANGE UP (ref 135–146)
WBC # BLD: 3.33 K/UL — LOW (ref 4.8–10.8)
WBC # FLD AUTO: 3.33 K/UL — LOW (ref 4.8–10.8)

## 2025-01-16 PROCEDURE — 99233 SBSQ HOSP IP/OBS HIGH 50: CPT

## 2025-01-16 RX ORDER — DIPHENHYDRAMINE HCL 25 MG
25 CAPSULE ORAL ONCE
Refills: 0 | Status: COMPLETED | OUTPATIENT
Start: 2025-01-16 | End: 2025-01-16

## 2025-01-16 RX ORDER — DEXAMETHASONE SODIUM PHOSPHATE 4 MG/ML
6 INJECTION, SOLUTION INTRA-ARTICULAR; INTRALESIONAL; INTRAMUSCULAR; INTRAVENOUS; SOFT TISSUE ONCE
Refills: 0 | Status: COMPLETED | OUTPATIENT
Start: 2025-01-16 | End: 2025-01-16

## 2025-01-16 RX ORDER — MORPHINE SULFATE 60 MG/1
2 TABLET, FILM COATED, EXTENDED RELEASE ORAL EVERY 6 HOURS
Refills: 0 | Status: DISCONTINUED | OUTPATIENT
Start: 2025-01-16 | End: 2025-01-17

## 2025-01-16 RX ORDER — BACTERIOSTATIC SODIUM CHLORIDE 0.9 %
1000 VIAL (ML) INJECTION
Refills: 0 | Status: DISCONTINUED | OUTPATIENT
Start: 2025-01-16 | End: 2025-01-17

## 2025-01-16 RX ORDER — WARFARIN SODIUM 2 MG/1
8 TABLET ORAL ONCE
Refills: 0 | Status: COMPLETED | OUTPATIENT
Start: 2025-01-16 | End: 2025-01-16

## 2025-01-16 RX ORDER — ALPRAZOLAM 2 MG
0.5 TABLET ORAL ONCE
Refills: 0 | Status: DISCONTINUED | OUTPATIENT
Start: 2025-01-16 | End: 2025-01-16

## 2025-01-16 RX ADMIN — DEXAMETHASONE SODIUM PHOSPHATE 6 MILLIGRAM(S): 4 INJECTION, SOLUTION INTRA-ARTICULAR; INTRALESIONAL; INTRAMUSCULAR; INTRAVENOUS; SOFT TISSUE at 17:39

## 2025-01-16 RX ADMIN — PREDNISONE 2 MILLIGRAM(S): 5 TABLET ORAL at 05:07

## 2025-01-16 RX ADMIN — MORPHINE SULFATE 2 MILLIGRAM(S): 60 TABLET, FILM COATED, EXTENDED RELEASE ORAL at 17:39

## 2025-01-16 RX ADMIN — Medication 1 MILLIGRAM(S): at 11:45

## 2025-01-16 RX ADMIN — SUCRALFATE 1 GRAM(S): 1 SUSPENSION ORAL at 11:45

## 2025-01-16 RX ADMIN — PREGABALIN CAPSULES, CV 75 MILLIGRAM(S): 225 CAPSULE ORAL at 05:07

## 2025-01-16 RX ADMIN — WARFARIN SODIUM 8 MILLIGRAM(S): 2 TABLET ORAL at 21:42

## 2025-01-16 RX ADMIN — CALCIUM ACETATE 667 MILLIGRAM(S): 667 CAPSULE ORAL at 08:22

## 2025-01-16 RX ADMIN — POLYETHYLENE GLYCOL 3350 17 GRAM(S): 17 POWDER, FOR SOLUTION ORAL at 05:07

## 2025-01-16 RX ADMIN — PANTOPRAZOLE 40 MILLIGRAM(S): 20 TABLET, DELAYED RELEASE ORAL at 05:07

## 2025-01-16 RX ADMIN — SUCRALFATE 1 GRAM(S): 1 SUSPENSION ORAL at 05:07

## 2025-01-16 RX ADMIN — PROCHLORPERAZINE MALEATE 5 MILLIGRAM(S): 5 TABLET ORAL at 05:06

## 2025-01-16 RX ADMIN — PROCHLORPERAZINE MALEATE 5 MILLIGRAM(S): 5 TABLET ORAL at 21:08

## 2025-01-16 RX ADMIN — ONDANSETRON 4 MILLIGRAM(S): 4 TABLET, ORALLY DISINTEGRATING ORAL at 08:22

## 2025-01-16 RX ADMIN — CALCIUM ACETATE 667 MILLIGRAM(S): 667 CAPSULE ORAL at 11:46

## 2025-01-16 RX ADMIN — Medication 0.5 MILLIGRAM(S): at 21:52

## 2025-01-16 RX ADMIN — PROCHLORPERAZINE MALEATE 5 MILLIGRAM(S): 5 TABLET ORAL at 17:40

## 2025-01-16 RX ADMIN — Medication 70 MILLILITER(S): at 21:08

## 2025-01-16 NOTE — PROGRESS NOTE ADULT - TIME BILLING
Previous RN notified writer 
Coordination of care

## 2025-01-16 NOTE — PROGRESS NOTE ADULT - ATTENDING COMMENTS
43yo female with persistent nausea/vomiting, poor po tolerance likely multifactorial as noted in setting of chronic disease, constipation, cannabis use. Continue bowel regimen, PPI. Avoid narcotics if possible, discussed marijuana cessation. Follow up GE study.
agree with note as above
patient seen and examined independently on morning rounds for the first time today, chart reviewed and discussed with the medicine resident and on interdisciplinary rounds and agree with the above resident progress note with the following addendum:    in brief, 45 yo woman with h/o SLE/RA (on chronic prednisone x7yrs), PE on coumadin and h/o prior salpingooophorectomy for ectopic pregnancy who p/w abdominal pain and admitted to medicine service.  hospital day #4  -s/p EGD on 1/9/25: showing erosive gastritis and duodenitis  -not tolerating advancing diet---remains on clear liquids this morning and had single spoon of oatmeal   -slowly advance diet  -continue protonix 40 mg bid x 8 wks then change to daily  -f/u with GI as outpatient  -reglan started yesterday 5 mg prior to meals and seems to be improving symptoms  -taper off zofran (currently getting zofran iv q8hr rtc)  -monitor electrolytes--suppl prn-   -lmp 2 months ago----->bhcg slightly elevated after indeterminate urine pregnancy test indeterminate---will repeat Bhcg and check US--gyn eval if any new findings (last pap done as outaptient >5 years ago---->will need gyn f/u as outpaetint)  -daily inr for coumadin dosing---coumadin clinic as outaptient  -patient says dr. Aguirre is pcp but she wants to change----advise to f[facility] at [facility] for post-dc appt (will also need gyn, rheum and  GI f/u)    anticipate likely dc in next 24 hrs once able to tolerate oral intake----patients  is also admitted on telemetry unit with plans for dc today/tomorrow---  DVT/GI ppx    FULL CODE    Total time spent to complete patient's bedside assessment, review medical chart, discuss medical plan of care with covering medical team was more than 50 minutes  with >50% of time spendt face to face with patient, discussion with patient/family and/or coordination of care
43yo female with persistent nausea/vomiting likely multifactorial as noted in setting of chronic constipation, narcotic use, chronic disease and cannabis use. EGD showing gastritis no evidence of H pylori. Recommend PPI, bowel regimen, avoid narcotics if possible. Await GE study results.
45yo female with persistent nausea/vomiting and poor po tolerance likely multifactorial. Follow up EGD path results. Recommend RUQ US r/o gallbladder disease, bowel regimen and minimize narcotics if possible. Recommend outpt GE study.
45yo female with persistent nausea/vomiting and poor po tolerance, likely multifactorial in setting of gastritis, medications/prednisone, cannabis use, constipation. US unremarkable. Recommend bowel regimen as noted, minimize narcotics if possible, avoid marijuana. Await EGD pathology results and obtain GE study when able.

## 2025-01-16 NOTE — PHARMACOTHERAPY INTERVENTION NOTE - COMMENTS
44yFemale    Indication: hx PE and Lupus  INR Goal: 2-3  Home Dose: 10 mg daily  Bridge Therapy:    Current Medications:  bisacodyl 5 milliGRAM(s) Oral at bedtime  calcium acetate 667 milliGRAM(s) Oral three times a day with meals  folic acid 1 milliGRAM(s) Oral daily  ondansetron Injectable 4 milliGRAM(s) IV Push every 8 hours PRN  oxycodone    5 mG/acetaminophen 325 mG 2 Tablet(s) Oral every 8 hours PRN  pantoprazole    Tablet 40 milliGRAM(s) Oral two times a day  polyethylene glycol 3350 17 Gram(s) Oral two times a day  predniSONE   Tablet 2 milliGRAM(s) Oral daily  pregabalin 75 milliGRAM(s) Oral two times a day  prochlorperazine   Injectable 5 milliGRAM(s) IV Push every 8 hours  senna 2 Tablet(s) Oral at bedtime  sodium chloride 0.9%. 1000 milliLiter(s) IV Continuous <Continuous>  sodium chloride 0.9%. 1000 milliLiter(s) IV Continuous <Continuous>  sucralfate 1 Gram(s) Oral every 6 hours  warfarin 8 milliGRAM(s) Oral once      hemoglobin 13.0 g/dL (01-16-25 @ 06:15)    hematocrit 40.3 % (01-16-25 @ 06:15)    PLT: 184 K/uL (01-16-25 @ 06:15)    GFR:80 mL/min/1.73m2 (01-16-25 @ 06:15)      Drug Interactions:     INR trend  1.94 ratio (01-10-25 @ 13:33)  2.24 ratio (01-12-25 @ 16:13)  1.97 ratio (01-13-25 @ 05:37)  1.49 ratio (01-14-25 @ 08:30)  2.34 ratio (01-15-25 @ 05:40)  2.83 ratio (01-16-25 @ 06:15)      Warfarin administration history:  warfarin: 7.5 milliGRAM(s) (01-09-25 @ 21:11)  warfarin: 10 milliGRAM(s) (01-10-25 @ 22:11)  warfarin: 10 milliGRAM(s) (01-13-25 @ 21:01)  warfarin: 12.5 milliGRAM(s) (01-14-25 @ 22:12)  warfarin: 8 milliGRAM(s) (01-15-25 @ 21:42)        1. INR today is:               [   ] below goal ----- This is likely due to                                            [ x  ] at goal                                             [   ] above goal ------ This is likely due to        2. Recommend Warfarin   8 mg        PO x 1 , which is 20% of home dose since patient came in supratherapeutic.   3. Obtain INR tomorrow AM

## 2025-01-16 NOTE — PROGRESS NOTE ADULT - ASSESSMENT
45 y/o female with a PMHX of PE (on warfarin), Lupus, RA, gastritis and ectopic pregnancy s/p L salpingectomy  who presents to the hospital for evaluation of a 1 month history of chest pain/back pain. GI consulted for N/V. Pt has NBNB emesis and decreased PO intake for almost a month now. Denies any  She denies any melena, hematochezia, weight loss, diarrhea, or constipation.     # Nausea/ Vomiting foudn to have erosive gastritis / duodenitis   Likely multifactorial in setting of gastritis vs duodenitis vs medication induced (chronic prednisone and percocet) vs gastroparesis vs chronic marijuana use vs constipation  #constipation   - on Prednisone low dose for lupus   - EGD 2015 done for vomiting showed gastritis   - Hgb 14   - CT AP noted as above  - Abdominal exam benign   - On Coumadin for PE   - patient states she has not had a bowel movement in several days. States she is usually constipated, on daily opiates.     EGD 1/9: erosive gastritis, duodenitis. HP negative     RUQUS WNL  Repeat labs WNL  Gastric emptying study: pending  Patient states had liquid bowel movement overnight 1/15  NO further BMs    Plan   - Treat constipation; standing miralax BID, dulcolax qd and senna qhs    - Would trial Movantik 25 mg once daily  - FU gastric emptying study   - Smaller more frequent meals   - conservative treatment for now with PPI PO BID for 8 weeks and Carafate 1G QID  - antiemetics / IVF as needed   - workup of elevated bHCG  - advance diet as tolerated   - Avoid NSAIDs and opiates, consider alternatives in pain control. Patient does not believe her daily opiate use is significant enough to contribute to symptoms  - Strict Cannibis cessation, discussed with patient. She does not believe it is contributing to symptoms   - Follow up with our [facility] located at [address] [place] [address]. Phone Number: [phone]

## 2025-01-16 NOTE — PROGRESS NOTE ADULT - ASSESSMENT
45 y/o female with a PMHX of PE (on warfarin), Lupus,  RA, gastritis and ectopic pregnancy s/p L salpingectomy  who presents to the hospital for evaluation of a 1 month history of intractable abdominal pain. Patient reports that the pain started in early December and has recently been associated with vomiting NBNB emesis and decreased PO intake.     Abdominal pain / Nausea / Vomiting likely due to Erosive gastritis/duodenitis   H/O chronic PE  SLE / RA  H/O Marijuana use                 PLAN:    ·	KUB noted. Moderate colonic stool burden. Nonobstructive bowel gas pattern.   ·	Cont bowel regimen.   ·	RUQ US noted. Unremarkable.   ·	Cont Miralax, Senna two tablets qhs and Bisacodyl 5 mg po qhs.   ·	Pt's N/V could be multifactorial as per GI f/u. Recommended GES.   ·	N/V could be from SLE flare also. Check C3, C4 and anti double strength DNA Abs.   ·	Marijuana can stay in the system for 30 days. Check urine Cannabis level. Pt stopped taking when she came to hospital. R/O Marijuana withdrawal. Addiction Medicine constult  ·	EKG on admission: NSR 74/min (interpreted by me)  ·	Troponin: <6--> <6  ·	CTA chest is negative for PE  ·	CT abd/pelvis is unremarkable  ·	S/P EGD on 1/9. Normal esophagus. Erosive gastritis and duodenitis.   ·	GI recommended Protonix 40 mg po q 12h for 8 weeks, then 40 mg po daily  ·	Cont Sucralfate  ·	Cont Reglan 5 mg po half an hour before meals.   ·	Duloxetine and Pregabalin can cause N/V. Hold Duloxetine for now  ·	Cont Prednisone dose 2 mg po daily  ·	INR is 2.34. D/C Lovenox. Give Coumadin as per pharmacy recommendation. Check INR in AM    Progress Note Handoff    Pending (specify):  Consults__Addiction Medicine_______, Tests__Gastric emptying study______, Test Results_______, Other_Nausea/Vomiting________  Family discussion:  Disposition: Home___/SNF___/Other________/Unknown at this time________    Ankit Francois MD  Spectra: [phone] 45 y/o female with a PMHX of PE (on warfarin), Lupus,  RA, gastritis and ectopic pregnancy s/p L salpingectomy  who presents to the hospital for evaluation of a 1 month history of intractable abdominal pain. Patient reports that the pain started in early December and has recently been associated with vomiting NBNB emesis and decreased PO intake.     Abdominal pain / Nausea / Vomiting likely due to Erosive gastritis/duodenitis   H/O chronic PE  SLE / RA  H/O Marijuana use                 PLAN:    ·	KUB noted. Moderate colonic stool burden. Nonobstructive bowel gas pattern.   ·	Cont bowel regimen.   ·	RUQ US noted. Unremarkable.   ·	Cont Miralax, Senna two tablets qhs and Bisacodyl 5 mg po qhs.   ·	Pt's N/V could be multifactorial as per GI f/u. Recommended GES.   ·	N/V could be from SLE flare also. Check C3, C4 and anti double strength DNA Abs.   ·	Rheumatology eval  ·	Marijuana can stay in the system for 30 days. Check urine Cannabis level.   ·	Addiction Medicine eval noted. Unlikely Marijuana withdrawal.   ·	As per Addiction Medicine "chronic cannabis use can paradoxically lead to desensitization of CB1 receptors in the CNS and gut leading to dysregulation of normal GI function, can lead to delayed gastric emptying and lead to exaggerated vomiting reflexes and abdominal discomfort".   ·	EKG on admission: NSR 74/min (interpreted by me)  ·	Troponin: <6--> <6  ·	CTA chest is negative for PE  ·	CT abd/pelvis is unremarkable  ·	S/P EGD on 1/9. Normal esophagus. Erosive gastritis and duodenitis.   ·	GI recommended Protonix 40 mg po q 12h for 8 weeks, then 40 mg po daily  ·	Cont Sucralfate  ·	Cont Reglan 5 mg po half an hour before meals.   ·	Duloxetine and Pregabalin can cause N/V. Hold Duloxetine for now  ·	Cont Prednisone dose 2 mg po daily  ·	INR is 2.83. Give Coumadin as per pharmacy recommendation. Check INR in AM    Progress Note Handoff    Pending (specify):  Consults__Rheumatology eval_______, Tests__Gastric emptying study______, Test Results_______, Other_Nausea/Vomiting________  Family discussion:  Disposition: Home___/SNF___/Other________/Unknown at this time________    Ankit Francois MD  Spectra: [phone]

## 2025-01-16 NOTE — PROGRESS NOTE ADULT - SUBJECTIVE AND OBJECTIVE BOX
SUBJECTIVE/OVERNIGHT EVENTS  Today is hospital day 9d. This morning patient was seen and examined at bedside, resting comfortably in bed. No acute or major events overnight.    HPI:  45 y/o female with a PMHX of PE (on warfarin), Lupus,  RA, gastritis and ectopic pregnancy s/p L salpingectomy  who presents to the hospital for evaluation of a 1 month history of intractable abdominal pain. Patient reports that the pain started in early December and has recently been associated with vomiting NBNB emesis and decreased PO intake. She denies any fever, chills, headache, shortness of breath, diarrhea or dysuria     Of note patient has been taking low dose prednisone for the past 7 years after her last lupus flare.     ED Course      Vitals: Temp 98.4, HR 79, /86, RR 20     Imaging: CT PE/ Abd/Pelvis: No evidence of PE or acute intraabdominal pathology     Labs: + for BHCG (5.9)             (07 Jan 2025 10:47)      MEDICATIONS  STANDING MEDICATIONS  bisacodyl 5 milliGRAM(s) Oral at bedtime  calcium acetate 667 milliGRAM(s) Oral three times a day with meals  folic acid 1 milliGRAM(s) Oral daily  pantoprazole    Tablet 40 milliGRAM(s) Oral two times a day  polyethylene glycol 3350 17 Gram(s) Oral two times a day  predniSONE   Tablet 2 milliGRAM(s) Oral daily  pregabalin 75 milliGRAM(s) Oral two times a day  prochlorperazine   Injectable 5 milliGRAM(s) IV Push every 8 hours  senna 2 Tablet(s) Oral at bedtime  sodium chloride 0.9%. 1000 milliLiter(s) IV Continuous <Continuous>  sodium chloride 0.9%. 1000 milliLiter(s) IV Continuous <Continuous>  sucralfate 1 Gram(s) Oral every 6 hours    PRN MEDICATIONS  ondansetron Injectable 4 milliGRAM(s) IV Push every 8 hours PRN  oxycodone    5 mG/acetaminophen 325 mG 2 Tablet(s) Oral every 8 hours PRN    VITALS  T(F): 97.3 (01-16-25 @ 04:00), Max: 97.8 (01-15-25 @ 12:00)  HR: 55 (01-16-25 @ 04:00) (55 - 77)  BP: 101/66 (01-16-25 @ 04:00) (101/66 - 124/81)  RR: 18 (01-15-25 @ 20:20) (18 - 18)  SpO2: 100% (01-16-25 @ 04:00) (98% - 100%)          PHYSICAL EXAM      GENERAL: No acute distress, well-developed  HEAD:  Atraumatic, Normocephalic  ENT: PERRL, conjunctiva and sclera clear, neck supple, no JVD, moist mucosa, posterior oropharynx clear  CHEST/LUNG: Clear to auscultation bilaterally; No wheeze, equal breath sounds bilaterally, respirations nonlabored  HEART: Regular rate and rhythm; No murmurs, rubs, or gallops  ABDOMEN: Soft, nontender, nondistended; Bowel sounds present, no organomegaly  BACK: no spinal tenderness, no CVA tenderness  EXTREMITIES:  No clubbing, cyanosis, or edema  PSYCH: Nl behavior, nl affect  NEUROLOGY: AAOx3, non-focal, moves all extremities spontaneously  SKIN: Normal color, No rashes or lesions        PAST MEDICAL & SURGICAL HISTORY:  Lupus (systemic lupus erythematosus)      Pulmonary embolism      Fibromyalgia      Gastritis      Rheumatoid arthritis      H/O abdominal surgery            LABS             13.0   3.33  )-----------( 184      ( 01-16-25 @ 06:15 )             40.3     141  |  104  |  8   -------------------------<  74   01-16-25 @ 06:15  3.6  |  30  |  0.9    Ca      8.6     01-16-25 @ 06:15  Mg     1.9     01-16-25 @ 06:15    TPro  5.8  /  Alb  3.7  /  TBili  0.3  /  DBili  x   /  AST  16  /  ALT  10  /  AlkPhos  45  /  GGT  x     01-16-25 @ 06:15    PT/INR - ( 01-16-25 @ 06:15 )   PT: 34.10 sec[H];   INR: 2.83 ratio[H]      Urinalysis Basic - ( 16 Jan 2025 06:15 )    Color: x / Appearance: x / SG: x / pH: x  Gluc: 74 mg/dL / Ketone: x  / Bili: x / Urobili: x   Blood: x / Protein: x / Nitrite: x   Leuk Esterase: x / RBC: x / WBC x   Sq Epi: x / Non Sq Epi: x / Bacteria: x          IMAGING

## 2025-01-16 NOTE — PROGRESS NOTE ADULT - SUBJECTIVE AND OBJECTIVE BOX
DAMIAN PRESLEY  44y Female    CHIEF COMPLAINT:    Patient is a 44y old  Female who presents with a chief complaint of Intractable abdominal pain (15 Julius 2025 18:11)      INTERVAL HPI/OVERNIGHT EVENTS:    Patient seen and examined.    ROS: All other systems are negative.    Vital Signs:    T(F): 97.3 (01-16-25 @ 04:00), Max: 97.8 (01-15-25 @ 12:00)  HR: 55 (01-16-25 @ 04:00) (55 - 77)  BP: 101/66 (01-16-25 @ 04:00) (101/66 - 124/81)  RR: 18 (01-15-25 @ 20:20) (18 - 18)  SpO2: 100% (01-16-25 @ 04:00) (98% - 100%)  I&O's Summary    15 Julius 2025 07:01  -  16 Jan 2025 07:00  --------------------------------------------------------  IN: 700 mL / OUT: 0 mL / NET: 700 mL      Daily     Daily   CAPILLARY BLOOD GLUCOSE          PHYSICAL EXAM:    GENERAL:  NAD  SKIN: No rashes or lesions  HENT: Atraumatic. Normocephalic. PERRL. Moist membranes.  NECK: Supple, No JVD. No lymphadenopathy.  PULMONARY: CTA B/L. No wheezing. No rales  CVS: Normal S1, S2. Rate and Rhythm are regular. No murmurs.  ABDOMEN/GI: Soft, Nontender, Nondistended; BS present  EXTREMITIES: Peripheral pulses intact. No edema B/L LE.  NEUROLOGIC:  No motor or sensory deficit.  PSYCH: Alert & oriented x 3    Consultant(s) Notes Reviewed:  [x ] YES  [ ] NO  Care Discussed with Consultants/Other Providers [ x] YES  [ ] NO    EKG reviewed  Telemetry reviewed    LABS:                        13.0   3.33  )-----------( 184      ( 16 Jan 2025 06:15 )             40.3     01-16    141  |  104  |  8[L]  ----------------------------<  74  3.6   |  30  |  0.9    Ca    8.6      16 Jan 2025 06:15  Mg     1.9     01-16    TPro  5.8[L]  /  Alb  3.7  /  TBili  0.3  /  DBili  x   /  AST  16  /  ALT  10  /  AlkPhos  45  01-16    PT/INR - ( 16 Jan 2025 06:15 )   PT: 34.10 sec;   INR: 2.83 ratio                   RADIOLOGY & ADDITIONAL TESTS:      Imaging or report Personally Reviewed:  [ ] YES  [ ] NO    Medications:  Standing  bisacodyl 5 milliGRAM(s) Oral at bedtime  calcium acetate 667 milliGRAM(s) Oral three times a day with meals  folic acid 1 milliGRAM(s) Oral daily  pantoprazole    Tablet 40 milliGRAM(s) Oral two times a day  polyethylene glycol 3350 17 Gram(s) Oral two times a day  predniSONE   Tablet 2 milliGRAM(s) Oral daily  pregabalin 75 milliGRAM(s) Oral two times a day  prochlorperazine   Injectable 5 milliGRAM(s) IV Push every 8 hours  senna 2 Tablet(s) Oral at bedtime  sodium chloride 0.9%. 1000 milliLiter(s) IV Continuous <Continuous>  sodium chloride 0.9%. 1000 milliLiter(s) IV Continuous <Continuous>  sucralfate 1 Gram(s) Oral every 6 hours    PRN Meds  ondansetron Injectable 4 milliGRAM(s) IV Push every 8 hours PRN  oxycodone    5 mG/acetaminophen 325 mG 2 Tablet(s) Oral every 8 hours PRN      Case discussed with resident    Care discussed with pt/family           DAMIAN PRESLEY  44y Female    CHIEF COMPLAINT:    Patient is a 44y old  Female who presents with a chief complaint of Intractable abdominal pain (15 Julius 2025 18:11)      INTERVAL HPI/OVERNIGHT EVENTS:    Patient seen and examined. Pt complains that there is no improvement in her N/V    ROS: All other systems are negative.    Vital Signs:    T(F): 97.3 (01-16-25 @ 04:00), Max: 97.8 (01-15-25 @ 12:00)  HR: 55 (01-16-25 @ 04:00) (55 - 77)  BP: 101/66 (01-16-25 @ 04:00) (101/66 - 124/81)  RR: 18 (01-15-25 @ 20:20) (18 - 18)  SpO2: 100% (01-16-25 @ 04:00) (98% - 100%)  I&O's Summary    15 Julius 2025 07:01  -  16 Jan 2025 07:00  --------------------------------------------------------  IN: 700 mL / OUT: 0 mL / NET: 700 mL      Daily     Daily   CAPILLARY BLOOD GLUCOSE          PHYSICAL EXAM:    GENERAL:  NAD  SKIN: No rashes or lesions  HENT: Atraumatic. Normocephalic. PERRL. Moist membranes.  NECK: Supple, No JVD. No lymphadenopathy.  PULMONARY: CTA B/L. No wheezing. No rales  CVS: Normal S1, S2. Rate and Rhythm are regular. No murmurs.  ABDOMEN/GI: Soft, Nontender, Nondistended; BS present  EXTREMITIES: Peripheral pulses intact. No edema B/L LE.  NEUROLOGIC:  No motor or sensory deficit.  PSYCH: Alert & oriented x 3    Consultant(s) Notes Reviewed:  [x ] YES  [ ] NO  Care Discussed with Consultants/Other Providers [ x] YES  [ ] NO    EKG reviewed  Telemetry reviewed    LABS:                        13.0   3.33  )-----------( 184      ( 16 Jan 2025 06:15 )             40.3     01-16    141  |  104  |  8[L]  ----------------------------<  74  3.6   |  30  |  0.9    Ca    8.6      16 Jan 2025 06:15  Mg     1.9     01-16    TPro  5.8[L]  /  Alb  3.7  /  TBili  0.3  /  DBili  x   /  AST  16  /  ALT  10  /  AlkPhos  45  01-16    PT/INR - ( 16 Jan 2025 06:15 )   PT: 34.10 sec;   INR: 2.83 ratio                   RADIOLOGY & ADDITIONAL TESTS:      Imaging or report Personally Reviewed:  [ ] YES  [ ] NO    Medications:  Standing  bisacodyl 5 milliGRAM(s) Oral at bedtime  calcium acetate 667 milliGRAM(s) Oral three times a day with meals  folic acid 1 milliGRAM(s) Oral daily  pantoprazole    Tablet 40 milliGRAM(s) Oral two times a day  polyethylene glycol 3350 17 Gram(s) Oral two times a day  predniSONE   Tablet 2 milliGRAM(s) Oral daily  pregabalin 75 milliGRAM(s) Oral two times a day  prochlorperazine   Injectable 5 milliGRAM(s) IV Push every 8 hours  senna 2 Tablet(s) Oral at bedtime  sodium chloride 0.9%. 1000 milliLiter(s) IV Continuous <Continuous>  sodium chloride 0.9%. 1000 milliLiter(s) IV Continuous <Continuous>  sucralfate 1 Gram(s) Oral every 6 hours    PRN Meds  ondansetron Injectable 4 milliGRAM(s) IV Push every 8 hours PRN  oxycodone    5 mG/acetaminophen 325 mG 2 Tablet(s) Oral every 8 hours PRN      Case discussed with resident    Care discussed with pt/family

## 2025-01-16 NOTE — PROGRESS NOTE ADULT - ASSESSMENT
45 y/o female with a PMHX of PE (on warfarin), Lupus,  RA, gastritis and ectopic pregnancy s/p L salpingectomy  who presents to the hospital for evaluation of a 1 month history of intractable abdominal pain. Patient reports that the pain started in early December and has recently been associated with vomiting NBNB emesis and decreased PO intake. She denies any fever, chills, headache, shortness of breath, diarrhea or dysuria .Of note patient has been taking low dose prednisone for the past 7 years after her last lupus flare.     #Suspected gastritis 2/2 to chronic steroid use   #chronic abdominal pain  #chest pain  #Intractable NV  -patient has been taking low dose prednisone for 7 years (currently on 5mg daily)  -epigastric pain>1 month in duration not associated with meals or viral prodrome   -c/w carafate   -c/w home percocet & lyrica  for anxiety  -S/P EGD on 1/9. Normal esophagus. Erosive gastritis and duodenitis. f/u biopsy, outpatient GI f/u  -Continue Protonix 40 mg po q 12h for 8 weeks, then 40 mg po daily  -cont Sucralfate  -Still having N/V. started on compazine  -Diet currently soft and bite sized.   -Pending improvement oral intake for DC  -Repeat serum HCG in 72 hours: 6  -Gi following: Fu RUQ US -WNL. Bowel regimen for constipation. Fu gastric emptying study    #Hx of PE  -daily INR   -warfarin dosing as per pharmacy   -INR at goal today  - warfarin 10mg x1 today > f/u INR     #Hx of Lupus  #Hx of RA  -follows with Dr. Sim Sharpe  -no reported recent flairs   -Decreased Prednisone dose to 2 mg po daily    #MISC  DVT prophylaxis: warfarin   GI prophylaxis: Pantoprazole   Diet: Regular   Activity: IAT   Pending: Gastric emptying study

## 2025-01-16 NOTE — CONSULT NOTE ADULT - CONSULT REASON
45 yo female patient presents for dental clearance for TAVR surgery.
Cracked tooth, Inflammation, Pain
cannabis hyperemesis
Nausea/ vomiting

## 2025-01-16 NOTE — PROGRESS NOTE ADULT - SUBJECTIVE AND OBJECTIVE BOX
Gastroenterology progress note:     Patient is a 44y old  Female who presents with a chief complaint of Intractable abdominal pain (16 Jan 2025 16:22)       Admitted on: 01-07-25    We are following the patient for:  emesis    Interval History:    No acute events overnight.     PAST MEDICAL & SURGICAL HISTORY:  Lupus (systemic lupus erythematosus)      Pulmonary embolism      Fibromyalgia      Gastritis      Rheumatoid arthritis      H/O abdominal surgery          MEDICATIONS  (STANDING):  bisacodyl 5 milliGRAM(s) Oral at bedtime  calcium acetate 667 milliGRAM(s) Oral three times a day with meals  dexAMETHasone  Injectable 6 milliGRAM(s) IV Push once  diphenhydrAMINE 25 milliGRAM(s) Oral once  folic acid 1 milliGRAM(s) Oral daily  pantoprazole    Tablet 40 milliGRAM(s) Oral two times a day  polyethylene glycol 3350 17 Gram(s) Oral two times a day  predniSONE   Tablet 2 milliGRAM(s) Oral daily  pregabalin 75 milliGRAM(s) Oral two times a day  prochlorperazine   Injectable 5 milliGRAM(s) IV Push every 8 hours  senna 2 Tablet(s) Oral at bedtime  sodium chloride 0.9%. 1000 milliLiter(s) (70 mL/Hr) IV Continuous <Continuous>  sodium chloride 0.9%. 1000 milliLiter(s) (70 mL/Hr) IV Continuous <Continuous>  sucralfate 1 Gram(s) Oral every 6 hours  warfarin 8 milliGRAM(s) Oral once    MEDICATIONS  (PRN):  morphine  - Injectable 2 milliGRAM(s) IV Push every 6 hours PRN Severe Pain (7 - 10)  ondansetron Injectable 4 milliGRAM(s) IV Push every 8 hours PRN Nausea and/or Vomiting  oxycodone    5 mG/acetaminophen 325 mG 2 Tablet(s) Oral every 8 hours PRN Severe Pain (7 - 10)      Allergies  No Known Drug Allergies  Tomatoes (Hives)  &quot;cold plasma&quot; (Hives)      Review of Systems:   Cardiovascular:  No Chest Pain, No Palpitations  Respiratory:  No Cough, No Dyspnea  Gastrointestinal:  As described in HPI  Skin:  No Skin Lesions, No Jaundice  Neuro:  No Syncope, No Dizziness    Physical Examination:  T(C): 36.4 (01-16-25 @ 12:05), Max: 36.6 (01-15-25 @ 20:20)  HR: 61 (01-16-25 @ 12:05) (55 - 69)  BP: 120/70 (01-16-25 @ 12:05) (101/66 - 124/81)  RR: 18 (01-16-25 @ 12:05) (18 - 18)  SpO2: 100% (01-16-25 @ 04:00) (98% - 100%)      01-15-25 @ 07:01  -  01-16-25 @ 07:00  --------------------------------------------------------  IN: 700 mL / OUT: 0 mL / NET: 700 mL        GENERAL: AAOx3, no acute distress.  HEAD:  Atraumatic, Normocephalic  EYES: conjunctiva and sclera clear  NECK: Supple, no JVD or thyromegaly  CHEST/LUNG: Clear to auscultation bilaterally; No wheeze, rhonchi, or rales  HEART: Regular rate and rhythm; normal S1, S2, No murmurs.  ABDOMEN: Soft, nontender, nondistended; Bowel sounds present  NEUROLOGY: No asterixis or tremor.   SKIN: Intact, no jaundice     Data:                        13.0   3.33  )-----------( 184      ( 16 Jan 2025 06:15 )             40.3     Hgb trend:  13.0  01-16-25 @ 06:15  14.7  01-15-25 @ 05:40  14.3  01-14-25 @ 11:08        01-16    141  |  104  |  8[L]  ----------------------------<  74  3.6   |  30  |  0.9    Ca    8.6      16 Jan 2025 06:15  Mg     1.9     01-16    TPro  5.8[L]  /  Alb  3.7  /  TBili  0.3  /  DBili  x   /  AST  16  /  ALT  10  /  AlkPhos  45  01-16    Liver panel trend:  TBili 0.3   /   AST 16   /   ALT 10   /   AlkP 45   /   Tptn 5.8   /   Alb 3.7    /   DBili --      01-16  TBili 0.5   /   AST 17   /   ALT 10   /   AlkP 52   /   Tptn 6.4   /   Alb 4.1    /   DBili --      01-15  TBili 0.4   /   AST 17   /   ALT 13   /   AlkP 50   /   Tptn 6.1   /   Alb 4.0    /   DBili --      01-08  TBili 0.4   /   AST 18   /   ALT 13   /   AlkP 54   /   Tptn 6.4   /   Alb 4.0    /   DBili --      01-07  TBili 0.3   /   AST 19   /   ALT 14   /   AlkP 56   /   Tptn 6.8   /   Alb 4.4    /   DBili --      01-07      PT/INR - ( 16 Jan 2025 06:15 )   PT: 34.10 sec;   INR: 2.83 ratio                Radiology:

## 2025-01-16 NOTE — CONSULT NOTE ADULT - ASSESSMENT
45 y/o female with a PMHX of PE (on warfarin), Lupus, RA, gastritis and ectopic pregnancy s/p L salpingectomy  who presents to the hospital for evaluation of a 1 month history of chest pain/back pain. GI consulted for N/V. Pt has NBNB emesis and decreased PO intake for almost a month now. Denies any  She denies any melena, hematochezia, weight loss, diarrhea, or constipation.       # Nausea/ Vomiting   DDx include gastritis vs PUD vs gastroparesis , low suspicion for adrenal insufficiency given normal electrolytes/ normal BM/ and no recent changes in prednisone dose   - on Prednisone low dose for lupus   - EGD 2015 done for vomiting showed gastritis   - Hgb 14   - CT AP noted as above  - Abdominal exam benign   - On Coumadin for PE       Plan   - Will plan for EGD as inpatient tomorrow if pt is not discharged vs OP through [facility] (please obtain OBGYN clearance prior to EGD as pt with indeterminant pregnancy test and has hx of ectopic pregnancy and no menstrual period for the past 3 months)   - conservative treatment for now with PPI PO BID and Carafate 1G QID  - antiemetics / IVF  - advance diet as tolerated   - Ok to do EGD on coumadin since it's diagnostic and not therapeutic   - needs to be educated about streroid stress dosing in acute illness   - Avoid NSAIDs 
Patient is a 44y old  Female who presents with a chief complaint of Intractable abdominal pain (16 Jan 2025 10:37).    HPI:  43 y/o female with a PMHX of PE (on warfarin), Lupus,  RA, gastritis and ectopic pregnancy s/p L salpingectomy  who presents to the hospital for evaluation of a 1 month history of intractable abdominal pain. Patient reports that the pain started in early December and has recently been associated with vomiting NBNB emesis and decreased PO intake. She denies any fever, chills, headache, shortness of breath, diarrhea or dysuria     Of note patient has been taking low dose prednisone for the past 7 years after her last lupus flare.     ED Course      Vitals: Temp 98.4, HR 79, /86, RR 20     Imaging: CT PE/ Abd/Pelvis: No evidence of PE or acute intraabdominal pathology     Labs: + for BHCG (5.9)    PAST MEDICAL & SURGICAL HISTORY:  Lupus (systemic lupus erythematosus)  Pulmonary embolism  Fibromyalgia  Gastritis  Rheumatoid arthritis  H/O abdominal surgery    MEDICATIONS  (STANDING):  bisacodyl 5 milliGRAM(s) Oral at bedtime  calcium acetate 667 milliGRAM(s) Oral three times a day with meals  diphenhydrAMINE 25 milliGRAM(s) Oral once  folic acid 1 milliGRAM(s) Oral daily  pantoprazole    Tablet 40 milliGRAM(s) Oral two times a day  polyethylene glycol 3350 17 Gram(s) Oral two times a day  predniSONE   Tablet 2 milliGRAM(s) Oral daily  pregabalin 75 milliGRAM(s) Oral two times a day  prochlorperazine   Injectable 5 milliGRAM(s) IV Push every 8 hours  senna 2 Tablet(s) Oral at bedtime  sodium chloride 0.9%. 1000 milliLiter(s) (70 mL/Hr) IV Continuous <Continuous>  sodium chloride 0.9%. 1000 milliLiter(s) (70 mL/Hr) IV Continuous <Continuous>  sucralfate 1 Gram(s) Oral every 6 hours  warfarin 8 milliGRAM(s) Oral once    MEDICATIONS  (PRN):  ondansetron Injectable 4 milliGRAM(s) IV Push every 8 hours PRN Nausea and/or Vomiting  oxycodone    5 mG/acetaminophen 325 mG 2 Tablet(s) Oral every 8 hours PRN Severe Pain (7 - 10)    Allergies  No Known Drug Allergies  Tomatoes (Hives)  &quot;cold plasma&quot; (Hives)    FAMILY HISTORY:  Family history of CHF (congestive heart failure) (Mother)    Vital Signs Last 24 Hrs  T(C): 36.4 (16 Jan 2025 12:05), Max: 36.6 (15 Julius 2025 20:20)  T(F): 97.5 (16 Jan 2025 12:05), Max: 97.8 (15 Julius 2025 20:20)  HR: 61 (16 Jan 2025 12:05) (55 - 69)  BP: 120/70 (16 Jan 2025 12:05) (101/66 - 124/81)  BP(mean): --  RR: 18 (16 Jan 2025 12:05) (18 - 18)  SpO2: 100% (16 Jan 2025 04:00) (98% - 100%)    Parameters below as of 15 Julius 2025 20:20  Patient On (Oxygen Delivery Method): room air    LABS:                        13.0   3.33  )-----------( 184      ( 16 Jan 2025 06:15 )             40.3     01-16    141  |  104  |  8[L]  ----------------------------<  74  3.6   |  30  |  0.9    Ca    8.6      16 Jan 2025 06:15  Mg     1.9     01-16    TPro  5.8[L]  /  Alb  3.7  /  TBili  0.3  /  DBili  x   /  AST  16  /  ALT  10  /  AlkPhos  45  01-16    WBC Count: 3.33 K/uL *L* [4.80 - 10.80] (01-16 @ 06:15)  Platelet Count - Automated: 184 K/uL [130 - 400] (01-16 @ 06:15)  INR: 2.83 ratio *H* [0.65 - 1.30] (01-16 @ 06:15)  WBC Count: 5.39 K/uL [4.80 - 10.80] (01-15 @ 05:40)  Platelet Count - Automated: 198 K/uL [130 - 400] (01-15 @ 05:40)  INR: 2.34 ratio *H* [0.65 - 1.30] (01-15 @ 05:40)  WBC Count: 3.51 K/uL *L* [4.80 - 10.80] (01-14 @ 11:08)  Platelet Count - Automated: 218 K/uL [130 - 400] (01-14 @ 11:08)  INR: 1.49 ratio *H* [0.65 - 1.30] (01-14 @ 08:30)    Urinalysis Basic - ( 16 Jan 2025 06:15 )    Color: x / Appearance: x / SG: x / pH: x  Gluc: 74 mg/dL / Ketone: x  / Bili: x / Urobili: x   Blood: x / Protein: x / Nitrite: x   Leuk Esterase: x / RBC: x / WBC x   Sq Epi: x / Non Sq Epi: x / Bacteria: x    PT/INR - ( 16 Jan 2025 06:15 )   PT: 34.10 sec;   INR: 2.83 ratio      EOE:  TMJ ( - ) clicks                     ( - ) pops                     ( - ) crepitus             Mandible ZELALEM within normal limits             Facial bones and MOM <<grossly intact>>             ( - ) trismus             ( - ) lymphadenopathy             ( - ) swelling             ( - ) asymmetry             ( - ) palpation             ( - ) dyspnea             ( - ) dysphagia             ( - ) loss of consciousness    IOE:  <<permanent>> dentition: <<grossly intact>>            hard/soft palate: <<No pathology noted>>           tongue/FOM <<No pathology noted>>           labial/buccal mucosa <<No pathology noted>>           ( - ) percussion           ( - ) palpation           ( - ) swelling            ( - ) abscess           ( - ) sinus tract    *ASSESSMENT: Clinical exam performed at bedside. Extraoral exam reveals no significant findings; no signs of infection, swelling or inflammation. Intraoral exam reveals existing dentition is intact. Tooth #32 has extensive caries and likely requires extraction, patient informed. Informed patient that she will be transported down to dental for further radiographic evaluation and treatment, if necessary.    *PLAN: Transport arranged for patient to be brought down to dental clinic at [facility] for evaluation and necessary treatment.    Resident Name, pager #: Em Parrish DDS Spect[facility] [phone]
Patient is a 44y old  Female who presents with a chief complaint of Intractable abdominal pain (16 Jan 2025 12:47). Patient presents to dental clinic for TAVR surgery clearance.      HPI:  45 y/o female with a PMHX of PE (on warfarin), Lupus,  RA, gastritis and ectopic pregnancy s/p L salpingectomy  who presents to the hospital for evaluation of a 1 month history of intractable abdominal pain. Patient reports that the pain started in early December and has recently been associated with vomiting NBNB emesis and decreased PO intake. She denies any fever, chills, headache, shortness of breath, diarrhea or dysuria     Of note patient has been taking low dose prednisone for the past 7 years after her last lupus flare.     ED Course      Vitals: Temp 98.4, HR 79, /86, RR 20     Imaging: CT PE/ Abd/Pelvis: No evidence of PE or acute intraabdominal pathology     Labs: + for BHCG (5.9)             (07 Jan 2025 10:47)      PAST MEDICAL & SURGICAL HISTORY:  Lupus (systemic lupus erythematosus)      Pulmonary embolism      Fibromyalgia      Gastritis      Rheumatoid arthritis      H/O abdominal surgery        ( - ) heart valve replacement  ( - ) joint replacement  ( - ) pregnancy    MEDICATIONS  (STANDING):  bisacodyl 5 milliGRAM(s) Oral at bedtime  calcium acetate 667 milliGRAM(s) Oral three times a day with meals  dexAMETHasone  Injectable 6 milliGRAM(s) IV Push once  diphenhydrAMINE 25 milliGRAM(s) Oral once  folic acid 1 milliGRAM(s) Oral daily  pantoprazole    Tablet 40 milliGRAM(s) Oral two times a day  polyethylene glycol 3350 17 Gram(s) Oral two times a day  predniSONE   Tablet 2 milliGRAM(s) Oral daily  pregabalin 75 milliGRAM(s) Oral two times a day  prochlorperazine   Injectable 5 milliGRAM(s) IV Push every 8 hours  senna 2 Tablet(s) Oral at bedtime  sodium chloride 0.9%. 1000 milliLiter(s) (70 mL/Hr) IV Continuous <Continuous>  sodium chloride 0.9%. 1000 milliLiter(s) (70 mL/Hr) IV Continuous <Continuous>  sucralfate 1 Gram(s) Oral every 6 hours  warfarin 8 milliGRAM(s) Oral once    MEDICATIONS  (PRN):  ondansetron Injectable 4 milliGRAM(s) IV Push every 8 hours PRN Nausea and/or Vomiting  oxycodone    5 mG/acetaminophen 325 mG 2 Tablet(s) Oral every 8 hours PRN Severe Pain (7 - 10)      Allergies    No Known Drug Allergies  Tomatoes (Hives)  &quot;cold plasma&quot; (Hives)    Intolerances        FAMILY HISTORY:  Family history of CHF (congestive heart failure) (Mother)        *SOCIAL HISTORY: ( + ) Tobacco; ( - ) ETOH      Vital Signs Last 24 Hrs  T(C): 36.4 (16 Jan 2025 12:05), Max: 36.6 (15 Julius 2025 20:20)  T(F): 97.5 (16 Jan 2025 12:05), Max: 97.8 (15 Julius 2025 20:20)  HR: 61 (16 Jan 2025 12:05) (55 - 69)  BP: 120/70 (16 Jan 2025 12:05) (101/66 - 124/81)  BP(mean): --  RR: 18 (16 Jan 2025 12:05) (18 - 18)  SpO2: 100% (16 Jan 2025 04:00) (98% - 100%)    Parameters below as of 15 Julius 2025 20:20  Patient On (Oxygen Delivery Method): room air        LABS:                        13.0   3.33  )-----------( 184      ( 16 Jan 2025 06:15 )             40.3     01-16    141  |  104  |  8[L]  ----------------------------<  74  3.6   |  30  |  0.9    Ca    8.6      16 Jan 2025 06:15  Mg     1.9     01-16    TPro  5.8[L]  /  Alb  3.7  /  TBili  0.3  /  DBili  x   /  AST  16  /  ALT  10  /  AlkPhos  45  01-16    WBC Count: 3.33 K/uL *L* [4.80 - 10.80] (01-16 @ 06:15)  Platelet Count - Automated: 184 K/uL [130 - 400] (01-16 @ 06:15)  INR: 2.83 ratio *H* [0.65 - 1.30] (01-16 @ 06:15)  WBC Count: 5.39 K/uL [4.80 - 10.80] (01-15 @ 05:40)  Platelet Count - Automated: 198 K/uL [130 - 400] (01-15 @ 05:40)  INR: 2.34 ratio *H* [0.65 - 1.30] (01-15 @ 05:40)  WBC Count: 3.51 K/uL *L* [4.80 - 10.80] (01-14 @ 11:08)  Platelet Count - Automated: 218 K/uL [130 - 400] (01-14 @ 11:08)  INR: 1.49 ratio *H* [0.65 - 1.30] (01-14 @ 08:30)    Urinalysis Basic - ( 16 Jan 2025 06:15 )    Color: x / Appearance: x / SG: x / pH: x  Gluc: 74 mg/dL / Ketone: x  / Bili: x / Urobili: x   Blood: x / Protein: x / Nitrite: x   Leuk Esterase: x / RBC: x / WBC x   Sq Epi: x / Non Sq Epi: x / Bacteria: x        PT/INR - ( 16 Jan 2025 06:15 )   PT: 34.10 sec;   INR: 2.83 ratio           EOE:  TMJ ( - ) clicks                     ( - ) pops                     ( - ) crepitus             Mandible FROM             Facial bones and MOM grossly intact             ( - ) trismus             ( - ) lymphadenopathy             ( - ) swelling             ( - ) asymmetry             ( - ) palpation             ( - ) dyspnea             ( - ) dysphagia             ( - ) loss of consciousness    EOE reveals no significant findings.    IOE:  permanent dentition: multiple carious and missing teeth           hard/soft palate: No pathology noted           tongue/FOM No pathology noted           labial/buccal mucosa No pathology noted           ( - ) percussion           ( - ) palpation           ( - ) swelling            ( - ) abscess           ( - ) sinus tract    IOE reveals generalized staining and plaque/calculus; carious tooth #32; and class 1 mobility of teeth #23, 24, 25 and 26.    Dentition present: <<y>>  Mobility: <<y>>  Caries: <<y>>         *DENTAL RADIOGRAPHS: Full mouth series shows generalized severe subgingival calculus and severe bone loss around teeth #15 and 16    RADIOLOGY & ADDITIONAL STUDIES: None    *ASSESSMENT: Nonrestorable tooth #32; poor prognosis of teeth #15 and 16 (teeth #23, 24, 25 and 26 not an aspiration risk)      *PLAN: Full mouth debridement, nonsurgical extraction of teeth #15, 16 and 32    PROCEDURE:   Verbal and written consent given.  Anesthesia: 2 carpules of 2% Lidocaine (1:100,000 epinephrine) via inferior alveolar nerve block; 2 carpules of 4% Septocaine (1:100,000 epinephrine) via local infiltration  Treatment: Treatment consequences explained as per OS sheet dated 7/13/00. Risks and benefits discussed. Consent obtained and side site completed. Administered anesthetic as described. Full mouth debridement completed with cavitron and hand scalers. Teeth #15, 16 and 32 delivered from mouth with throat screen in place. Curettaged and irrigated sites with saline. Two simple 3-0 chromic gut sutures placed (one at site of #15/16 and one at site of #32) to re-approximate loose tissue. Hemostasis achieved. Post-operative instructions given. Recommendations given as detailed below. Patient released in stable condition.    Patient optimized for TAVR surgery from a dental perspective.    RECOMMENDATIONS:  1) Pain medication as per primary care team  2) Dental F/U with outpatient dentist for comprehensive dental care as needed post discharge    Resident Name: Alva Fong DDS; pager #[phone]
44 y domciled Female with a PMHX of PE (on warfarin), Lupus, RA, gastritis, ectopic pregnancy s/p L salpingectomy and PPHx of  who presents to the hospital for evaluation of a 1 month history of intractable abdominal pain, nausea and vomiting admitted to medicine for workup. Addiction Medicine consulted for concern for cannabis hyperemesis syndrome.     #Cannabis use:  - patient counseled on the risks of prolonged consistent exposure to cannabis use with symptoms such as hyperemesis, worsening anxiety, paranoia, sleep irregularities, gastritis, blurred vision, headache.  - chronic cannabis use can paradoxically lead to desensitization of CB1 receptors in the CNS and gut leading to dysregulation of normal GI function, can lead to delayed gastric emptying and lead to exaggerated vomiting reflexes and abdominal discomfort.   - diagnosis of Cannabis Hyperemesis Syndrome (CHS) is based off of modified Brian IV criteria with greater than 3 episodes a year, duration of >1 year and frequency of use 4 or more times per week and resolution of symptoms after a period of 6 months free from cannabis or after a period of time noting 3 typical cycles of symptoms. Based on Kneeland IV criteria, patient does not meet the diagnosis of cannabis hyperemesis syndrome.   - patient understands that gastritis findings on imaging can lead to her symptoms of abdominal pain, nausea and vomiting and that the likely cause of her symptoms is probably multifactorial. Reviewed her recent 2 month aspirin and biggest inciting agent causing risk factors for erosion and her long term steroids worsening her healing ability of erosions. Reviewed importance of PPIs in her treatment.   - patient amenable to cutting back on cannabis use at this time given information reviewed but is also asking for further inquiry into her symptoms, possibly rheumatologic causes of her presentation. Would recommend informal or formal rheumatology consult to review her case.  - no FDA approved treatments for N/V with cannabis besides removing the agent. Hot showers can assist with mild symptoms and capsaicin based topical creams (not on formulary in hospital).     #Chronic Pain 2/2 autoimmune conditions:  - sees pain, is prescribed percocet 5-10 mg q6-8 hours as needed. Patient states that she never misuses her medication and takes it only as prescribed. Okay to continue as needed for pain in the hospital. Unlikely to be cause of her intractable symptoms.   - agree with holding pregabalin and duloxetine for now given GI symptoms.        Thank you for this consult. Rest of care per primary. Addiction medicine will sign off. Please reach out with any questions or concerns via TEAMS.

## 2025-01-17 LAB
ALBUMIN SERPL ELPH-MCNC: 3.8 G/DL — SIGNIFICANT CHANGE UP (ref 3.5–5.2)
ALP SERPL-CCNC: 52 U/L — SIGNIFICANT CHANGE UP (ref 30–115)
ALT FLD-CCNC: 14 U/L — SIGNIFICANT CHANGE UP (ref 0–41)
ANION GAP SERPL CALC-SCNC: 8 MMOL/L — SIGNIFICANT CHANGE UP (ref 7–14)
APTT 50/50 2HOUR INCUB: 32.4 SEC — SIGNIFICANT CHANGE UP (ref 24.5–36.6)
APTT BLD: 32.3 SEC — SIGNIFICANT CHANGE UP (ref 24.5–36.6)
APTT BLD: 41.8 SEC — HIGH (ref 24.5–35.6)
APTT BLD: 45 SEC — HIGH (ref 27–39.2)
AST SERPL-CCNC: 23 U/L — SIGNIFICANT CHANGE UP (ref 0–41)
BASOPHILS # BLD AUTO: 0.02 K/UL — SIGNIFICANT CHANGE UP (ref 0–0.2)
BASOPHILS NFR BLD AUTO: 0.4 % — SIGNIFICANT CHANGE UP (ref 0–1)
BILIRUB SERPL-MCNC: 0.4 MG/DL — SIGNIFICANT CHANGE UP (ref 0.2–1.2)
BUN SERPL-MCNC: 7 MG/DL — LOW (ref 10–20)
CALCIUM SERPL-MCNC: 9 MG/DL — SIGNIFICANT CHANGE UP (ref 8.4–10.5)
CHLORIDE SERPL-SCNC: 109 MMOL/L — SIGNIFICANT CHANGE UP (ref 98–110)
CO2 SERPL-SCNC: 25 MMOL/L — SIGNIFICANT CHANGE UP (ref 17–32)
CREAT SERPL-MCNC: 0.7 MG/DL — SIGNIFICANT CHANGE UP (ref 0.7–1.5)
DRVVT RATIO: 0.99 RATIO — SIGNIFICANT CHANGE UP (ref 0–1.21)
DRVVT SCREEN TO CONFIRM RATIO: SIGNIFICANT CHANGE UP
DSDNA AB SER-ACNC: 1 IU/ML — SIGNIFICANT CHANGE UP
EGFR: 108 ML/MIN/1.73M2 — SIGNIFICANT CHANGE UP
EOSINOPHIL # BLD AUTO: 0 K/UL — SIGNIFICANT CHANGE UP (ref 0–0.7)
EOSINOPHIL NFR BLD AUTO: 0 % — SIGNIFICANT CHANGE UP (ref 0–8)
GLUCOSE SERPL-MCNC: 97 MG/DL — SIGNIFICANT CHANGE UP (ref 70–99)
HCT VFR BLD CALC: 39.7 % — SIGNIFICANT CHANGE UP (ref 37–47)
HGB BLD-MCNC: 12.8 G/DL — SIGNIFICANT CHANGE UP (ref 12–16)
IMM GRANULOCYTES NFR BLD AUTO: 0.2 % — SIGNIFICANT CHANGE UP (ref 0.1–0.3)
INR BLD: 2.07 RATIO — HIGH (ref 0.65–1.3)
LYMPHOCYTES # BLD AUTO: 0.76 K/UL — LOW (ref 1.2–3.4)
LYMPHOCYTES # BLD AUTO: 14.1 % — LOW (ref 20.5–51.1)
MAGNESIUM SERPL-MCNC: 1.8 MG/DL — SIGNIFICANT CHANGE UP (ref 1.8–2.4)
MCHC RBC-ENTMCNC: 31.1 PG — HIGH (ref 27–31)
MCHC RBC-ENTMCNC: 32.2 G/DL — SIGNIFICANT CHANGE UP (ref 32–37)
MCV RBC AUTO: 96.4 FL — SIGNIFICANT CHANGE UP (ref 81–99)
MONOCYTES # BLD AUTO: 0.19 K/UL — SIGNIFICANT CHANGE UP (ref 0.1–0.6)
MONOCYTES NFR BLD AUTO: 3.5 % — SIGNIFICANT CHANGE UP (ref 1.7–9.3)
NEUTROPHILS # BLD AUTO: 4.42 K/UL — SIGNIFICANT CHANGE UP (ref 1.4–6.5)
NEUTROPHILS NFR BLD AUTO: 81.8 % — HIGH (ref 42.2–75.2)
NORMALIZED SCT PPP-RTO: 0.94 RATIO — SIGNIFICANT CHANGE UP (ref 0–1.16)
NORMALIZED SCT PPP-RTO: SIGNIFICANT CHANGE UP
NRBC # BLD: 0 /100 WBCS — SIGNIFICANT CHANGE UP (ref 0–0)
NRBC BLD-RTO: 0 /100 WBCS — SIGNIFICANT CHANGE UP (ref 0–0)
PAT CTL 2H: 32.4 SEC — SIGNIFICANT CHANGE UP (ref 24.5–36.6)
PLATELET # BLD AUTO: 202 K/UL — SIGNIFICANT CHANGE UP (ref 130–400)
PMV BLD: 10.8 FL — HIGH (ref 7.4–10.4)
POTASSIUM SERPL-MCNC: 4.4 MMOL/L — SIGNIFICANT CHANGE UP (ref 3.5–5)
POTASSIUM SERPL-SCNC: 4.4 MMOL/L — SIGNIFICANT CHANGE UP (ref 3.5–5)
PROT SERPL-MCNC: 6 G/DL — SIGNIFICANT CHANGE UP (ref 6–8)
PROTHROM AB SERPL-ACNC: 24.8 SEC — HIGH (ref 9.95–12.87)
RBC # BLD: 4.12 M/UL — LOW (ref 4.2–5.4)
RBC # FLD: 11.1 % — LOW (ref 11.5–14.5)
SODIUM SERPL-SCNC: 142 MMOL/L — SIGNIFICANT CHANGE UP (ref 135–146)
WBC # BLD: 5.4 K/UL — SIGNIFICANT CHANGE UP (ref 4.8–10.8)
WBC # FLD AUTO: 5.4 K/UL — SIGNIFICANT CHANGE UP (ref 4.8–10.8)

## 2025-01-17 PROCEDURE — 99233 SBSQ HOSP IP/OBS HIGH 50: CPT

## 2025-01-17 RX ORDER — WARFARIN SODIUM 2 MG/1
10 TABLET ORAL ONCE
Refills: 0 | Status: COMPLETED | OUTPATIENT
Start: 2025-01-17 | End: 2025-01-17

## 2025-01-17 RX ORDER — ONDANSETRON 4 MG/1
4 TABLET, ORALLY DISINTEGRATING ORAL ONCE
Refills: 0 | Status: COMPLETED | OUTPATIENT
Start: 2025-01-17 | End: 2025-01-18

## 2025-01-17 RX ORDER — METOCLOPRAMIDE 10 MG/1
10 TABLET ORAL EVERY 8 HOURS
Refills: 0 | Status: DISCONTINUED | OUTPATIENT
Start: 2025-01-17 | End: 2025-01-19

## 2025-01-17 RX ORDER — NALOXEGOL OXALATE 12.5 MG/1
25 TABLET, FILM COATED ORAL DAILY
Refills: 0 | Status: DISCONTINUED | OUTPATIENT
Start: 2025-01-17 | End: 2025-01-19

## 2025-01-17 RX ORDER — ONDANSETRON 4 MG/1
8 TABLET, ORALLY DISINTEGRATING ORAL EVERY 8 HOURS
Refills: 0 | Status: DISCONTINUED | OUTPATIENT
Start: 2025-01-17 | End: 2025-01-19

## 2025-01-17 RX ORDER — ALPRAZOLAM 2 MG
0.5 TABLET ORAL ONCE
Refills: 0 | Status: DISCONTINUED | OUTPATIENT
Start: 2025-01-17 | End: 2025-01-17

## 2025-01-17 RX ADMIN — Medication 1 MILLIGRAM(S): at 13:26

## 2025-01-17 RX ADMIN — Medication 0.5 MILLIGRAM(S): at 18:47

## 2025-01-17 RX ADMIN — CALCIUM ACETATE 667 MILLIGRAM(S): 667 CAPSULE ORAL at 13:26

## 2025-01-17 RX ADMIN — SUCRALFATE 1 GRAM(S): 1 SUSPENSION ORAL at 23:50

## 2025-01-17 RX ADMIN — CALCIUM ACETATE 667 MILLIGRAM(S): 667 CAPSULE ORAL at 17:47

## 2025-01-17 RX ADMIN — PANTOPRAZOLE 40 MILLIGRAM(S): 20 TABLET, DELAYED RELEASE ORAL at 17:47

## 2025-01-17 RX ADMIN — POLYETHYLENE GLYCOL 3350 17 GRAM(S): 17 POWDER, FOR SOLUTION ORAL at 17:47

## 2025-01-17 RX ADMIN — SUCRALFATE 1 GRAM(S): 1 SUSPENSION ORAL at 17:47

## 2025-01-17 RX ADMIN — WARFARIN SODIUM 10 MILLIGRAM(S): 2 TABLET ORAL at 21:49

## 2025-01-17 RX ADMIN — NALOXEGOL OXALATE 25 MILLIGRAM(S): 12.5 TABLET, FILM COATED ORAL at 13:25

## 2025-01-17 RX ADMIN — PROCHLORPERAZINE MALEATE 5 MILLIGRAM(S): 5 TABLET ORAL at 05:11

## 2025-01-17 RX ADMIN — SUCRALFATE 1 GRAM(S): 1 SUSPENSION ORAL at 13:25

## 2025-01-17 RX ADMIN — METOCLOPRAMIDE 10 MILLIGRAM(S): 10 TABLET ORAL at 13:35

## 2025-01-17 RX ADMIN — PROCHLORPERAZINE MALEATE 5 MILLIGRAM(S): 5 TABLET ORAL at 13:26

## 2025-01-17 RX ADMIN — MORPHINE SULFATE 2 MILLIGRAM(S): 60 TABLET, FILM COATED, EXTENDED RELEASE ORAL at 05:10

## 2025-01-17 RX ADMIN — METOCLOPRAMIDE 10 MILLIGRAM(S): 10 TABLET ORAL at 21:49

## 2025-01-17 NOTE — PROGRESS NOTE ADULT - SUBJECTIVE AND OBJECTIVE BOX
SUBJECTIVE/OVERNIGHT EVENTS  Today is hospital day 10d. This morning patient was seen and examined at bedside, resting comfortably in bed. No acute or major events overnight.    HPI:  43 y/o female with a PMHX of PE (on warfarin), Lupus,  RA, gastritis and ectopic pregnancy s/p L salpingectomy  who presents to the hospital for evaluation of a 1 month history of intractable abdominal pain. Patient reports that the pain started in early December and has recently been associated with vomiting NBNB emesis and decreased PO intake. She denies any fever, chills, headache, shortness of breath, diarrhea or dysuria     Of note patient has been taking low dose prednisone for the past 7 years after her last lupus flare.     ED Course      Vitals: Temp 98.4, HR 79, /86, RR 20     Imaging: CT PE/ Abd/Pelvis: No evidence of PE or acute intraabdominal pathology     Labs: + for BHCG (5.9)             (07 Jan 2025 10:47)      MEDICATIONS  STANDING MEDICATIONS  bisacodyl 5 milliGRAM(s) Oral at bedtime  calcium acetate 667 milliGRAM(s) Oral three times a day with meals  folic acid 1 milliGRAM(s) Oral daily  naloxegol 25 milliGRAM(s) Oral daily  pantoprazole    Tablet 40 milliGRAM(s) Oral two times a day  polyethylene glycol 3350 17 Gram(s) Oral two times a day  predniSONE   Tablet 2 milliGRAM(s) Oral daily  prochlorperazine   Injectable 5 milliGRAM(s) IV Push every 8 hours  senna 2 Tablet(s) Oral at bedtime  sucralfate 1 Gram(s) Oral every 6 hours  warfarin 10 milliGRAM(s) Oral once    PRN MEDICATIONS  ondansetron Injectable 4 milliGRAM(s) IV Push every 8 hours PRN  oxycodone    5 mG/acetaminophen 325 mG 2 Tablet(s) Oral every 8 hours PRN    VITALS  T(F): 97.4 (01-17-25 @ 05:00), Max: 97.6 (01-16-25 @ 21:20)  HR: 61 (01-17-25 @ 05:00) (61 - 62)  BP: 130/82 (01-17-25 @ 05:00) (120/70 - 130/82)  RR: 18 (01-16-25 @ 21:20) (18 - 18)  SpO2: 100% (01-17-25 @ 05:00) (96% - 100%)          PHYSICAL EXAM      GENERAL: No acute distress, well-developed  HEAD:  Atraumatic, Normocephalic  ENT: PERRL, conjunctiva and sclera clear, neck supple, no JVD, moist mucosa, posterior oropharynx clear  CHEST/LUNG: Clear to auscultation bilaterally; No wheeze, equal breath sounds bilaterally, respirations nonlabored  HEART: Regular rate and rhythm; No murmurs, rubs, or gallops  ABDOMEN: Soft, nontender, nondistended; Bowel sounds present, no organomegaly  BACK: no spinal tenderness, no CVA tenderness  EXTREMITIES:  No clubbing, cyanosis, or edema  PSYCH: Nl behavior, nl affect  NEUROLOGY: AAOx3, non-focal, moves all extremities spontaneously  SKIN: Normal color, No rashes or lesions        PAST MEDICAL & SURGICAL HISTORY:  Lupus (systemic lupus erythematosus)      Pulmonary embolism      Fibromyalgia      Gastritis      Rheumatoid arthritis      H/O abdominal surgery            LABS             12.8   5.40  )-----------( 202      ( 01-17-25 @ 04:34 )             39.7     142  |  109  |  7   -------------------------<  97   01-17-25 @ 04:34  4.4  |  25  |  0.7    Ca      9.0     01-17-25 @ 04:34  Mg     1.8     01-17-25 @ 04:34    TPro  6.0  /  Alb  3.8  /  TBili  0.4  /  DBili  x   /  AST  23  /  ALT  14  /  AlkPhos  52  /  GGT  x     01-17-25 @ 04:34    PT/INR - ( 01-17-25 @ 04:34 )   PT: 24.80 sec[H];   INR: 2.07 ratio[H]  PTT - ( 01-17-25 @ 04:34 )  PTT:45.0 sec      Urinalysis Basic - ( 17 Jan 2025 04:34 )    Color: x / Appearance: x / SG: x / pH: x  Gluc: 97 mg/dL / Ketone: x  / Bili: x / Urobili: x   Blood: x / Protein: x / Nitrite: x   Leuk Esterase: x / RBC: x / WBC x   Sq Epi: x / Non Sq Epi: x / Bacteria: x          IMAGING SUBJECTIVE/OVERNIGHT EVENTS  Today is hospital day 10d. This morning patient was seen and examined at bedside, resting comfortably in bed. No acute or major events overnight.    HPI:  43 y/o female with a PMHX of PE (on warfarin), Lupus,  RA, gastritis and ectopic pregnancy s/p L salpingectomy  who presents to the hospital for evaluation of a 1 month history of intractable abdominal pain. Patient reports that the pain started in early December and has recently been associated with vomiting NBNB emesis and decreased PO intake. She denies any fever, chills, headache, shortness of breath, diarrhea or dysuria     Of note patient has been taking low dose prednisone for the past 7 years after her last lupus flare.     ED Course      Vitals: Temp 98.4, HR 79, /86, RR 20     Imaging: CT PE/ Abd/Pelvis: No evidence of PE or acute intraabdominal pathology     Labs: + for BHCG (5.9)             (07 Jan 2025 10:47)      MEDICATIONS  STANDING MEDICATIONS  bisacodyl 5 milliGRAM(s) Oral at bedtime  calcium acetate 667 milliGRAM(s) Oral three times a day with meals  folic acid 1 milliGRAM(s) Oral daily  naloxegol 25 milliGRAM(s) Oral daily  pantoprazole    Tablet 40 milliGRAM(s) Oral two times a day  polyethylene glycol 3350 17 Gram(s) Oral two times a day  predniSONE   Tablet 2 milliGRAM(s) Oral daily  prochlorperazine   Injectable 5 milliGRAM(s) IV Push every 8 hours  senna 2 Tablet(s) Oral at bedtime  sucralfate 1 Gram(s) Oral every 6 hours  warfarin 10 milliGRAM(s) Oral once    PRN MEDICATIONS  ondansetron Injectable 4 milliGRAM(s) IV Push every 8 hours PRN  oxycodone    5 mG/acetaminophen 325 mG 2 Tablet(s) Oral every 8 hours PRN    VITALS  T(F): 97.4 (01-17-25 @ 05:00), Max: 97.6 (01-16-25 @ 21:20)  HR: 61 (01-17-25 @ 05:00) (61 - 62)  BP: 130/82 (01-17-25 @ 05:00) (120/70 - 130/82)  RR: 18 (01-16-25 @ 21:20) (18 - 18)  SpO2: 100% (01-17-25 @ 05:00) (96% - 100%)          PHYSICAL EXAM      GENERAL: No acute distress, well-developed  CHEST/LUNG: Clear to auscultation bilaterally; No wheeze, equal breath sounds bilaterally, respirations nonlabored  HEART: Regular rate and rhythm; No murmurs, rubs, or gallops  ABDOMEN: Soft, nontender, nondistended; Bowel sounds present, no organomegaly  NEUROLOGY: AAOx3, non-focal, moves all extremities spontaneously          PAST MEDICAL & SURGICAL HISTORY:  Lupus (systemic lupus erythematosus)      Pulmonary embolism      Fibromyalgia      Gastritis      Rheumatoid arthritis      H/O abdominal surgery            LABS             12.8   5.40  )-----------( 202      ( 01-17-25 @ 04:34 )             39.7     142  |  109  |  7   -------------------------<  97   01-17-25 @ 04:34  4.4  |  25  |  0.7    Ca      9.0     01-17-25 @ 04:34  Mg     1.8     01-17-25 @ 04:34    TPro  6.0  /  Alb  3.8  /  TBili  0.4  /  DBili  x   /  AST  23  /  ALT  14  /  AlkPhos  52  /  GGT  x     01-17-25 @ 04:34    PT/INR - ( 01-17-25 @ 04:34 )   PT: 24.80 sec[H];   INR: 2.07 ratio[H]  PTT - ( 01-17-25 @ 04:34 )  PTT:45.0 sec      Urinalysis Basic - ( 17 Jan 2025 04:34 )    Color: x / Appearance: x / SG: x / pH: x  Gluc: 97 mg/dL / Ketone: x  / Bili: x / Urobili: x   Blood: x / Protein: x / Nitrite: x   Leuk Esterase: x / RBC: x / WBC x   Sq Epi: x / Non Sq Epi: x / Bacteria: x          IMAGING

## 2025-01-17 NOTE — PROGRESS NOTE ADULT - SUBJECTIVE AND OBJECTIVE BOX
DAMIAN PRESLEY  44y Female    CHIEF COMPLAINT:    Patient is a 44y old  Female who presents with a chief complaint of Intractable abdominal pain (17 Jan 2025 11:07)      INTERVAL HPI/OVERNIGHT EVENTS:    Patient seen and examined. Could get the gastric emptying study due to N/V. No abdominal. Keeps having N/V and unable to keep down anything.     ROS: All other systems are negative.    Vital Signs:    T(F): 98.2 (01-17-25 @ 14:55), Max: 98.2 (01-17-25 @ 14:55)  HR: 60 (01-17-25 @ 14:55) (60 - 62)  BP: 128/78 (01-17-25 @ 14:55) (121/78 - 130/82)  RR: 18 (01-17-25 @ 14:55) (18 - 18)  SpO2: 100% (01-17-25 @ 05:00) (96% - 100%)  I&O's Summary    Daily     Daily   CAPILLARY BLOOD GLUCOSE          PHYSICAL EXAM:    GENERAL:  NAD  SKIN: No rashes or lesions  HENT: Atraumatic. Normocephalic. PERRL. Moist membranes.  NECK: Supple, No JVD. No lymphadenopathy.  PULMONARY: CTA B/L. No wheezing. No rales  CVS: Normal S1, S2. Rate and Rhythm are regular. No murmurs.  ABDOMEN/GI: Soft, Nontender, Nondistended; BS present  EXTREMITIES: Peripheral pulses intact. No edema B/L LE.  NEUROLOGIC:  No motor or sensory deficit.  PSYCH: Alert & oriented x 3    Consultant(s) Notes Reviewed:  [x ] YES  [ ] NO  Care Discussed with Consultants/Other Providers [ x] YES  [ ] NO    EKG reviewed  Telemetry reviewed    LABS:                        12.8   5.40  )-----------( 202      ( 17 Jan 2025 04:34 )             39.7     01-17    142  |  109  |  7[L]  ----------------------------<  97  4.4   |  25  |  0.7    Ca    9.0      17 Jan 2025 04:34  Mg     1.8     01-17    TPro  6.0  /  Alb  3.8  /  TBili  0.4  /  DBili  x   /  AST  23  /  ALT  14  /  AlkPhos  52  01-17    PT/INR - ( 17 Jan 2025 04:34 )   PT: 24.80 sec;   INR: 2.07 ratio         PTT - ( 17 Jan 2025 04:34 )  PTT:45.0 sec          RADIOLOGY & ADDITIONAL TESTS:      Imaging or report Personally Reviewed:  [ ] YES  [ ] NO    Medications:  Standing  bisacodyl 5 milliGRAM(s) Oral at bedtime  calcium acetate 667 milliGRAM(s) Oral three times a day with meals  folic acid 1 milliGRAM(s) Oral daily  metoclopramide Injectable 10 milliGRAM(s) IV Push every 8 hours  naloxegol 25 milliGRAM(s) Oral daily  pantoprazole    Tablet 40 milliGRAM(s) Oral two times a day  polyethylene glycol 3350 17 Gram(s) Oral two times a day  predniSONE   Tablet 2 milliGRAM(s) Oral daily  senna 2 Tablet(s) Oral at bedtime  sucralfate 1 Gram(s) Oral every 6 hours  warfarin 10 milliGRAM(s) Oral once    PRN Meds  ondansetron Injectable 4 milliGRAM(s) IV Push once PRN  ondansetron Injectable 8 milliGRAM(s) IV Push every 8 hours PRN  oxycodone    5 mG/acetaminophen 325 mG 2 Tablet(s) Oral every 8 hours PRN      Case discussed with resident    Care discussed with pt/family

## 2025-01-17 NOTE — PHARMACOTHERAPY INTERVENTION NOTE - COMMENTS
44yFemale    Indication: Hx PE and Lupus  INR Goal: 2-3  Home Dose: 10 mg  Bridge Therapy:    Current Medications:  bisacodyl 5 milliGRAM(s) Oral at bedtime  calcium acetate 667 milliGRAM(s) Oral three times a day with meals  folic acid 1 milliGRAM(s) Oral daily  naloxegol 25 milliGRAM(s) Oral daily  ondansetron Injectable 4 milliGRAM(s) IV Push every 8 hours PRN  oxycodone    5 mG/acetaminophen 325 mG 2 Tablet(s) Oral every 8 hours PRN  pantoprazole    Tablet 40 milliGRAM(s) Oral two times a day  polyethylene glycol 3350 17 Gram(s) Oral two times a day  predniSONE   Tablet 2 milliGRAM(s) Oral daily  prochlorperazine   Injectable 5 milliGRAM(s) IV Push every 8 hours  senna 2 Tablet(s) Oral at bedtime  sucralfate 1 Gram(s) Oral every 6 hours  warfarin 10 milliGRAM(s) Oral once      hemoglobin 12.8 g/dL (01-17-25 @ 04:34)    hematocrit 39.7 % (01-17-25 @ 04:34)    PLT: 202 K/uL (01-17-25 @ 04:34)    GFR:108 mL/min/1.73m2 (01-17-25 @ 04:34)      Drug Interactions:     INR trend  1.94 ratio (01-10-25 @ 13:33)  2.24 ratio (01-12-25 @ 16:13)  1.97 ratio (01-13-25 @ 05:37)  1.49 ratio (01-14-25 @ 08:30)  2.34 ratio (01-15-25 @ 05:40)  2.83 ratio (01-16-25 @ 06:15)  2.07 ratio (01-17-25 @ 04:34)      Warfarin administration history:  warfarin: 10 milliGRAM(s) (01-10-25 @ 22:11)  warfarin: 10 milliGRAM(s) (01-13-25 @ 21:01)  warfarin: 12.5 milliGRAM(s) (01-14-25 @ 22:12)  warfarin: 8 milliGRAM(s) (01-15-25 @ 21:42)  warfarin: 8 milliGRAM(s) (01-16-25 @ 21:42)        1. INR today is:               [   ] below goal ----- This is likely due to                                            [x   ] at goal                                            [   ] above goal ------ This is likely due to        2. Agree with provider ordered Warfarin    10       PO x 1 . Patient received 2 doses of warfarin 8 mg and INR decreased from 2.83 to 2.07.   3. Obtain INR tomorrow AM

## 2025-01-17 NOTE — PROGRESS NOTE ADULT - ASSESSMENT
43 y/o female with a PMHX of PE (on warfarin), Lupus,  RA, gastritis and ectopic pregnancy s/p L salpingectomy  who presents to the hospital for evaluation of a 1 month history of intractable abdominal pain. Patient reports that the pain started in early December and has recently been associated with vomiting NBNB emesis and decreased PO intake.     Abdominal pain / Nausea / Vomiting likely due to Erosive gastritis/duodenitis   H/O chronic PE  SLE / RA  H/O Marijuana use                 PLAN:    ·	KUB noted. Moderate colonic stool burden. Nonobstructive bowel gas pattern.   ·	Cont bowel regimen.   ·	RUQ US noted. Unremarkable.   ·	Cont Miralax, Senna two tablets qhs and Bisacodyl 5 mg po qhs.   ·	Pt's N/V could be multifactorial as per GI f/u. Recommended GES. Was unable to get Gastric emptying study due to N/V. Give Zofran half an hour prior to the test  ·	N/V could be from SLE flare also. C3, C4 levels are normal. Check anti double strength DNA Abs. Doubt SLE flare  ·	Rheumatology eval  ·	Marijuana can stay in the system for 30 days. Check urine Cannabis level.   ·	GI f/u noted. Recommended trial of Movantik (Naloxegol) 25 mg once daily  ·	Addiction Medicine eval noted. Unlikely Marijuana withdrawal.   ·	As per Addiction Medicine "chronic cannabis use can paradoxically lead to desensitization of CB1 receptors in the CNS and gut leading to dysregulation of normal GI function, can lead to delayed gastric emptying and lead to exaggerated vomiting reflexes and abdominal discomfort".   ·	EKG on admission: NSR 74/min (interpreted by me)  ·	Troponin: <6--> <6  ·	CTA chest is negative for PE  ·	CT abd/pelvis is unremarkable  ·	S/P EGD on 1/9. Normal esophagus. Erosive gastritis and duodenitis.   ·	GI recommended Protonix 40 mg po q 12h for 8 weeks, then 40 mg po daily  ·	Cont Sucralfate  ·	Cont Reglan 5 mg po half an hour before meals.   ·	Duloxetine and Pregabalin can cause N/V. Hold Duloxetine for now  ·	Cont Prednisone dose 2 mg po daily  ·	INR is 2.07. Give Coumadin as per pharmacy recommendation. Check INR in AM    Progress Note Handoff    Pending (specify):  Consults__Rheumatology eval_______, Tests__Gastric emptying study______, Test Results_______, Other_Nausea/Vomiting________  Family discussion:  Disposition: Home___/SNF___/Other________/Unknown at this time________    Ankit Francois MD  Spectra: [phone]

## 2025-01-18 LAB
ALBUMIN SERPL ELPH-MCNC: 3.5 G/DL — SIGNIFICANT CHANGE UP (ref 3.5–5.2)
ALP SERPL-CCNC: 50 U/L — SIGNIFICANT CHANGE UP (ref 30–115)
ALT FLD-CCNC: 59 U/L — HIGH (ref 0–41)
ANION GAP SERPL CALC-SCNC: 8 MMOL/L — SIGNIFICANT CHANGE UP (ref 7–14)
ANTI-RIBONUCLEAR PROTEIN: 7.9 AI — HIGH
AST SERPL-CCNC: 95 U/L — HIGH (ref 0–41)
B2 GLYCOPROT1 IGA SER QL: <2 U/ML — SIGNIFICANT CHANGE UP
B2 GLYCOPROT1 IGG SER-ACNC: <1.4 U/ML — SIGNIFICANT CHANGE UP
B2 GLYCOPROT1 IGM SER-ACNC: <1.5 U/ML — SIGNIFICANT CHANGE UP
BASOPHILS # BLD AUTO: 0.04 K/UL — SIGNIFICANT CHANGE UP (ref 0–0.2)
BASOPHILS NFR BLD AUTO: 0.8 % — SIGNIFICANT CHANGE UP (ref 0–1)
BILIRUB SERPL-MCNC: 0.3 MG/DL — SIGNIFICANT CHANGE UP (ref 0.2–1.2)
BUN SERPL-MCNC: 10 MG/DL — SIGNIFICANT CHANGE UP (ref 10–20)
CALCIUM SERPL-MCNC: 8.5 MG/DL — SIGNIFICANT CHANGE UP (ref 8.4–10.4)
CARDIOLIPIN IGM SER-MCNC: <1.5 MPL U/ML — SIGNIFICANT CHANGE UP
CARDIOLIPIN IGM SER-MCNC: <1.6 GPL U/ML — SIGNIFICANT CHANGE UP
CHLORIDE SERPL-SCNC: 107 MMOL/L — SIGNIFICANT CHANGE UP (ref 98–110)
CO2 SERPL-SCNC: 27 MMOL/L — SIGNIFICANT CHANGE UP (ref 17–32)
CREAT SERPL-MCNC: 0.8 MG/DL — SIGNIFICANT CHANGE UP (ref 0.7–1.5)
DEPRECATED CARDIOLIPIN IGA SER: <2 APL U/ML — SIGNIFICANT CHANGE UP
EGFR: 92 ML/MIN/1.73M2 — SIGNIFICANT CHANGE UP
ENA SM AB FLD QL: <0.2 AI — SIGNIFICANT CHANGE UP
EOSINOPHIL # BLD AUTO: 0.08 K/UL — SIGNIFICANT CHANGE UP (ref 0–0.7)
EOSINOPHIL NFR BLD AUTO: 1.7 % — SIGNIFICANT CHANGE UP (ref 0–8)
GLUCOSE SERPL-MCNC: 83 MG/DL — SIGNIFICANT CHANGE UP (ref 70–99)
HCT VFR BLD CALC: 38.7 % — SIGNIFICANT CHANGE UP (ref 37–47)
HGB BLD-MCNC: 12.6 G/DL — SIGNIFICANT CHANGE UP (ref 12–16)
IMM GRANULOCYTES NFR BLD AUTO: 0 % — LOW (ref 0.1–0.3)
INR BLD: 2.49 RATIO — HIGH (ref 0.65–1.3)
LYMPHOCYTES # BLD AUTO: 2.59 K/UL — SIGNIFICANT CHANGE UP (ref 1.2–3.4)
LYMPHOCYTES # BLD AUTO: 54.4 % — HIGH (ref 20.5–51.1)
MAGNESIUM SERPL-MCNC: 1.7 MG/DL — LOW (ref 1.8–2.4)
MCHC RBC-ENTMCNC: 31.2 PG — HIGH (ref 27–31)
MCHC RBC-ENTMCNC: 32.6 G/DL — SIGNIFICANT CHANGE UP (ref 32–37)
MCV RBC AUTO: 95.8 FL — SIGNIFICANT CHANGE UP (ref 81–99)
MONOCYTES # BLD AUTO: 0.36 K/UL — SIGNIFICANT CHANGE UP (ref 0.1–0.6)
MONOCYTES NFR BLD AUTO: 7.6 % — SIGNIFICANT CHANGE UP (ref 1.7–9.3)
NEUTROPHILS # BLD AUTO: 1.69 K/UL — SIGNIFICANT CHANGE UP (ref 1.4–6.5)
NEUTROPHILS NFR BLD AUTO: 35.5 % — LOW (ref 42.2–75.2)
NRBC # BLD: 0 /100 WBCS — SIGNIFICANT CHANGE UP (ref 0–0)
NRBC BLD-RTO: 0 /100 WBCS — SIGNIFICANT CHANGE UP (ref 0–0)
PLATELET # BLD AUTO: 188 K/UL — SIGNIFICANT CHANGE UP (ref 130–400)
PMV BLD: 11 FL — HIGH (ref 7.4–10.4)
POTASSIUM SERPL-MCNC: 3.7 MMOL/L — SIGNIFICANT CHANGE UP (ref 3.5–5)
POTASSIUM SERPL-SCNC: 3.7 MMOL/L — SIGNIFICANT CHANGE UP (ref 3.5–5)
PROT SERPL-MCNC: 5.7 G/DL — LOW (ref 6–8)
PROTHROM AB SERPL-ACNC: 29.9 SEC — HIGH (ref 9.95–12.87)
RBC # BLD: 4.04 M/UL — LOW (ref 4.2–5.4)
RBC # FLD: 11.3 % — LOW (ref 11.5–14.5)
SODIUM SERPL-SCNC: 142 MMOL/L — SIGNIFICANT CHANGE UP (ref 135–146)
WBC # BLD: 4.76 K/UL — LOW (ref 4.8–10.8)
WBC # FLD AUTO: 4.76 K/UL — LOW (ref 4.8–10.8)

## 2025-01-18 PROCEDURE — 99233 SBSQ HOSP IP/OBS HIGH 50: CPT

## 2025-01-18 RX ORDER — WARFARIN SODIUM 2 MG/1
10 TABLET ORAL ONCE
Refills: 0 | Status: COMPLETED | OUTPATIENT
Start: 2025-01-18 | End: 2025-01-18

## 2025-01-18 RX ORDER — DIPHENHYDRAMINE HCL 25 MG
25 CAPSULE ORAL ONCE
Refills: 0 | Status: COMPLETED | OUTPATIENT
Start: 2025-01-18 | End: 2025-01-18

## 2025-01-18 RX ORDER — MAGNESIUM OXIDE 400 MG
400 TABLET ORAL
Refills: 0 | Status: DISCONTINUED | OUTPATIENT
Start: 2025-01-18 | End: 2025-01-19

## 2025-01-18 RX ADMIN — CALCIUM ACETATE 667 MILLIGRAM(S): 667 CAPSULE ORAL at 11:37

## 2025-01-18 RX ADMIN — ONDANSETRON 4 MILLIGRAM(S): 4 TABLET, ORALLY DISINTEGRATING ORAL at 21:31

## 2025-01-18 RX ADMIN — PANTOPRAZOLE 40 MILLIGRAM(S): 20 TABLET, DELAYED RELEASE ORAL at 17:11

## 2025-01-18 RX ADMIN — NALOXEGOL OXALATE 25 MILLIGRAM(S): 12.5 TABLET, FILM COATED ORAL at 11:37

## 2025-01-18 RX ADMIN — SUCRALFATE 1 GRAM(S): 1 SUSPENSION ORAL at 05:04

## 2025-01-18 RX ADMIN — ONDANSETRON 8 MILLIGRAM(S): 4 TABLET, ORALLY DISINTEGRATING ORAL at 09:02

## 2025-01-18 RX ADMIN — WARFARIN SODIUM 10 MILLIGRAM(S): 2 TABLET ORAL at 21:24

## 2025-01-18 RX ADMIN — METOCLOPRAMIDE 10 MILLIGRAM(S): 10 TABLET ORAL at 21:24

## 2025-01-18 RX ADMIN — SUCRALFATE 1 GRAM(S): 1 SUSPENSION ORAL at 11:37

## 2025-01-18 RX ADMIN — Medication 25 MILLIGRAM(S): at 21:24

## 2025-01-18 RX ADMIN — ONDANSETRON 8 MILLIGRAM(S): 4 TABLET, ORALLY DISINTEGRATING ORAL at 18:39

## 2025-01-18 RX ADMIN — SUCRALFATE 1 GRAM(S): 1 SUSPENSION ORAL at 23:14

## 2025-01-18 RX ADMIN — METOCLOPRAMIDE 10 MILLIGRAM(S): 10 TABLET ORAL at 13:48

## 2025-01-18 RX ADMIN — CALCIUM ACETATE 667 MILLIGRAM(S): 667 CAPSULE ORAL at 17:11

## 2025-01-18 RX ADMIN — PREDNISONE 2 MILLIGRAM(S): 5 TABLET ORAL at 05:05

## 2025-01-18 RX ADMIN — Medication 1 MILLIGRAM(S): at 11:37

## 2025-01-18 RX ADMIN — METOCLOPRAMIDE 10 MILLIGRAM(S): 10 TABLET ORAL at 05:04

## 2025-01-18 RX ADMIN — POLYETHYLENE GLYCOL 3350 17 GRAM(S): 17 POWDER, FOR SOLUTION ORAL at 17:10

## 2025-01-18 RX ADMIN — Medication 5 MILLIGRAM(S): at 21:24

## 2025-01-18 RX ADMIN — SUCRALFATE 1 GRAM(S): 1 SUSPENSION ORAL at 17:10

## 2025-01-18 RX ADMIN — PANTOPRAZOLE 40 MILLIGRAM(S): 20 TABLET, DELAYED RELEASE ORAL at 05:04

## 2025-01-18 RX ADMIN — Medication 400 MILLIGRAM(S): at 17:11

## 2025-01-18 NOTE — PROGRESS NOTE ADULT - ASSESSMENT
43 y/o female with a PMHX of PE (on warfarin), Lupus,  RA, gastritis and ectopic pregnancy s/p L salpingectomy  who presents to the hospital for evaluation of a 1 month history of intractable abdominal pain. Patient reports that the pain started in early December and has recently been associated with vomiting NBNB emesis and decreased PO intake.     # Gastritis 2/ chronic steroid use  #HO PE  #SLE   #RA  #Ectopic pregnancy s/p warfarin    Plans  - NPO for gastric emptying study today  - PPI 40mg qd and carafate  - f/u rheum eval recs  - f/u GI eval recs  - Continue warfarin (INR 2.9 / PT 29.9)  - Zofran PRN for nausea  - Reglan 10mg q8  - Prednisone 2mg   43 y/o female with a PMHX of PE (on warfarin), Lupus,  RA, gastritis and ectopic pregnancy s/p L salpingectomy  who presents to the hospital for evaluation of a 1 month history of intractable abdominal pain. Patient reports that the pain started in early December and has recently been associated with vomiting NBNB emesis and decreased PO intake.     # Gastritis 2/ chronic steroid use  #HO PE  #SLE   #RA  #Ectopic pregnancy s/p warfarin    Plans  - Plan for discharge if patient can tolerate eating  - PPI 40mg qd and carafate  - f/u rheum eval recs  - f/u GI eval recs  - Continue warfarin (INR 2.9 / PT 29.9)  - Zofran PRN for nausea  - Reglan 10mg q8  - Prednisone 2mg

## 2025-01-18 NOTE — PROGRESS NOTE ADULT - SUBJECTIVE AND OBJECTIVE BOX
24H events:    Patient is a 44y old Female who presents with a chief complaint of Intractable abdominal pain.  Today is hospital day 11d. This morning patient was seen and examined at bedside. Patient continues to endorse lack of appetite and inability to keep any food down. She complains of diffuse abdominal that is chronic. Denies fevers, chills, shortness of breath, cough, chest pain, joint pain, changes in bowel movements, or urinary symptoms.   No acute or major events overnight.    Family communication:  Contact date:  Name of person contacted:  Relationship to patient:  Communication details:  What matters most:    PAST MEDICAL & SURGICAL HISTORY  Lupus (systemic lupus erythematosus)    Pulmonary embolism    Fibromyalgia    Gastritis    Rheumatoid arthritis    H/O abdominal surgery      SOCIAL HISTORY:  Social History:  Lives with  at home, has visiting nurse to assist with ADLS (07 Jan 2025 10:47)      ALLERGIES:  No Known Drug Allergies  Tomatoes (Hives)  &quot;cold plasma&quot; (Hives)    MEDICATIONS:  STANDING MEDICATIONS  bisacodyl 5 milliGRAM(s) Oral at bedtime  calcium acetate 667 milliGRAM(s) Oral three times a day with meals  folic acid 1 milliGRAM(s) Oral daily  metoclopramide Injectable 10 milliGRAM(s) IV Push every 8 hours  naloxegol 25 milliGRAM(s) Oral daily  pantoprazole    Tablet 40 milliGRAM(s) Oral two times a day  polyethylene glycol 3350 17 Gram(s) Oral two times a day  predniSONE   Tablet 2 milliGRAM(s) Oral daily  senna 2 Tablet(s) Oral at bedtime  sucralfate 1 Gram(s) Oral every 6 hours    PRN MEDICATIONS  ondansetron Injectable 4 milliGRAM(s) IV Push once PRN  ondansetron Injectable 8 milliGRAM(s) IV Push every 8 hours PRN  oxycodone    5 mG/acetaminophen 325 mG 2 Tablet(s) Oral every 8 hours PRN    VITALS:   T(F): 97.6  HR: 56  BP: 106/71  RR: 18  SpO2: 97%    PHYSICAL EXAM:  GENERAL:   ( X ) NAD, lying in bed comfortably     (  ) obtunded     (  ) lethargic     (  ) somnolent    HEAD:   (X  ) Atraumatic     (  ) hematoma     (  ) laceration (specify location:       )     NECK:  (X ) Supple     (  ) neck stiffness     (  ) nuchal rigidity     (  )  no JVD     (  ) JVD present ( -- cm)    HEART:  Rate -->     ( X ) normal rate     (  ) bradycardic     (  ) tachycardic  Rhythm -->     ( X ) regular     (  ) regularly irregular     (  ) irregularly irregular  Murmurs -->     ( X) normal s1s2     (  ) systolic murmur     (  ) diastolic murmur     (  ) continuous murmur      (  ) S3 present     (  ) S4 present    LUNGS:   ( X )Unlabored respirations     (  ) tachypnea  ( X ) B/L air entry     (  ) decreased breath sounds in:  (location     )    (X  ) no adventitious sound     (  ) crackles     (  ) wheezing      (  ) rhonchi      (specify location:       )  (  ) chest wall tenderness (specify location:       )    ABDOMEN:   (X  ) Soft     (  ) tense   |   ( X ) nondistended     (  ) distended   |   (X  ) +BS     (  ) hypoactive bowel sounds     (  ) hyperactive bowel sounds  (  ) nontender     (  ) RUQ tenderness     (  ) RLQ tenderness     (  ) LLQ tenderness     (  ) epigastric tenderness     ( X ) diffuse tenderness  (  ) Splenomegaly      (  ) Hepatomegaly      (  ) Jaundice     (  ) ecchymosis     EXTREMITIES:  ( X ) Normal     (  ) Rash     (  ) ecchymosis     (  ) varicose veins      (  ) pitting edema     (  ) non-pitting edema   (  ) ulceration     (  ) gangrene:     (location:     )    NERVOUS SYSTEM:    ( X ) A&Ox3     (  ) confused     (  ) lethargic  CN II-XII:     (  ) Intact     (  ) deficits found     (Specify:     )   Upper extremities:     (  ) no sensorimotor deficits     (  ) weakness     (  ) loss of proprioception/vibration     (  ) loss of touch/temperature (specify:    )  Lower extremities:     (  ) no sensorimotor deficits     (  ) weakness     (  ) loss of proprioception/vibration     (  ) loss of touch/temperature (specify:    )    SKIN:   (X ) No rashes or lesions     (  ) maculopapular rash     (  ) pustules     (  ) vesicles     (  ) ulcer     (  ) ecchymosis     (specify location:     )      LABS:                        12.6   4.76  )-----------( 188      ( 18 Jan 2025 05:41 )             38.7     01-18    142  |  107  |  10  ----------------------------<  83  3.7   |  27  |  0.8    Ca    8.5      18 Jan 2025 05:41  Mg     1.7     01-18    TPro  5.7[L]  /  Alb  3.5  /  TBili  0.3  /  DBili  x   /  AST  95[H]  /  ALT  59[H]  /  AlkPhos  50  01-18    PT/INR - ( 18 Jan 2025 05:41 )   PT: 29.90 sec;   INR: 2.49 ratio         PTT - ( 17 Jan 2025 04:34 )  PTT:45.0 sec  Urinalysis Basic - ( 18 Jan 2025 05:41 )    Color: x / Appearance: x / SG: x / pH: x  Gluc: 83 mg/dL / Ketone: x  / Bili: x / Urobili: x   Blood: x / Protein: x / Nitrite: x   Leuk Esterase: x / RBC: x / WBC x   Sq Epi: x / Non Sq Epi: x / Bacteria: x

## 2025-01-18 NOTE — PROGRESS NOTE ADULT - SUBJECTIVE AND OBJECTIVE BOX
DAMIAN PRESLEY  44y Female    CHIEF COMPLAINT:    Patient is a 44y old  Female who presents with a chief complaint of Intractable abdominal pain (18 Jan 2025 10:29)      INTERVAL HPI/OVERNIGHT EVENTS:    Patient seen and examined.    ROS: All other systems are negative.    Vital Signs:    T(F): 97.6 (01-18-25 @ 05:00), Max: 98.8 (01-17-25 @ 21:59)  HR: 56 (01-18-25 @ 05:00) (56 - 68)  BP: 106/71 (01-18-25 @ 05:00) (106/71 - 143/86)  RR: 18 (01-17-25 @ 14:55) (18 - 18)  SpO2: 97% (01-18-25 @ 05:00) (97% - 98%)  I&O's Summary    Daily     Daily   CAPILLARY BLOOD GLUCOSE          PHYSICAL EXAM:    GENERAL:  NAD  SKIN: No rashes or lesions  HENT: Atraumatic. Normocephalic. PERRL. Moist membranes.  NECK: Supple, No JVD. No lymphadenopathy.  PULMONARY: CTA B/L. No wheezing. No rales  CVS: Normal S1, S2. Rate and Rhythm are regular. No murmurs.  ABDOMEN/GI: Soft, Nontender, Nondistended; BS present  EXTREMITIES: Peripheral pulses intact. No edema B/L LE.  NEUROLOGIC:  No motor or sensory deficit.  PSYCH: Alert & oriented x 3    Consultant(s) Notes Reviewed:  [x ] YES  [ ] NO  Care Discussed with Consultants/Other Providers [ x] YES  [ ] NO    EKG reviewed  Telemetry reviewed    LABS:                        12.6   4.76  )-----------( 188      ( 18 Jan 2025 05:41 )             38.7     01-18    142  |  107  |  10  ----------------------------<  83  3.7   |  27  |  0.8    Ca    8.5      18 Jan 2025 05:41  Mg     1.7     01-18    TPro  5.7[L]  /  Alb  3.5  /  TBili  0.3  /  DBili  x   /  AST  95[H]  /  ALT  59[H]  /  AlkPhos  50  01-18    PT/INR - ( 18 Jan 2025 05:41 )   PT: 29.90 sec;   INR: 2.49 ratio         PTT - ( 17 Jan 2025 04:34 )  PTT:45.0 sec          RADIOLOGY & ADDITIONAL TESTS:      Imaging or report Personally Reviewed:  [ ] YES  [ ] NO    Medications:  Standing  bisacodyl 5 milliGRAM(s) Oral at bedtime  calcium acetate 667 milliGRAM(s) Oral three times a day with meals  folic acid 1 milliGRAM(s) Oral daily  metoclopramide Injectable 10 milliGRAM(s) IV Push every 8 hours  naloxegol 25 milliGRAM(s) Oral daily  pantoprazole    Tablet 40 milliGRAM(s) Oral two times a day  polyethylene glycol 3350 17 Gram(s) Oral two times a day  predniSONE   Tablet 2 milliGRAM(s) Oral daily  senna 2 Tablet(s) Oral at bedtime  sucralfate 1 Gram(s) Oral every 6 hours    PRN Meds  ondansetron Injectable 4 milliGRAM(s) IV Push once PRN  ondansetron Injectable 8 milliGRAM(s) IV Push every 8 hours PRN  oxycodone    5 mG/acetaminophen 325 mG 2 Tablet(s) Oral every 8 hours PRN      Case discussed with resident    Care discussed with pt/family           DAMIAN PRESLEY  44y Female    CHIEF COMPLAINT:    Patient is a 44y old  Female who presents with a chief complaint of Intractable abdominal pain (18 Jan 2025 10:29)      INTERVAL HPI/OVERNIGHT EVENTS:    Patient seen and examined. Pt's gastric emptying study was cancelled. Pt states that she is still having N/V and unable to keep down anything.     ROS: All other systems are negative.    Vital Signs:    T(F): 97.6 (01-18-25 @ 05:00), Max: 98.8 (01-17-25 @ 21:59)  HR: 56 (01-18-25 @ 05:00) (56 - 68)  BP: 106/71 (01-18-25 @ 05:00) (106/71 - 143/86)  RR: 18 (01-17-25 @ 14:55) (18 - 18)  SpO2: 97% (01-18-25 @ 05:00) (97% - 98%)  I&O's Summary    Daily     Daily   CAPILLARY BLOOD GLUCOSE          PHYSICAL EXAM:    GENERAL:  NAD  SKIN: No rashes or lesions  HENT: Atraumatic. Normocephalic. PERRL. Moist membranes.  NECK: Supple, No JVD. No lymphadenopathy.  PULMONARY: CTA B/L. No wheezing. No rales  CVS: Normal S1, S2. Rate and Rhythm are regular. No murmurs.  ABDOMEN/GI: Soft, Nontender, Nondistended; BS present  EXTREMITIES: Peripheral pulses intact. No edema B/L LE.  NEUROLOGIC:  No motor or sensory deficit.  PSYCH: Alert & oriented x 3    Consultant(s) Notes Reviewed:  [x ] YES  [ ] NO  Care Discussed with Consultants/Other Providers [ x] YES  [ ] NO    EKG reviewed  Telemetry reviewed    LABS:                        12.6   4.76  )-----------( 188      ( 18 Jan 2025 05:41 )             38.7     01-18    142  |  107  |  10  ----------------------------<  83  3.7   |  27  |  0.8    Ca    8.5      18 Jan 2025 05:41  Mg     1.7     01-18    TPro  5.7[L]  /  Alb  3.5  /  TBili  0.3  /  DBili  x   /  AST  95[H]  /  ALT  59[H]  /  AlkPhos  50  01-18    PT/INR - ( 18 Jan 2025 05:41 )   PT: 29.90 sec;   INR: 2.49 ratio         PTT - ( 17 Jan 2025 04:34 )  PTT:45.0 sec          RADIOLOGY & ADDITIONAL TESTS:      Imaging or report Personally Reviewed:  [ ] YES  [ ] NO    Medications:  Standing  bisacodyl 5 milliGRAM(s) Oral at bedtime  calcium acetate 667 milliGRAM(s) Oral three times a day with meals  folic acid 1 milliGRAM(s) Oral daily  metoclopramide Injectable 10 milliGRAM(s) IV Push every 8 hours  naloxegol 25 milliGRAM(s) Oral daily  pantoprazole    Tablet 40 milliGRAM(s) Oral two times a day  polyethylene glycol 3350 17 Gram(s) Oral two times a day  predniSONE   Tablet 2 milliGRAM(s) Oral daily  senna 2 Tablet(s) Oral at bedtime  sucralfate 1 Gram(s) Oral every 6 hours    PRN Meds  ondansetron Injectable 4 milliGRAM(s) IV Push once PRN  ondansetron Injectable 8 milliGRAM(s) IV Push every 8 hours PRN  oxycodone    5 mG/acetaminophen 325 mG 2 Tablet(s) Oral every 8 hours PRN      Case discussed with resident    Care discussed with pt/family

## 2025-01-18 NOTE — PROGRESS NOTE ADULT - ASSESSMENT
43 y/o female with a PMHX of PE (on warfarin), Lupus,  RA, gastritis and ectopic pregnancy s/p L salpingectomy  who presents to the hospital for evaluation of a 1 month history of intractable abdominal pain. Patient reports that the pain started in early December and has recently been associated with vomiting NBNB emesis and decreased PO intake.     Abdominal pain / Nausea / Vomiting likely due to Erosive gastritis/duodenitis   H/O chronic PE  SLE / RA  H/O Marijuana use                 PLAN:    ·	KUB noted. Moderate colonic stool burden. Nonobstructive bowel gas pattern.   ·	Cont bowel regimen.   ·	RUQ US noted. Unremarkable.   ·	Cont Miralax, Senna two tablets qhs and Bisacodyl 5 mg po qhs.   ·	Pt's N/V could be multifactorial as per GI f/u. Recommended GES. Was unable to get Gastric emptying study due to N/V. Give Zofran half an hour prior to the test  ·	N/V could be from SLE flare also. C3, C4 levels are normal and double strength DNA is <1. Doubt SLE flare  ·	Marijuana can stay in the system for 30 days. Check urine Cannabis level.   ·	GI f/u noted. Recommended trial of Movantik (Naloxegol) 25 mg once daily  ·	Addiction Medicine eval noted. Unlikely Marijuana withdrawal.   ·	As per Addiction Medicine "chronic cannabis use can paradoxically lead to desensitization of CB1 receptors in the CNS and gut leading to dysregulation of normal GI function, can lead to delayed gastric emptying and lead to exaggerated vomiting reflexes and abdominal discomfort".   ·	EKG on admission: NSR 74/min (interpreted by me)  ·	Troponin: <6--> <6  ·	CTA chest is negative for PE  ·	CT abd/pelvis is unremarkable  ·	S/P EGD on 1/9. Normal esophagus. Erosive gastritis and duodenitis.   ·	GI recommended Protonix 40 mg po q 12h for 8 weeks, then 40 mg po daily  ·	Cont Sucralfate  ·	Cont Reglan 5 mg po half an hour before meals.   ·	Duloxetine and Pregabalin can cause N/V. Hold Duloxetine for now  ·	Cont Prednisone dose 2 mg po daily  ·	INR is 2.07. Give Coumadin as per pharmacy recommendation. Check INR in AM    Progress Note Handoff    Pending (specify):  Consults__Rheumatology eval_______, Tests__Gastric emptying study______, Test Results_______, Other_Nausea/Vomiting________  Family discussion:  Disposition: Home___/SNF___/Other________/Unknown at this time________    Ankit Francois MD  Spectra: [phone] 43 y/o female with a PMHX of PE (on warfarin), Lupus,  RA, gastritis and ectopic pregnancy s/p L salpingectomy  who presents to the hospital for evaluation of a 1 month history of intractable abdominal pain. Patient reports that the pain started in early December and has recently been associated with vomiting NBNB emesis and decreased PO intake.     Abdominal pain / Nausea / Vomiting likely due to Erosive gastritis/duodenitis / Marijuana use and gastroparesis.   H/O chronic PE  SLE / RA  H/O Marijuana use                 PLAN:    ·	KUB noted. Moderate colonic stool burden. Nonobstructive bowel gas pattern.   ·	Cont bowel regimen.   ·	RUQ US noted. Unremarkable.   ·	Cont Miralax, Senna two tablets qhs and Bisacodyl 5 mg po qhs.   ·	Pt's N/V could be multifactorial as per GI f/u. Recommended GES. Was unable to get Gastric emptying study due to N/V. Give Zofran half an hour prior to the test  ·	N/V could be from SLE flare also. C3, C4 levels are normal and double strength DNA is <1. Doubt SLE flare  ·	Marijuana can stay in the system for 30 days. Check urine Cannabis level.   ·	GI f/u noted. Recommended trial of Movantik (Naloxegol) 25 mg once daily  ·	Addiction Medicine eval noted. Unlikely Marijuana withdrawal.   ·	As per Addiction Medicine "chronic cannabis use can paradoxically lead to desensitization of CB1 receptors in the CNS and gut leading to dysregulation of normal GI function, can lead to delayed gastric emptying and lead to exaggerated vomiting reflexes and abdominal discomfort".   ·	EKG on admission: NSR 74/min (interpreted by me)  ·	Troponin: <6--> <6  ·	CTA chest is negative for PE  ·	CT abd/pelvis is unremarkable  ·	S/P EGD on 1/9. Normal esophagus. Erosive gastritis and duodenitis.   ·	GI recommended Protonix 40 mg po q 12h for 8 weeks, then 40 mg po daily  ·	Cont Sucralfate  ·	Cont Reglan 10 mg po half an hour before meals.   ·	Duloxetine and Pregabalin can cause N/V. Hold Duloxetine for now  ·	Cont Prednisone dose 2 mg po daily  ·	INR is 2.49. Give Coumadin as per pharmacy recommendation. Check INR in AM    Progress Note Handoff    Pending (specify):  Consults__Rheumatology eval_______, Tests__Gastric emptying study______, Test Results_______, Other_Nausea/Vomiting________  Family discussion:  Disposition: Home___/SNF___/Other________/Unknown at this time________    Ankit Francois MD  Spectra: [phone]

## 2025-01-18 NOTE — PHARMACOTHERAPY INTERVENTION NOTE - COMMENTS
DAMIAN PRESLEY 44y Female    Indication:VTE  INR Goal: 2-3  Home Dose: 10mg orally once daily  Bridge Therapy:     Current Medications:  bisacodyl 5 milliGRAM(s) Oral at bedtime  calcium acetate 667 milliGRAM(s) Oral three times a day with meals  folic acid 1 milliGRAM(s) Oral daily  metoclopramide Injectable 10 milliGRAM(s) IV Push every 8 hours  naloxegol 25 milliGRAM(s) Oral daily  ondansetron Injectable 4 milliGRAM(s) IV Push once PRN  ondansetron Injectable 8 milliGRAM(s) IV Push every 8 hours PRN  oxycodone    5 mG/acetaminophen 325 mG 2 Tablet(s) Oral every 8 hours PRN  pantoprazole    Tablet 40 milliGRAM(s) Oral two times a day  polyethylene glycol 3350 17 Gram(s) Oral two times a day  predniSONE   Tablet 2 milliGRAM(s) Oral daily  senna 2 Tablet(s) Oral at bedtime  sucralfate 1 Gram(s) Oral every 6 hours      hemoglobin 12.6 g/dL (01-18-25 @ 05:41)    hematocrit 38.7 % (01-18-25 @ 05:41)    PLT: 188 K/uL (01-18-25 @ 05:41)    GFR:92 mL/min/1.73m2 (01-18-25 @ 05:41)      Drug Interactions:     INR trend  2.24 ratio (01-12-25 @ 16:13)  1.97 ratio (01-13-25 @ 05:37)  1.49 ratio (01-14-25 @ 08:30)  2.34 ratio (01-15-25 @ 05:40)  2.83 ratio (01-16-25 @ 06:15)  2.07 ratio (01-17-25 @ 04:34)  2.49 ratio (01-18-25 @ 05:41)      Warfarin administration history:  warfarin: 10 milliGRAM(s) (01-13-25 @ 21:01)  warfarin: 12.5 milliGRAM(s) (01-14-25 @ 22:12)  warfarin: 8 milliGRAM(s) (01-15-25 @ 21:42)  warfarin: 8 milliGRAM(s) (01-16-25 @ 21:42)  warfarin: 10 milliGRAM(s) (01-17-25 @ 21:49)        1. INR today is:               [   ] below goal ----- This is likely due to                                            [ X  ] at goal                                            [   ] above goal ------ This is likely due to        2. Recommend Warfarin     10mg      PO x 1   3. Obtain INR tomorrow AM     DAMIAN PRESLEY 44y Female    Indication:VTE  INR Goal: 2-3  Home Dose: 10mg orally once daily  Bridge Therapy:     Current Medications:  bisacodyl 5 milliGRAM(s) Oral at bedtime  calcium acetate 667 milliGRAM(s) Oral three times a day with meals  folic acid 1 milliGRAM(s) Oral daily  metoclopramide Injectable 10 milliGRAM(s) IV Push every 8 hours  naloxegol 25 milliGRAM(s) Oral daily  ondansetron Injectable 4 milliGRAM(s) IV Push once PRN  ondansetron Injectable 8 milliGRAM(s) IV Push every 8 hours PRN  oxycodone    5 mG/acetaminophen 325 mG 2 Tablet(s) Oral every 8 hours PRN  pantoprazole    Tablet 40 milliGRAM(s) Oral two times a day  polyethylene glycol 3350 17 Gram(s) Oral two times a day  predniSONE   Tablet 2 milliGRAM(s) Oral daily  senna 2 Tablet(s) Oral at bedtime  sucralfate 1 Gram(s) Oral every 6 hours      hemoglobin 12.6 g/dL (01-18-25 @ 05:41)    hematocrit 38.7 % (01-18-25 @ 05:41)    PLT: 188 K/uL (01-18-25 @ 05:41)    GFR:92 mL/min/1.73m2 (01-18-25 @ 05:41)      Drug Interactions:     INR trend  2.24 ratio (01-12-25 @ 16:13)  1.97 ratio (01-13-25 @ 05:37)  1.49 ratio (01-14-25 @ 08:30)  2.34 ratio (01-15-25 @ 05:40)  2.83 ratio (01-16-25 @ 06:15)  2.07 ratio (01-17-25 @ 04:34)  2.49 ratio (01-18-25 @ 05:41)      Warfarin administration history:  warfarin: 10 milliGRAM(s) (01-13-25 @ 21:01)  warfarin: 12.5 milliGRAM(s) (01-14-25 @ 22:12)  warfarin: 8 milliGRAM(s) (01-15-25 @ 21:42)  warfarin: 8 milliGRAM(s) (01-16-25 @ 21:42)  warfarin: 10 milliGRAM(s) (01-17-25 @ 21:49)        1. INR today is:               [   ] below goal ----- This is likely due to                                            [ X  ] at goal                                            [   ] above goal ------ This is likely due to        2. Recommend Warfarin     9mg      PO x 1   3. Obtain INR tomorrow AM

## 2025-01-19 VITALS
DIASTOLIC BLOOD PRESSURE: 88 MMHG | TEMPERATURE: 98 F | HEART RATE: 66 BPM | SYSTOLIC BLOOD PRESSURE: 133 MMHG | RESPIRATION RATE: 15 BRPM | OXYGEN SATURATION: 97 %

## 2025-01-19 LAB
ALBUMIN SERPL ELPH-MCNC: 3.9 G/DL — SIGNIFICANT CHANGE UP (ref 3.5–5.2)
ALP SERPL-CCNC: 51 U/L — SIGNIFICANT CHANGE UP (ref 30–115)
ALT FLD-CCNC: 52 U/L — HIGH (ref 0–41)
ANION GAP SERPL CALC-SCNC: 12 MMOL/L — SIGNIFICANT CHANGE UP (ref 7–14)
AST SERPL-CCNC: 63 U/L — HIGH (ref 0–41)
BASOPHILS # BLD AUTO: 0.06 K/UL — SIGNIFICANT CHANGE UP (ref 0–0.2)
BASOPHILS NFR BLD AUTO: 1.4 % — HIGH (ref 0–1)
BILIRUB SERPL-MCNC: 0.4 MG/DL — SIGNIFICANT CHANGE UP (ref 0.2–1.2)
BUN SERPL-MCNC: 10 MG/DL — SIGNIFICANT CHANGE UP (ref 10–20)
CALCIUM SERPL-MCNC: 8.5 MG/DL — SIGNIFICANT CHANGE UP (ref 8.4–10.5)
CHLORIDE SERPL-SCNC: 105 MMOL/L — SIGNIFICANT CHANGE UP (ref 98–110)
CO2 SERPL-SCNC: 24 MMOL/L — SIGNIFICANT CHANGE UP (ref 17–32)
CREAT SERPL-MCNC: 0.7 MG/DL — SIGNIFICANT CHANGE UP (ref 0.7–1.5)
EGFR: 108 ML/MIN/1.73M2 — SIGNIFICANT CHANGE UP
EOSINOPHIL # BLD AUTO: 0.09 K/UL — SIGNIFICANT CHANGE UP (ref 0–0.7)
EOSINOPHIL NFR BLD AUTO: 2 % — SIGNIFICANT CHANGE UP (ref 0–8)
GLUCOSE SERPL-MCNC: 79 MG/DL — SIGNIFICANT CHANGE UP (ref 70–99)
HCT VFR BLD CALC: 40 % — SIGNIFICANT CHANGE UP (ref 37–47)
HGB BLD-MCNC: 12.9 G/DL — SIGNIFICANT CHANGE UP (ref 12–16)
IMM GRANULOCYTES NFR BLD AUTO: 0 % — LOW (ref 0.1–0.3)
INR BLD: 3.29 RATIO — HIGH (ref 0.65–1.3)
LYMPHOCYTES # BLD AUTO: 2.32 K/UL — SIGNIFICANT CHANGE UP (ref 1.2–3.4)
LYMPHOCYTES # BLD AUTO: 52.5 % — HIGH (ref 20.5–51.1)
MAGNESIUM SERPL-MCNC: 1.8 MG/DL — SIGNIFICANT CHANGE UP (ref 1.8–2.4)
MCHC RBC-ENTMCNC: 31.2 PG — HIGH (ref 27–31)
MCHC RBC-ENTMCNC: 32.3 G/DL — SIGNIFICANT CHANGE UP (ref 32–37)
MCV RBC AUTO: 96.9 FL — SIGNIFICANT CHANGE UP (ref 81–99)
MONOCYTES # BLD AUTO: 0.35 K/UL — SIGNIFICANT CHANGE UP (ref 0.1–0.6)
MONOCYTES NFR BLD AUTO: 7.9 % — SIGNIFICANT CHANGE UP (ref 1.7–9.3)
NEUTROPHILS # BLD AUTO: 1.6 K/UL — SIGNIFICANT CHANGE UP (ref 1.4–6.5)
NEUTROPHILS NFR BLD AUTO: 36.2 % — LOW (ref 42.2–75.2)
NRBC # BLD: 0 /100 WBCS — SIGNIFICANT CHANGE UP (ref 0–0)
NRBC BLD-RTO: 0 /100 WBCS — SIGNIFICANT CHANGE UP (ref 0–0)
PLATELET # BLD AUTO: 195 K/UL — SIGNIFICANT CHANGE UP (ref 130–400)
PMV BLD: 10.4 FL — SIGNIFICANT CHANGE UP (ref 7.4–10.4)
POTASSIUM SERPL-MCNC: 4.1 MMOL/L — SIGNIFICANT CHANGE UP (ref 3.5–5)
POTASSIUM SERPL-SCNC: 4.1 MMOL/L — SIGNIFICANT CHANGE UP (ref 3.5–5)
PROT SERPL-MCNC: 6 G/DL — SIGNIFICANT CHANGE UP (ref 6–8)
PROTHROM AB SERPL-ACNC: 39.8 SEC — HIGH (ref 9.95–12.87)
RBC # BLD: 4.13 M/UL — LOW (ref 4.2–5.4)
RBC # FLD: 11.3 % — LOW (ref 11.5–14.5)
SODIUM SERPL-SCNC: 141 MMOL/L — SIGNIFICANT CHANGE UP (ref 135–146)
WBC # BLD: 4.42 K/UL — LOW (ref 4.8–10.8)
WBC # FLD AUTO: 4.42 K/UL — LOW (ref 4.8–10.8)

## 2025-01-19 PROCEDURE — 99239 HOSP IP/OBS DSCHRG MGMT >30: CPT

## 2025-01-19 RX ORDER — SUCRALFATE 1 G/10ML
1 SUSPENSION ORAL
Qty: 120 | Refills: 1
Start: 2025-01-19 | End: 2025-03-19

## 2025-01-19 RX ORDER — ALPRAZOLAM 2 MG
1 TABLET ORAL
Refills: 0 | DISCHARGE

## 2025-01-19 RX ORDER — PREDNISONE 5 MG/1
2 TABLET ORAL
Qty: 0 | Refills: 0 | DISCHARGE
Start: 2025-01-19

## 2025-01-19 RX ORDER — PANTOPRAZOLE 20 MG/1
1 TABLET, DELAYED RELEASE ORAL
Qty: 0 | Refills: 0 | DISCHARGE
Start: 2025-01-19

## 2025-01-19 RX ORDER — PANTOPRAZOLE 20 MG/1
1 TABLET, DELAYED RELEASE ORAL
Qty: 60 | Refills: 3
Start: 2025-01-19 | End: 2025-05-18

## 2025-01-19 RX ORDER — POLYETHYLENE GLYCOL 3350 17 G/17G
17 POWDER, FOR SOLUTION ORAL
Qty: 1020 | Refills: 0
Start: 2025-01-19 | End: 2025-02-17

## 2025-01-19 RX ORDER — OXYCODONE HYDROCHLORIDE AND ACETAMINOPHEN 5; 325 MG/1; MG/1
1 TABLET ORAL
Refills: 0 | DISCHARGE

## 2025-01-19 RX ORDER — ASPIRIN 81 MG/1
81 TABLET, COATED ORAL
Refills: 0 | DISCHARGE

## 2025-01-19 RX ORDER — DULOXETINE 20 MG/1
1 CAPSULE, DELAYED RELEASE ORAL
Refills: 0 | DISCHARGE

## 2025-01-19 RX ORDER — WARFARIN SODIUM 2 MG/1
1 TABLET ORAL
Qty: 30 | Refills: 3
Start: 2025-01-19 | End: 2025-05-18

## 2025-01-19 RX ORDER — SENNOSIDES 8.6 MG
2 TABLET ORAL
Qty: 60 | Refills: 0
Start: 2025-01-19 | End: 2025-02-17

## 2025-01-19 RX ORDER — METOCLOPRAMIDE 10 MG/1
1 TABLET ORAL
Qty: 90 | Refills: 1
Start: 2025-01-19 | End: 2025-03-19

## 2025-01-19 RX ORDER — ALPRAZOLAM 2 MG
1 TABLET ORAL
Qty: 5 | Refills: 0
Start: 2025-01-19 | End: 2025-01-23

## 2025-01-19 RX ORDER — PREGABALIN CAPSULES, CV 225 MG/1
1 CAPSULE ORAL
Refills: 0 | DISCHARGE

## 2025-01-19 RX ORDER — DOCUSATE SODIUM 100 MG
1 CAPSULE ORAL
Refills: 0 | DISCHARGE

## 2025-01-19 RX ORDER — PREDNISONE 5 MG/1
5 TABLET ORAL
Refills: 0 | DISCHARGE

## 2025-01-19 RX ADMIN — CALCIUM ACETATE 667 MILLIGRAM(S): 667 CAPSULE ORAL at 08:31

## 2025-01-19 RX ADMIN — PREDNISONE 2 MILLIGRAM(S): 5 TABLET ORAL at 05:34

## 2025-01-19 RX ADMIN — Medication 400 MILLIGRAM(S): at 12:22

## 2025-01-19 RX ADMIN — Medication 400 MILLIGRAM(S): at 08:31

## 2025-01-19 RX ADMIN — PANTOPRAZOLE 40 MILLIGRAM(S): 20 TABLET, DELAYED RELEASE ORAL at 05:34

## 2025-01-19 RX ADMIN — CALCIUM ACETATE 667 MILLIGRAM(S): 667 CAPSULE ORAL at 12:22

## 2025-01-19 RX ADMIN — SUCRALFATE 1 GRAM(S): 1 SUSPENSION ORAL at 05:34

## 2025-01-19 RX ADMIN — Medication 1 MILLIGRAM(S): at 12:22

## 2025-01-19 RX ADMIN — SUCRALFATE 1 GRAM(S): 1 SUSPENSION ORAL at 12:22

## 2025-01-19 RX ADMIN — METOCLOPRAMIDE 10 MILLIGRAM(S): 10 TABLET ORAL at 05:34

## 2025-01-19 RX ADMIN — NALOXEGOL OXALATE 25 MILLIGRAM(S): 12.5 TABLET, FILM COATED ORAL at 12:21

## 2025-01-19 NOTE — DISCHARGE NOTE NURSING/CASE MANAGEMENT/SOCIAL WORK - HAS THE PATIENT USED TOBACCO IN THE PAST 30 DAYS?
Dr. Mccloud,  Do you want to to have a hospital follow up with Michelle or Tamara.  Patient has never seen Tamara, but was in the hospital for CHF.   Yes

## 2025-01-19 NOTE — PHARMACOTHERAPY INTERVENTION NOTE - COMMENTS
DAMIAN PRESLEY 44y Female    Indication: VTE  INR Goal: 2-3  Home Dose: 10mg orally once daily  Bridge Therapy: None    Current Medications:  bisacodyl 5 milliGRAM(s) Oral at bedtime  calcium acetate 667 milliGRAM(s) Oral three times a day with meals  folic acid 1 milliGRAM(s) Oral daily  magnesium oxide 400 milliGRAM(s) Oral three times a day with meals  metoclopramide Injectable 10 milliGRAM(s) IV Push every 8 hours  naloxegol 25 milliGRAM(s) Oral daily  ondansetron Injectable 8 milliGRAM(s) IV Push every 8 hours PRN  oxycodone    5 mG/acetaminophen 325 mG 2 Tablet(s) Oral every 8 hours PRN  pantoprazole    Tablet 40 milliGRAM(s) Oral two times a day  polyethylene glycol 3350 17 Gram(s) Oral two times a day  predniSONE   Tablet 2 milliGRAM(s) Oral daily  senna 2 Tablet(s) Oral at bedtime  sucralfate 1 Gram(s) Oral every 6 hours      hemoglobin 12.9 g/dL (01-19-25 @ 06:01)    hematocrit 40.0 % (01-19-25 @ 06:01)    PLT: 195 K/uL (01-19-25 @ 06:01)    GFR:108 mL/min/1.73m2 (01-19-25 @ 06:01)      Drug Interactions:     INR trend  2.24 ratio (01-12-25 @ 16:13)  1.97 ratio (01-13-25 @ 05:37)  1.49 ratio (01-14-25 @ 08:30)  2.34 ratio (01-15-25 @ 05:40)  2.83 ratio (01-16-25 @ 06:15)  2.07 ratio (01-17-25 @ 04:34)  2.49 ratio (01-18-25 @ 05:41)  3.29 ratio (01-19-25 @ 06:01)      Warfarin administration history:  warfarin: 10 milliGRAM(s) (01-13-25 @ 21:01)  warfarin: 12.5 milliGRAM(s) (01-14-25 @ 22:12)  warfarin: 8 milliGRAM(s) (01-15-25 @ 21:42)  warfarin: 8 milliGRAM(s) (01-16-25 @ 21:42)  warfarin: 10 milliGRAM(s) (01-17-25 @ 21:49)  warfarin: 10 milliGRAM(s) (01-18-25 @ 21:24)        1. INR today is:               [   ] below goal ----- This is likely due to                                            [   ] at goal                                            [ X ] above goal ------ This is likely due to the 10mg dose from 1/17 and 1/18 being too high.         2. Recommend Warfarin      5mg     PO x 1   3. Obtain INR tomorrow AM

## 2025-01-19 NOTE — PROGRESS NOTE ADULT - ASSESSMENT
43 y/o female with a PMHX of PE (on warfarin), Lupus,  RA, gastritis and ectopic pregnancy s/p L salpingectomy  who presents to the hospital for evaluation of a 1 month history of intractable abdominal pain. Patient reports that the pain started in early December and has recently been associated with vomiting NBNB emesis and decreased PO intake.     Abdominal pain / Nausea / Vomiting likely due to Erosive gastritis/duodenitis / Marijuana use and gastroparesis.   H/O chronic PE  SLE / RA  H/O Marijuana use                 PLAN:    ·	KUB noted. Moderate colonic stool burden. Nonobstructive bowel gas pattern.   ·	Cont bowel regimen.   ·	RUQ US noted. Unremarkable.   ·	Cont Miralax, Senna two tablets qhs   ·	Pt's N/V could be multifactorial as per GI f/u. Recommended GES. Was unable to get Gastric emptying study due to N/V.   ·	N/V could be from SLE flare also. C3, C4 levels are normal and double strength DNA is <1. Doubt SLE flare  ·	Marijuana can stay in the system for 30 days. Check urine Cannabis level.   ·	Addiction Medicine eval noted. Unlikely Marijuana withdrawal.   ·	As per Addiction Medicine "chronic cannabis use can paradoxically lead to desensitization of CB1 receptors in the CNS and gut leading to dysregulation of normal GI function, can lead to delayed gastric emptying and lead to exaggerated vomiting reflexes and abdominal discomfort".   ·	EKG on admission: NSR 74/min (interpreted by me)  ·	Troponin: <6--> <6  ·	CTA chest is negative for PE  ·	CT abd/pelvis is unremarkable  ·	S/P EGD on 1/9. Normal esophagus. Erosive gastritis and duodenitis.   ·	GI recommended Protonix 40 mg po q 12h for 8 weeks, then 40 mg po daily  ·	Cont Sucralfate  ·	Cont Reglan 10 mg po half an hour before meals.   ·	Duloxetine and Pregabalin can cause N/V. Hold Duloxetine and Pregabalin  ·	Cont Prednisone dose 2 mg po daily  ·	INR is 3.29. Hold Coumadin for two day. Restart on Wednesday and f/u in Coumadin clinic.   ·	D/C home    * Med rec reviewed. Plan of care d/w the pt. Time spent one hour.

## 2025-01-19 NOTE — DISCHARGE NOTE PROVIDER - CARE PROVIDER_API CALL
Tj Aguirre .  Internal Medicine  [address] [place] [address]  Phone: [phone]  Fax: [phone]  Follow Up Time:

## 2025-01-19 NOTE — DISCHARGE NOTE PROVIDER - NSDCCPCAREPLAN_GEN_ALL_CORE_FT
PRINCIPAL DISCHARGE DIAGNOSIS  Diagnosis: Acute erosive gastritis  Assessment and Plan of Treatment: You keep having persistent nausea and vomiting which is multifactorial. Stop taking Marijuana. Stop taking Percocet, Lyrica and Duloxitine. F/U with GI      SECONDARY DISCHARGE DIAGNOSES  Diagnosis: Back pain  Assessment and Plan of Treatment:     Diagnosis: Chest pain  Assessment and Plan of Treatment:     Diagnosis: Systemic lupus erythematosus  Assessment and Plan of Treatment: F/U with you Rheumatologist

## 2025-01-19 NOTE — DISCHARGE NOTE PROVIDER - NSDCFUSCHEDAPPT_GEN_ALL_CORE_FT
Rubén Alonzo  [facility] PreAdmits  Scheduled Appointment: 02/28/2025    Reina Funez  [facility]  GASTRO  [place]   Scheduled Appointment: 02/28/2025

## 2025-01-19 NOTE — DISCHARGE NOTE NURSING/CASE MANAGEMENT/SOCIAL WORK - FINANCIAL ASSISTANCE
[facility] provides services at a reduced cost to those who are determined to be eligible through [facility]’s financial assistance program. Information regarding [facility]’s financial assistance program can be found by going to [email or web address] or by calling [phone].

## 2025-01-19 NOTE — DISCHARGE NOTE PROVIDER - NSDCMRMEDTOKEN_GEN_ALL_CORE_FT
ALPRAZolam 0.5 mg oral tablet: 1 tab(s) orally once a day as needed for  anxiety MDD: 0.5 mg  folic acid: 1 milligram(s) orally once a day  melatonin 3 mg oral tablet: 1 tab(s) orally once a day (at bedtime) as needed for  insomnia  metoclopramide 10 mg oral tablet: 1 tab(s) orally 3 times a day (before meals)  pantoprazole 40 mg oral delayed release tablet: 1 tab(s) orally 2 times a day Take twice a day until 3/8, after that once a day  polyethylene glycol 3350 oral powder for reconstitution: 17 gram(s) orally 2 times a day  predniSONE 1 mg oral tablet: 2 tab(s) orally once a day  senna leaf extract oral tablet: 2 tab(s) orally once a day (at bedtime) MDD: 1  sucralfate 1 g oral tablet: 1 tab(s) orally every 6 hours  warfarin 10 mg oral tablet: 1 tab(s) orally once a day (at bedtime) HOLD FOR TWO DAYS. RESTART ON TUESDAY 1/21

## 2025-01-19 NOTE — DISCHARGE NOTE NURSING/CASE MANAGEMENT/SOCIAL WORK - PATIENT PORTAL LINK FT
You can access the FollowMyHealth Patient Portal offered by [facility] by registering at the following website: [email or web address]. By joining [email or web address] FollowMyHealth portal, you will also be able to view your health information using other applications (apps) compatible with our system.

## 2025-01-19 NOTE — PROGRESS NOTE ADULT - ASSESSMENT
45 y/o female with a PMHX of PE (on warfarin), Lupus,  RA, gastritis and ectopic pregnancy s/p L salpingectomy  who presents to the hospital for evaluation of a 1 month history of intractable abdominal pain. Patient reports that the pain started in early December and has recently been associated with vomiting NBNB emesis and decreased PO intake. She denies any fever, chills, headache, shortness of breath, diarrhea or dysuria .Of note patient has been taking low dose prednisone for the past 7 years after her last lupus flare.     #Suspected gastritis 2/2 to chronic steroid use   #chronic abdominal pain  #chest pain  #Intractable NV  -patient has been taking low dose prednisone for 7 years (currently on 5mg daily)  -epigastric pain>1 month in duration not associated with meals or viral prodrome   -c/w carafate   -c/w home percocet & lyrica  for anxiety  -S/P EGD on 1/9. Normal esophagus. Erosive gastritis and duodenitis. f/u biopsy, outpatient GI f/u  -Continue Protonix 40 mg po q 12h for 8 weeks, then 40 mg po daily  -cont Sucralfate  -Still having N/V. started on compazine  -Diet currently soft and bite sized.   -Pending improvement oral intake for DC  -Repeat serum HCG in 72 hours: 6  -Gi following: Fu RUQ US -WNL. Bowel regimen for constipation. Trial of movantik. Fu gastric emptying study    #Hx of PE  -daily INR   -warfarin dosing as per pharmacy   -INR at goal today  - warfarin 10mg x1 today > f/u INR     #Hx of Lupus  #Hx of RA  -follows with Dr. Sim Sharpe  -no reported recent flairs   -Decreased Prednisone dose to 2 mg po daily    #MISC  DVT prophylaxis: warfarin   GI prophylaxis: Pantoprazole   Diet: Regular   Activity: IAT   Pending: Gastric emptying study, rheum fu

## 2025-01-19 NOTE — PROGRESS NOTE ADULT - PROVIDER SPECIALTY LIST ADULT
Gastroenterology
Hospitalist
Internal Medicine
Gastroenterology
Hospitalist
Internal Medicine
Gastroenterology
Gastroenterology
Hospitalist
Internal Medicine

## 2025-01-19 NOTE — PHARMACOTHERAPY INTERVENTION NOTE - INTERVENTION TYPE RECOOMEND
Pharmacokinetics Evaluation
Dose Optimization/Non-renal Dose Adjustment
Pharmacokinetics Evaluation
Dose Optimization/Non-renal Dose Adjustment

## 2025-01-19 NOTE — PHARMACOTHERAPY INTERVENTION NOTE - INTERVENTION CATEGORIES
Therapy
Therapy
Monitoring
Therapy
Med Reconciliation
Therapy

## 2025-01-19 NOTE — DISCHARGE NOTE PROVIDER - HOSPITAL COURSE
45 y/o female with a PMHX of PE (on warfarin), Lupus,  RA, gastritis and ectopic pregnancy s/p L salpingectomy  who presents to the hospital for evaluation of a 1 month history of intractable abdominal pain. Patient reports that the pain started in early December and has recently been associated with vomiting NBNB emesis and decreased PO intake.   GI was consulted and they EGD on the pt. It showed Erosive gastritis and duodenitis. GI recommended Protonix 40 mg po BID for 8 weeks, then 40 mg po daily  Pt continued to have N/V. Pt also had h/o Marijuana for a long time which was also contributing to the problem. Lab w/u for Lupus flare up was also done which was negative. Most likely pt's N/V is multifactorial. She is being discharged home and is advised to f/u with his PMD, Rheumatologist and GI    ASSESSMENT:     1. Abdominal pain / Nausea / Vomiting likely due to Erosive gastritis/duodenitis / Marijuana use and gastroparesis.   2. H/O chronic PE  3. SLE / RA  4. H/O Marijuana use

## 2025-01-19 NOTE — PROGRESS NOTE ADULT - SUBJECTIVE AND OBJECTIVE BOX
FERNANDEZ DAMIAN  44y Female    CHIEF COMPLAINT:    Patient is a 44y old  Female who presents with a chief complaint of Intractable abdominal pain (19 Jan 2025 00:06)      INTERVAL HPI/OVERNIGHT EVENTS:    Patient seen and examined. Feels better today. Having some improvement in N/V. Wants to go home    ROS: All other systems are negative.    Vital Signs:    T(F): 97.9 (01-19-25 @ 05:00), Max: 98 (01-18-25 @ 11:26)  HR: 65 (01-19-25 @ 05:00) (58 - 71)  BP: 101/65 (01-19-25 @ 05:00) (101/65 - 163/95)  RR: 18 (01-19-25 @ 05:00) (15 - 18)  SpO2: 96% (01-19-25 @ 05:00) (96% - 100%)  I&O's Summary    Daily     Daily   CAPILLARY BLOOD GLUCOSE          PHYSICAL EXAM:    GENERAL:  NAD  SKIN: No rashes or lesions  HENT: Atraumatic. Normocephalic. PERRL. Moist membranes.  NECK: Supple, No JVD. No lymphadenopathy.  PULMONARY: CTA B/L. No wheezing. No rales  CVS: Normal S1, S2. Rate and Rhythm are regular. No murmurs.  ABDOMEN/GI: Soft, Nontender, Nondistended; BS present  EXTREMITIES: Peripheral pulses intact. No edema B/L LE.  NEUROLOGIC:  No motor or sensory deficit.  PSYCH: Alert & oriented x 3    Consultant(s) Notes Reviewed:  [x ] YES  [ ] NO  Care Discussed with Consultants/Other Providers [ x] YES  [ ] NO    EKG reviewed  Telemetry reviewed    LABS:                        12.9   4.42  )-----------( 195      ( 19 Jan 2025 06:01 )             40.0     01-19    141  |  105  |  10  ----------------------------<  79  4.1   |  24  |  0.7    Ca    8.5      19 Jan 2025 06:01  Mg     1.8     01-19    TPro  6.0  /  Alb  3.9  /  TBili  0.4  /  DBili  x   /  AST  63[H]  /  ALT  52[H]  /  AlkPhos  51  01-19    PT/INR - ( 19 Jan 2025 06:01 )   PT: 39.80 sec;   INR: 3.29 ratio                   RADIOLOGY & ADDITIONAL TESTS:      Imaging or report Personally Reviewed:  [ ] YES  [ ] NO    Medications:  Standing  bisacodyl 5 milliGRAM(s) Oral at bedtime  calcium acetate 667 milliGRAM(s) Oral three times a day with meals  folic acid 1 milliGRAM(s) Oral daily  magnesium oxide 400 milliGRAM(s) Oral three times a day with meals  metoclopramide Injectable 10 milliGRAM(s) IV Push every 8 hours  naloxegol 25 milliGRAM(s) Oral daily  pantoprazole    Tablet 40 milliGRAM(s) Oral two times a day  polyethylene glycol 3350 17 Gram(s) Oral two times a day  predniSONE   Tablet 2 milliGRAM(s) Oral daily  senna 2 Tablet(s) Oral at bedtime  sucralfate 1 Gram(s) Oral every 6 hours    PRN Meds  ondansetron Injectable 8 milliGRAM(s) IV Push every 8 hours PRN  oxycodone    5 mG/acetaminophen 325 mG 2 Tablet(s) Oral every 8 hours PRN      Case discussed with resident    Care discussed with pt/family

## 2025-01-19 NOTE — PROGRESS NOTE ADULT - REASON FOR ADMISSION
Intractable abdominal pain

## 2025-01-19 NOTE — PROGRESS NOTE ADULT - SUBJECTIVE AND OBJECTIVE BOX
SUBJECTIVE/OVERNIGHT EVENTS  Today is hospital day 12d. This morning patient was seen and examined at bedside, resting comfortably in bed. No acute or major events overnight.    HPI:  45 y/o female with a PMHX of PE (on warfarin), Lupus,  RA, gastritis and ectopic pregnancy s/p L salpingectomy  who presents to the hospital for evaluation of a 1 month history of intractable abdominal pain. Patient reports that the pain started in early December and has recently been associated with vomiting NBNB emesis and decreased PO intake. She denies any fever, chills, headache, shortness of breath, diarrhea or dysuria     Of note patient has been taking low dose prednisone for the past 7 years after her last lupus flare.     ED Course      Vitals: Temp 98.4, HR 79, /86, RR 20     Imaging: CT PE/ Abd/Pelvis: No evidence of PE or acute intraabdominal pathology     Labs: + for BHCG (5.9)             (07 Jan 2025 10:47)      MEDICATIONS  STANDING MEDICATIONS  bisacodyl 5 milliGRAM(s) Oral at bedtime  calcium acetate 667 milliGRAM(s) Oral three times a day with meals  folic acid 1 milliGRAM(s) Oral daily  magnesium oxide 400 milliGRAM(s) Oral three times a day with meals  metoclopramide Injectable 10 milliGRAM(s) IV Push every 8 hours  naloxegol 25 milliGRAM(s) Oral daily  pantoprazole    Tablet 40 milliGRAM(s) Oral two times a day  polyethylene glycol 3350 17 Gram(s) Oral two times a day  predniSONE   Tablet 2 milliGRAM(s) Oral daily  senna 2 Tablet(s) Oral at bedtime  sucralfate 1 Gram(s) Oral every 6 hours    PRN MEDICATIONS  ondansetron Injectable 8 milliGRAM(s) IV Push every 8 hours PRN  oxycodone    5 mG/acetaminophen 325 mG 2 Tablet(s) Oral every 8 hours PRN    VITALS  T(F): 97.9 (01-18-25 @ 21:18), Max: 98 (01-18-25 @ 11:26)  HR: 58 (01-18-25 @ 21:18) (56 - 71)  BP: 163/95 (01-18-25 @ 21:18) (106/71 - 163/95)  RR: 15 (01-18-25 @ 11:26) (15 - 15)  SpO2: 97% (01-18-25 @ 21:18) (97% - 100%)          PHYSICAL EXAM      GENERAL: No acute distress, well-developed  HEAD:  Atraumatic, Normocephalic  ENT: PERRL, conjunctiva and sclera clear, neck supple, no JVD, moist mucosa, posterior oropharynx clear  CHEST/LUNG: Clear to auscultation bilaterally; No wheeze, equal breath sounds bilaterally, respirations nonlabored  HEART: Regular rate and rhythm; No murmurs, rubs, or gallops  ABDOMEN: Soft, nontender, nondistended; Bowel sounds present, no organomegaly  BACK: no spinal tenderness, no CVA tenderness  EXTREMITIES:  No clubbing, cyanosis, or edema  PSYCH: Nl behavior, nl affect  NEUROLOGY: AAOx3, non-focal, moves all extremities spontaneously  SKIN: Normal color, No rashes or lesions        PAST MEDICAL & SURGICAL HISTORY:  Lupus (systemic lupus erythematosus)      Pulmonary embolism      Fibromyalgia      Gastritis      Rheumatoid arthritis      H/O abdominal surgery            LABS             12.6   4.76  )-----------( 188      ( 01-18-25 @ 05:41 )             38.7     142  |  107  |  10  -------------------------<  83   01-18-25 @ 05:41  3.7  |  27  |  0.8    Ca      8.5     01-18-25 @ 05:41  Mg     1.7     01-18-25 @ 05:41    TPro  5.7  /  Alb  3.5  /  TBili  0.3  /  DBili  x   /  AST  95  /  ALT  59  /  AlkPhos  50  /  GGT  x     01-18-25 @ 05:41    PT/INR - ( 01-18-25 @ 05:41 )   PT: 29.90 sec[H];   INR: 2.49 ratio[H]  PTT - ( 01-17-25 @ 04:34 )  PTT:45.0 sec      Urinalysis Basic - ( 18 Jan 2025 05:41 )    Color: x / Appearance: x / SG: x / pH: x  Gluc: 83 mg/dL / Ketone: x  / Bili: x / Urobili: x   Blood: x / Protein: x / Nitrite: x   Leuk Esterase: x / RBC: x / WBC x   Sq Epi: x / Non Sq Epi: x / Bacteria: x          IMAGING

## 2025-01-21 ENCOUNTER — APPOINTMENT (OUTPATIENT)
Dept: INTERNAL MEDICINE | Facility: CLINIC | Age: 45
End: 2025-01-21

## 2025-01-28 DIAGNOSIS — F12.90 CANNABIS USE, UNSPECIFIED, UNCOMPLICATED: ICD-10-CM

## 2025-01-28 DIAGNOSIS — Z91.018 ALLERGY TO OTHER FOODS: ICD-10-CM

## 2025-01-28 DIAGNOSIS — K29.00 ACUTE GASTRITIS WITHOUT BLEEDING: ICD-10-CM

## 2025-01-28 DIAGNOSIS — K31.84 GASTROPARESIS: ICD-10-CM

## 2025-01-28 DIAGNOSIS — M32.9 SYSTEMIC LUPUS ERYTHEMATOSUS, UNSPECIFIED: ICD-10-CM

## 2025-01-28 DIAGNOSIS — Z91.048 OTHER NONMEDICINAL SUBSTANCE ALLERGY STATUS: ICD-10-CM

## 2025-01-28 DIAGNOSIS — K26.9 DUODENAL ULCER, UNSPECIFIED AS ACUTE OR CHRONIC, WITHOUT HEMORRHAGE OR PERFORATION: ICD-10-CM

## 2025-02-28 ENCOUNTER — APPOINTMENT (OUTPATIENT)
Dept: GASTROENTEROLOGY | Facility: CLINIC | Age: 45
End: 2025-02-28

## 2025-05-24 NOTE — DISCHARGE NOTE ADULT - NS AS DC VTE INSTRUCTION
LAURA/Lynette Coumadin/Warfarin - Compliance.../Coumadin/Warfarin - Dietary Advice.../Coumadin/Warfarin - Follow-up monitoring.../Coumadin/Warfarin - Potential for adverse drug reactions and interactions